# Patient Record
Sex: FEMALE | Race: WHITE | NOT HISPANIC OR LATINO | Employment: UNEMPLOYED | URBAN - NONMETROPOLITAN AREA
[De-identification: names, ages, dates, MRNs, and addresses within clinical notes are randomized per-mention and may not be internally consistent; named-entity substitution may affect disease eponyms.]

---

## 2017-01-03 ENCOUNTER — CONSULT (OUTPATIENT)
Dept: GASTROENTEROLOGY | Facility: CLINIC | Age: 52
End: 2017-01-03

## 2017-01-03 VITALS
BODY MASS INDEX: 26.52 KG/M2 | HEIGHT: 66 IN | OXYGEN SATURATION: 98 % | HEART RATE: 80 BPM | DIASTOLIC BLOOD PRESSURE: 88 MMHG | WEIGHT: 165 LBS | SYSTOLIC BLOOD PRESSURE: 122 MMHG

## 2017-01-03 DIAGNOSIS — Z80.0 FAMILY HISTORY OF COLON CANCER: ICD-10-CM

## 2017-01-03 DIAGNOSIS — R12 HEARTBURN: ICD-10-CM

## 2017-01-03 DIAGNOSIS — R19.7 DIARRHEA, UNSPECIFIED TYPE: ICD-10-CM

## 2017-01-03 DIAGNOSIS — R07.89 CHEST PAIN, ATYPICAL: ICD-10-CM

## 2017-01-03 DIAGNOSIS — R11.0 NAUSEA: ICD-10-CM

## 2017-01-03 DIAGNOSIS — K59.00 CONSTIPATION, UNSPECIFIED CONSTIPATION TYPE: ICD-10-CM

## 2017-01-03 DIAGNOSIS — K44.9 HIATAL HERNIA: ICD-10-CM

## 2017-01-03 DIAGNOSIS — K92.1 MELENA: Primary | ICD-10-CM

## 2017-01-03 DIAGNOSIS — R63.4 WEIGHT LOSS, ABNORMAL: ICD-10-CM

## 2017-01-03 PROCEDURE — 99214 OFFICE O/P EST MOD 30 MIN: CPT | Performed by: PHYSICIAN ASSISTANT

## 2017-01-03 NOTE — MR AVS SNAPSHOT
Yuliana Kennedy   1/3/2017 3:30 PM   Consult    Dept Phone:  [phone]   Encounter #:  [id number]    Provider:  RADHA Pierce   Department:  [facility] GASTROENTEROLOGY                Your Full Care Plan              Today's Medication Changes          These changes are accurate as of: 1/3/17  4:41 PM.  If you have any questions, ask your nurse or doctor.               New Medication(s)Ordered:     polyethylene glycol 236 G solution   Commonly known as:  GoLYTELY   Starting at 6 pm on day prior to procedure, drink 8 ounces every 15 minutes until all gone or stools are clear. May add flavor packet.   Started by:  RADHA Pierce         Stop taking medication(s)listed here:     pantoprazole 40 MG EC tablet   Commonly known as:  PROTONIX   Stopped by:  RADHA Pierce           ranitidine 300 MG capsule   Commonly known as:  ZANTAC   Stopped by:  RADHA Pierce           raNITIdine 300 MG tablet   Commonly known as:  ZANTAC   Stopped by:  RADHA Pierce                Where to Get Your Medications      These medications were sent to [address] [address] [facility] [phone] [place] [id number] FX  [id number] [place] [address]     Phone:  [phone]     polyethylene glycol 236 G solution                  Your Updated Medication List          This list is accurate as of: 1/3/17  4:41 PM.  Always use your most recent med list.                ALPRAZolam 1 MG tablet   Commonly known as:  XANAX   Take 1 tablet by mouth 3 (three) times a day.       Azelastine-Fluticasone 137-50 MCG/ACT suspension       cephalexin 500 MG capsule   Commonly known as:  KEFLEX   Take 1 capsule by mouth 3 (Three) Times a Day.       diclofenac 1 % gel gel   Commonly known as:  VOLTAREN   Apply 4 g topically 4 (four) times a day as needed (pain).       * Loratadine 10 MG capsule   Take 1 capsule by mouth daily.       * loratadine 10 MG tablet   Commonly known as:  CLARITIN       oxyCODONE-acetaminophen  MG per tablet   Commonly known as:  PERCOCET   Take 1 tablet by mouth Every 6 (Six) Hours As Needed for moderate pain (4-6).       polyethylene glycol 236 G solution   Commonly known as:  GoLYTELY   Starting at 6 pm on day prior to procedure, drink 8 ounces every 15 minutes until all gone or stools are clear. May add flavor packet.       sertraline 100 MG tablet   Commonly known as:  ZOLOFT   Take 1 tablet by mouth daily.       * Notice:  This list has 2 medication(s) that are the same as other medications prescribed for you. Read the directions carefully, and ask your doctor or other care provider to review them with you.            We Performed the Following     Case request       You Were Diagnosed With        Codes Comments    Melena    -  Primary ICD-10-CM: K92.1  ICD-9-CM: 578.1     Nausea     ICD-10-CM: R11.0  ICD-9-CM: 787.02     Heartburn     ICD-10-CM: R12  ICD-9-CM: 787.1     Chest pain, atypical     ICD-10-CM: R07.89  ICD-9-CM: 786.59     Constipation, unspecified constipation type     ICD-10-CM: K59.00  ICD-9-CM: 564.00     Diarrhea, unspecified type     ICD-10-CM: R19.7  ICD-9-CM: 787.91     Weight loss, abnormal     ICD-10-CM: R63.4  ICD-9-CM: 783.21     Hiatal hernia     ICD-10-CM: K44.9  ICD-9-CM: 553.3     Family history of colon cancer     ICD-10-CM: Z80.0  ICD-9-CM: V16.0       Instructions     None    Patient Instructions History      Upcoming Appointments     Visit Type Date Time Department    CONSULT 1/3/2017  3:30 PM [facility]    FOLLOW UP 1/11/2017  3:15 PM [facility]    OUTSIDE FACILITY 2/1/2017  9:30 AM [facility] [place]      MyChart Signup     Our records indicate that you have declined [facility] MyChart signup. If you would like to sign up for [employer], please email [email or web address] or call [phone] to obtain an activation code.             Other Info  "from Your Visit           Your Appointments     Jan 11, 2017  3:15 PM EST   Follow Up with Abdoul Crawford MD   [facility] FAMILY MEDICINE (--)    [address]  [facility] [address] [phone]           Arrive 15 minutes prior to appointment.            Feb 01, 2017  9:30 AM EST   Outside Facility with Rush Jordan MD   [facility] GASTROENTEROLOGY (--)    [address]  [facility] [address] [phone]              Allergies     No Known Allergies      Reason for Visit     Heartburn     Weight Loss     Chest Pain     Nausea           Vital Signs     Blood Pressure Pulse Height Weight Oxygen Saturation Body Mass Index    122/88 80 66\" (167.6 cm) 165 lb (74.8 kg) 98% 26.63 kg/m2    Smoking Status                   Current Every Day Smoker           Problems and Diagnoses Noted     Chest pain, atypical    Constipation    Diarrhea    Family history of colon cancer    Heartburn    Hiatal hernia    Black stool due to blood    Nauseous    Weight loss, abnormal        "

## 2017-01-03 NOTE — PROGRESS NOTES
Chief Complaint   Patient presents with   • Heartburn   • Weight Loss   • Chest Pain   • Nausea     Yuliana Kennedy is a 51 y.o. female who presents to the office today at the request of Dr. Abdoul Crawford for Heartburn; Weight Loss; Chest Pain; and Nausea.    HPI    The patient reports that for the last 2 months she has had a 30 pound weight loss without trying, heartburn, chest pain, and nausea.  Patient has also had a steady unintentional weight loss of 30 pounds over the past 4 months.  Her PCP had put her on zantac and Protonix but quit both because she feels like it makes her symptoms worse.  Patient stays nauseated most days.  She has a hiatal hernia.  Her chest pains last for 15-20 minutes.  She went to the ED last year and was told she had non-cardiac chest pain.  The patient explains that she has always struggled with constipation until 2 months ago when she began having diarrhea.  When her heartburn gets severe, she also has vomiting.  Patient denies hematemesis and hematochezia but reports melena less than monthly.  The patient has never had an EGD or colonoscopy.   Medical history includes acute maxillary sinusitis, allergic rhinitis, edema extremities, fracture of left tibia and fibula, generalized anxiety disorder, GERD, hormone replacement therapy, pain in shoulder, ankle and foot, symptomatic, menopausal or female climacteric states, tibial plateau fracture, and tobacco abuse.  Tibia/fibula surgery and cholecystectomy.  Patient admits to smoking 1.5 PPD x 35 years.  She also admits to drinking alcohol 5-6 times per year.  Patient denies use of illicit drugs.                                                       Review of Systems   Constitutional: Positive for fatigue and unexpected weight change.   HENT: Positive for congestion and sore throat.    Eyes: Negative.    Respiratory: Positive for cough.    Cardiovascular: Positive for chest pain, palpitations and leg swelling.   Gastrointestinal:  Positive for abdominal pain, constipation, diarrhea, nausea and vomiting. Negative for abdominal distention, anal bleeding, blood in stool and rectal pain.   Endocrine: Positive for heat intolerance.   Genitourinary: Negative.    Musculoskeletal: Positive for arthralgias, back pain and myalgias.   Skin: Negative.    Allergic/Immunologic: Positive for environmental allergies.   Neurological: Positive for dizziness, light-headedness and headaches.   Hematological: Bruises/bleeds easily.   Psychiatric/Behavioral: Positive for sleep disturbance. The patient is nervous/anxious.        ACTIVE PROBLEMS:   Specialty Problems        Gastroenterology Problems    GERD (gastroesophageal reflux disease)              PAST MEDICAL HISTORY:  Past Medical History   Diagnosis Date   • Acute maxillary sinusitis    • Allergic rhinitis    • Edema extremities    • Fracture of left tibia and fibula    • Generalized anxiety disorder    • GERD (gastroesophageal reflux disease)    • Hormone replacement therapy    • Pain in joint, shoulder region    • Pain, joint, ankle and foot    • Symptomatic menopausal or female climacteric states    • Tibial plateau fracture      Left   • Tobacco abuse        SURGICAL HISTORY:  Past Surgical History   Procedure Laterality Date   • Tibia / fibia lengthening     • Cholecystectomy         FAMILY HISTORY:  Family History   Problem Relation Age of Onset   • Cancer Mother    • Heart disease Mother    • Cancer Father    • Cancer Maternal Grandfather    • Cancer Paternal Grandfather        SOCIAL HISTORY:  Social History   Substance Use Topics   • Smoking status: Current Every Day Smoker     Packs/day: 1.50     Years: 35.00     Types: Cigarettes   • Smokeless tobacco: Never Used   • Alcohol use Yes      Comment: OCCASIONLLY       CURRENT MEDICATION:    Current Outpatient Prescriptions:   •  ALPRAZolam (XANAX) 1 MG tablet, Take 1 tablet by mouth 3 (three) times a day., Disp: 90 tablet, Rfl: 5  •   "Azelastine-Fluticasone 137-50 MCG/ACT suspension, 1-2 sprays into each nostril 2 (two) times a day., Disp: , Rfl:   •  cephalexin (KEFLEX) 500 MG capsule, Take 1 capsule by mouth 3 (Three) Times a Day., Disp: 30 capsule, Rfl: 0  •  diclofenac (VOLTAREN) 1 % gel gel, Apply 4 g topically 4 (four) times a day as needed (pain)., Disp: 3 tube, Rfl: 11  •  loratadine (CLARITIN) 10 MG tablet, , Disp: , Rfl: 11  •  Loratadine 10 MG capsule, Take 1 capsule by mouth daily., Disp: 30 each, Rfl: 11  •  oxyCODONE-acetaminophen (PERCOCET)  MG per tablet, Take 1 tablet by mouth Every 6 (Six) Hours As Needed for moderate pain (4-6)., Disp: 120 tablet, Rfl: 0  •  polyethylene glycol (GoLYTELY) 236 G solution, Starting at 6 pm on day prior to procedure, drink 8 ounces every 15 minutes until all gone or stools are clear. May add flavor packet., Disp: 4000 mL, Rfl: 0  •  sertraline (ZOLOFT) 100 MG tablet, Take 1 tablet by mouth daily., Disp: 30 tablet, Rfl: 11    ALLERGIES:  Review of patient's allergies indicates no known allergies.    VISIT VITALS:  Visit Vitals   • /88   • Pulse 80   • Ht 66\" (167.6 cm)   • Wt 165 lb (74.8 kg)   • SpO2 98%   • BMI 26.63 kg/m2       PHYSICAL EXAMINATION:  Physical Exam   Constitutional: She is oriented to person, place, and time. She appears well-developed and well-nourished. No distress.   HENT:   Head: Normocephalic and atraumatic.   Right Ear: External ear normal.   Left Ear: External ear normal.   Nose: Nose normal.   Mouth/Throat: Oropharynx is clear and moist. No oropharyngeal exudate.   Eyes: Conjunctivae and EOM are normal. Pupils are equal, round, and reactive to light. Right eye exhibits no discharge. Left eye exhibits no discharge. No scleral icterus.   Neck: Normal range of motion. Neck supple. No JVD present. No tracheal deviation present. No thyromegaly present.   Cardiovascular: Normal rate, regular rhythm, normal heart sounds and intact distal pulses.  Exam reveals no " gallop and no friction rub.    No murmur heard.  Pulmonary/Chest: Effort normal and breath sounds normal. No stridor. No respiratory distress. She has no wheezes. She has no rales. She exhibits no tenderness.   Abdominal: Soft. Bowel sounds are normal. She exhibits no distension and no mass. There is no tenderness. There is no rebound and no guarding. No hernia.   Musculoskeletal: She exhibits no edema, tenderness or deformity.   Lymphadenopathy:     She has no cervical adenopathy.   Neurological: She is alert and oriented to person, place, and time. She has normal reflexes. She displays normal reflexes. No cranial nerve deficit. She exhibits normal muscle tone. Coordination normal.   Skin: Skin is warm and dry. No rash noted. She is not diaphoretic. No erythema. No pallor.   Psychiatric: She has a normal mood and affect. Her behavior is normal. Judgment and thought content normal.       Assessment/Plan      Diagnosis Plan   1. Melena  Case request   2. Nausea  Case request   3. Heartburn  Case request   4. Chest pain, atypical  Case request   5. Constipation, unspecified constipation type  Case request   6. Diarrhea, unspecified type  Case request   7. Weight loss, abnormal  Case request   8. Hiatal hernia  Case request   9. Family history of colon cancer  Case request     The patient is having multiple GI symptoms.  She also has a strong family history of colon cancer.  It is recommended that the patient have an EGD/colonoscopy.  Procedures were explained to the patient who voiced understanding and agreement of recommendations.  Return in about 4 weeks (around 1/31/2017) for Recheck.           RADHA Pierce

## 2017-01-11 ENCOUNTER — OFFICE VISIT (OUTPATIENT)
Dept: FAMILY MEDICINE CLINIC | Facility: CLINIC | Age: 52
End: 2017-01-11

## 2017-01-11 VITALS
SYSTOLIC BLOOD PRESSURE: 122 MMHG | DIASTOLIC BLOOD PRESSURE: 80 MMHG | WEIGHT: 163 LBS | OXYGEN SATURATION: 96 % | TEMPERATURE: 97.6 F | BODY MASS INDEX: 26.2 KG/M2 | HEIGHT: 66 IN | HEART RATE: 78 BPM

## 2017-01-11 DIAGNOSIS — J40 BRONCHITIS: ICD-10-CM

## 2017-01-11 DIAGNOSIS — S82.142D TIBIAL PLATEAU FRACTURE, LEFT, CLOSED, WITH ROUTINE HEALING, SUBSEQUENT ENCOUNTER: ICD-10-CM

## 2017-01-11 DIAGNOSIS — G89.29 CHRONIC PAIN OF LEFT KNEE: Primary | ICD-10-CM

## 2017-01-11 DIAGNOSIS — M25.562 CHRONIC PAIN OF LEFT KNEE: Primary | ICD-10-CM

## 2017-01-11 PROCEDURE — 99213 OFFICE O/P EST LOW 20 MIN: CPT | Performed by: FAMILY MEDICINE

## 2017-01-11 RX ORDER — OXYCODONE AND ACETAMINOPHEN 10; 325 MG/1; MG/1
1 TABLET ORAL EVERY 6 HOURS PRN
Qty: 105 TABLET | Refills: 0 | Status: SHIPPED | OUTPATIENT
Start: 2017-01-11 | End: 2017-02-08 | Stop reason: SDUPTHER

## 2017-01-11 NOTE — MR AVS SNAPSHOT
Yuliana Kennedy   1/11/2017 3:15 PM   Office Visit    Dept Phone:  [phone]   Encounter #:  [id number]    Provider:  Abdoul Crawford MD   Department:  [facility] FAMILY MEDICINE                Your Full Care Plan              Today's Medication Changes          These changes are accurate as of: 1/11/17  3:16 PM.  If you have any questions, ask your nurse or doctor.               Medication(s)that have changed:     Loratadine 10 MG capsule   Take 1 capsule by mouth daily.   What changed:  Another medication with the same name was removed. Continue taking this medication, and follow the directions you see here.   Changed by:  Abdoul Crawford MD         Stop taking medication(s)listed here:     cephalexin 500 MG capsule   Commonly known as:  KEFLEX   Stopped by:  Abdoul Crawford MD                Where to Get Your Medications      You can get these medications from any pharmacy     Bring a paper prescription for each of these medications     oxyCODONE-acetaminophen  MG per tablet                  Your Updated Medication List          This list is accurate as of: 1/11/17  3:16 PM.  Always use your most recent med list.                ALPRAZolam 1 MG tablet   Commonly known as:  XANAX   Take 1 tablet by mouth 3 (three) times a day.       Azelastine-Fluticasone 137-50 MCG/ACT suspension       diclofenac 1 % gel gel   Commonly known as:  VOLTAREN   Apply 4 g topically 4 (four) times a day as needed (pain).       Loratadine 10 MG capsule   Take 1 capsule by mouth daily.       oxyCODONE-acetaminophen  MG per tablet   Commonly known as:  PERCOCET   Take 1 tablet by mouth Every 6 (Six) Hours As Needed for moderate pain (4-6).       polyethylene glycol 236 G solution   Commonly known as:  GoLYTELY   Starting at 6 pm on day prior to procedure, drink 8 ounces every 15 minutes until all gone or stools are clear. May add flavor packet.       sertraline 100 MG tablet   Commonly known as:  ZOLOFT   Take 1 tablet by mouth daily.               Instructions     None    Patient Instructions History      Upcoming Appointments     Visit Type Date Time Department    FOLLOW UP 1/11/2017  3:15 PM [facility]    OUTSIDE FACILITY 1/18/2017 11:00 AM [facility]    OUTSIDE FACILITY 2/1/2017  9:30 AM [facility] [place]    OFFICE VISIT 2/9/2017  3:15 PM [facility]    PHYSICAL 3/6/2017  1:30 PM [facility]      MyChart Signup     Our records indicate that you have declined [facility] MyChart signup. If you would like to sign up for MyChart, please email [email or web address] or call [phone] to obtain an activation code.             Other Info from Your Visit           Your Appointments     Jan 18, 2017 11:00 AM EST   Outside Facility with Pb Pritchard III, MD   [facility] GASTROENTEROLOGY (--)    [address]  [place] [address]   [phone]            Feb 01, 2017  9:30 AM EST   Outside Facility with Rush Jordan MD   [facility] GASTROENTEROLOGY (--)    [address]  [facility] [address]   [phone]            Feb 09, 2017  3:15 PM EST   Office Visit with Abdoul Crawford MD   [facility] FAMILY MEDICINE (--)    [address]  [facility] [address] [phone]           Arrive 15 minutes prior to appointment.            Mar 06, 2017  1:30 PM EST   Physical with Abdoul Crawford MD   [facility] FAMILY MEDICINE (--)    [address]  [facility] [address]   [phone]           Arrive 15 minutes prior to appointment.              Allergies     No Known Allergies      Reason for Visit     Knee Pain Pt needs refills on percocet    Earache Dizziness, lightheaded    Wheezing coughing, productive, SOA      Vital Signs     Blood Pressure Pulse  "Temperature Height Weight Oxygen Saturation    122/80 78 97.6 °F (36.4 °C) 66\" (167.6 cm) 163 lb (73.9 kg) 96%    Body Mass Index Smoking Status                26.31 kg/m2 Current Every Day Smoker            "

## 2017-01-15 NOTE — PROGRESS NOTES
Subjective   Yuliana Kennedy is a 51 y.o. female    HPI Comments: Here to follow up persisting left knee pain that started as a traumatic fracture. She now sees Dr Mackey in Orthopedics. She has become dependent on Percocet due to her pain. Her Claudio is reviewed and is appropriate.    Also complains of ear pain and pressure, productive cough, dizziness and SOA. She is a chronic smoker.    Knee Pain      Earache    Associated symptoms include coughing. Pertinent negatives include no sore throat.   Wheezing    Associated symptoms include coughing and ear pain. Pertinent negatives include no chest pain, chills, fever, shortness of breath or sore throat.       The following portions of the patient's history were reviewed and updated as appropriate: allergies, current medications, past social history and problem list    Review of Systems   Constitutional: Negative for chills, fatigue and fever.   HENT: Positive for ear pain. Negative for sinus pressure and sore throat.    Eyes: Negative.    Respiratory: Positive for cough and wheezing. Negative for chest tightness and shortness of breath.    Cardiovascular: Negative for chest pain.   Gastrointestinal: Negative for nausea.   Musculoskeletal: Positive for arthralgias, back pain and gait problem.   Hematological: Negative.    Psychiatric/Behavioral: Negative.        Objective     Vitals:    01/11/17 1454   BP: 122/80   Pulse: 78   Temp: 97.6 °F (36.4 °C)   SpO2: 96%       Physical Exam   Constitutional: She is oriented to person, place, and time. She appears well-developed and well-nourished.   HENT:   Head: Normocephalic and atraumatic.   Right Ear: Tympanic membrane and ear canal normal. Tympanic membrane is not injected.   Left Ear: Tympanic membrane and ear canal normal. Tympanic membrane is not injected.   Nose: Nose normal.   Mouth/Throat: Oropharynx is clear and moist.   Neck: Neck supple. No JVD present.   Cardiovascular: Normal rate, regular rhythm and normal  heart sounds.  Exam reveals no gallop and no friction rub.    No murmur heard.  Pulmonary/Chest: Effort normal. No stridor. No respiratory distress. She has decreased breath sounds. She has wheezes.   Musculoskeletal: She exhibits tenderness and deformity. She exhibits no edema.        Left knee: She exhibits decreased range of motion and deformity. Tenderness found.   Lymphadenopathy:     She has no cervical adenopathy.   Neurological: She is alert and oriented to person, place, and time.   Nursing note and vitals reviewed.      Assessment/Plan   Problem List Items Addressed This Visit        Musculoskeletal and Integument    Tibial plateau fracture      Other Visit Diagnoses     Chronic pain of left knee    -  Primary    Relevant Medications    oxyCODONE-acetaminophen (PERCOCET)  MG per tablet    Bronchitis            Have decreased her oxycodone by 1/2 tablet per day.

## 2017-01-18 ENCOUNTER — ANESTHESIA EVENT (OUTPATIENT)
Dept: PERIOP | Facility: HOSPITAL | Age: 52
End: 2017-01-18

## 2017-01-18 ENCOUNTER — ANESTHESIA (OUTPATIENT)
Dept: PERIOP | Facility: HOSPITAL | Age: 52
End: 2017-01-18

## 2017-01-18 ENCOUNTER — HOSPITAL ENCOUNTER (OUTPATIENT)
Facility: HOSPITAL | Age: 52
Setting detail: HOSPITAL OUTPATIENT SURGERY
Discharge: HOME | End: 2017-01-18
Attending: INTERNAL MEDICINE | Admitting: INTERNAL MEDICINE

## 2017-01-18 VITALS
SYSTOLIC BLOOD PRESSURE: 123 MMHG | WEIGHT: 163 LBS | RESPIRATION RATE: 20 BRPM | HEART RATE: 61 BPM | TEMPERATURE: 97.3 F | DIASTOLIC BLOOD PRESSURE: 79 MMHG | OXYGEN SATURATION: 97 % | BODY MASS INDEX: 26.2 KG/M2 | HEIGHT: 66 IN

## 2017-01-18 DIAGNOSIS — R07.89 CHEST PAIN, ATYPICAL: ICD-10-CM

## 2017-01-18 DIAGNOSIS — R63.4 WEIGHT LOSS, ABNORMAL: ICD-10-CM

## 2017-01-18 DIAGNOSIS — K44.9 HIATAL HERNIA: ICD-10-CM

## 2017-01-18 DIAGNOSIS — Z80.0 FAMILY HISTORY OF COLON CANCER: ICD-10-CM

## 2017-01-18 DIAGNOSIS — K59.00 CONSTIPATION, UNSPECIFIED CONSTIPATION TYPE: ICD-10-CM

## 2017-01-18 DIAGNOSIS — R12 HEARTBURN: ICD-10-CM

## 2017-01-18 DIAGNOSIS — R11.0 NAUSEA: ICD-10-CM

## 2017-01-18 DIAGNOSIS — K92.1 MELENA: ICD-10-CM

## 2017-01-18 DIAGNOSIS — R19.7 DIARRHEA, UNSPECIFIED TYPE: ICD-10-CM

## 2017-01-18 PROCEDURE — 25010000002 PROPOFOL 1000 MG/ML EMULSION: Performed by: NURSE ANESTHETIST, CERTIFIED REGISTERED

## 2017-01-18 PROCEDURE — 45378 DIAGNOSTIC COLONOSCOPY: CPT | Performed by: INTERNAL MEDICINE

## 2017-01-18 PROCEDURE — 43239 EGD BIOPSY SINGLE/MULTIPLE: CPT | Performed by: INTERNAL MEDICINE

## 2017-01-18 PROCEDURE — 25010000002 PROPOFOL 10 MG/ML EMULSION: Performed by: NURSE ANESTHETIST, CERTIFIED REGISTERED

## 2017-01-18 RX ORDER — RANITIDINE 300 MG/1
TABLET ORAL
Refills: 11 | COMMUNITY
Start: 2017-01-11 | End: 2017-05-01

## 2017-01-18 RX ORDER — FENTANYL CITRATE 50 UG/ML
50 INJECTION, SOLUTION INTRAMUSCULAR; INTRAVENOUS
Status: DISCONTINUED | OUTPATIENT
Start: 2017-01-18 | End: 2017-04-05

## 2017-01-18 RX ORDER — OXYCODONE HYDROCHLORIDE AND ACETAMINOPHEN 5; 325 MG/1; MG/1
1 TABLET ORAL ONCE AS NEEDED
Status: ACTIVE | OUTPATIENT
Start: 2017-01-18 | End: 2017-01-28

## 2017-01-18 RX ORDER — SODIUM CHLORIDE, SODIUM LACTATE, POTASSIUM CHLORIDE, CALCIUM CHLORIDE 600; 310; 30; 20 MG/100ML; MG/100ML; MG/100ML; MG/100ML
125 INJECTION, SOLUTION INTRAVENOUS CONTINUOUS
Status: DISCONTINUED | OUTPATIENT
Start: 2017-01-18 | End: 2017-01-18 | Stop reason: HOSPADM

## 2017-01-18 RX ORDER — IPRATROPIUM BROMIDE AND ALBUTEROL SULFATE 2.5; .5 MG/3ML; MG/3ML
3 SOLUTION RESPIRATORY (INHALATION) ONCE AS NEEDED
Status: DISCONTINUED | OUTPATIENT
Start: 2017-01-18 | End: 2017-04-05

## 2017-01-18 RX ORDER — PANTOPRAZOLE SODIUM 40 MG/1
TABLET, DELAYED RELEASE ORAL
Refills: 11 | COMMUNITY
Start: 2017-01-11 | End: 2018-03-29

## 2017-01-18 RX ORDER — PROPOFOL 10 MG/ML
VIAL (ML) INTRAVENOUS AS NEEDED
Status: DISCONTINUED | OUTPATIENT
Start: 2017-01-18 | End: 2017-01-18 | Stop reason: SURG

## 2017-01-18 RX ORDER — MEPERIDINE HYDROCHLORIDE 25 MG/ML
12.5 INJECTION INTRAMUSCULAR; INTRAVENOUS; SUBCUTANEOUS
Status: ACTIVE | OUTPATIENT
Start: 2017-01-18 | End: 2017-01-19

## 2017-01-18 RX ORDER — ONDANSETRON 2 MG/ML
4 INJECTION INTRAMUSCULAR; INTRAVENOUS ONCE AS NEEDED
Status: DISCONTINUED | OUTPATIENT
Start: 2017-01-18 | End: 2017-04-05

## 2017-01-18 RX ORDER — SODIUM CHLORIDE 0.9 % (FLUSH) 0.9 %
1-10 SYRINGE (ML) INJECTION AS NEEDED
Status: DISCONTINUED | OUTPATIENT
Start: 2017-01-18 | End: 2017-01-18 | Stop reason: HOSPADM

## 2017-01-18 RX ADMIN — PROPOFOL 50 MG: 10 INJECTION, EMULSION INTRAVENOUS at 09:09

## 2017-01-18 RX ADMIN — SODIUM CHLORIDE, POTASSIUM CHLORIDE, SODIUM LACTATE AND CALCIUM CHLORIDE: 600; 310; 30; 20 INJECTION, SOLUTION INTRAVENOUS at 09:06

## 2017-01-18 RX ADMIN — PROPOFOL 180 MCG/KG/MIN: 10 INJECTION, EMULSION INTRAVENOUS at 09:09

## 2017-01-18 NOTE — ANESTHESIA PREPROCEDURE EVALUATION
Anesthesia Evaluation     Patient summary reviewed and Nursing notes reviewed    Airway   Mallampati: I  TM distance: <3 FB  Neck ROM: full  no difficulty expected  Dental - normal exam     Pulmonary - negative pulmonary ROS and normal exam   Cardiovascular - negative cardio ROS and normal exam    Neuro/Psych  (+) psychiatric history,    GI/Hepatic/Renal/Endo    (+)  hiatal hernia, GERD,     Musculoskeletal (-) negative ROS    Abdominal  - normal exam    Bowel sounds: normal.   Substance History - negative use     OB/GYN negative ob/gyn ROS         Other                             Anesthesia Plan    ASA 2     general     intravenous induction   Anesthetic plan and risks discussed with patient.  Use of blood products discussed with patient .   Plan discussed with CRNA.

## 2017-01-18 NOTE — OP NOTE
01/18/17    ESOPHAGOGASTRODUODENOSCOPY WITH BIOPSY, COLONOSCOPY  Procedure Note    Yuliana Kennedy  1/18/2017    Pre-op Diagnosis: Recurrent nausea, GERD symptoms, chest pain, weight loss, family history of colon cancer, colon cancer screening, no prior EGD or colonoscopy      Post-op Diagnosis:   -Small hiatal hernia  -Normal colonoscopy        Anesthesia: Per Anesthesia service      Estimated Blood Loss: Negligible      Findings: EGD was performed with careful inspection of the esophagus, stomach and duodenum to the fourth portion.  A very small hiatal hernia was present without obvious active esophagitis.  The examination was otherwise normal.  Biopsies were taken randomly from the gastric antrum and from the duodenum.    Normal rectal examination.  Colonoscopy was performed to the cecum, confirmed by identification of typical cecal anatomy.  Bowel preparation was satisfactory.  The scope was retroflexed within the rectum.  All areas were examined carefully and no abnormality was seen.      Complications: None      Recommendations:   Findings discussed with the patient  Await biopsy findings  Repeat screening/surveillance colonoscopy in about 5 years      Pb Pritchard III, MD     Date: 1/18/2017  Time: 9:31 AM     CC:  Dr. Abdoul Crawford

## 2017-01-18 NOTE — ANESTHESIA POSTPROCEDURE EVALUATION
Patient: Yuliana Kennedy    Procedure Summary     Date Anesthesia Start Anesthesia Stop Room / Location    01/18/17 0906 0931  [facility] OR 10 / [facility] OR       Procedure Diagnosis Surgeon Provider    ESOPHAGOGASTRODUODENOSCOPY WITH BIOPSY  CPTCODE:83495 (N/A Esophagus); COLONOSCOPY  CPTCODE:07766 (N/A ) Heartburn; Melena; Hiatal hernia; Nausea; Family history of colon cancer; Weight loss, abnormal; Chest pain, atypical; Constipation, unspecified constipation type; Diarrhea, unspecified type  (Heartburn [R12]; Melena [K92.1]; Hiatal hernia [K44.9]; Nausea [R11.0]; Family history of colon cancer [Z80.0]; Weight loss, abnormal [R63.4]; Chest pain, atypical [R07.89]; Constipation, unspecified constipation type [K59.00]; Diarrhea, unspecified type [R19.7]) MD Raheel Basilio III, MD          Anesthesia Type: general  Last vitals  /79 (01/18/17 1003)    Temp 97.3 °F (36.3 °C) (01/18/17 0933)    Pulse 61 (01/18/17 1003)   Resp 20 (01/18/17 1003)    SpO2 97 % (01/18/17 1003)      Post Anesthesia Care and Evaluation    Patient location during evaluation: PHASE II  Patient participation: complete - patient participated  Level of consciousness: awake and alert  Pain score: 1  Pain management: adequate  Airway patency: patent  Anesthetic complications: No anesthetic complications  PONV Status: controlled  Cardiovascular status: acceptable  Respiratory status: acceptable  Hydration status: acceptable

## 2017-01-18 NOTE — IP AVS SNAPSHOT
AFTER VISIT SUMMARY             Yuliana Kennedy           About your hospitalization     You were admitted on:  January 18, 2017 You last received care in the:  [facility] OPERATING ROOM DEPARTMENT       Procedures & Surgeries      Procedure(s) (LRB):  ESOPHAGOGASTRODUODENOSCOPY WITH BIOPSY  CPTCODE:21594 (N/A)  COLONOSCOPY  CPTCODE:11667 (N/A)     1/18/2017     Surgeon(s):  Pb Pritchard III, MD  -------------------      Medications    If you or your caregiver advised us that you are currently taking a medication and that medication is marked below as “Resume”, this simply indicates that we have reviewed those medications to make sure our new therapy recommendations do not interfere.  If you have concerns about medications other than those new ones which we are prescribing today, please consult the physician who prescribed them (or your primary physician).  Our review of your home medications is not meant to indicate that we are directing their use.             Your Medications      CONTINUE taking these medications     ALPRAZolam 1 MG tablet   Take 1 tablet by mouth 3 (three) times a day.   Commonly known as:  XANAX           Azelastine-Fluticasone 137-50 MCG/ACT suspension   1-2 sprays into each nostril 2 (two) times a day.           diclofenac 1 % gel gel   Apply 4 g topically 4 (four) times a day as needed (pain).   Commonly known as:  VOLTAREN           Loratadine 10 MG capsule   Take 1 capsule by mouth daily.           oxyCODONE-acetaminophen  MG per tablet   Take 1 tablet by mouth Every 6 (Six) Hours As Needed for moderate pain (4-6).   Commonly known as:  PERCOCET           pantoprazole 40 MG EC tablet   Commonly known as:  PROTONIX           polyethylene glycol 236 G solution   Starting at 6 pm on day prior to procedure, drink 8 ounces every 15 minutes until all gone or stools are clear. May add flavor packet.   Commonly known as:  GoLYTELY           raNITIdine 300 MG tablet      Commonly known as:  ZANTAC           sertraline 100 MG tablet   Take 1 tablet by mouth daily.   Commonly known as:  ZOLOFT                      Your Medications      Your Medication List           Morning Noon Evening Bedtime As Needed    ALPRAZolam 1 MG tablet   Take 1 tablet by mouth 3 (three) times a day.   Commonly known as:  XANAX                                Azelastine-Fluticasone 137-50 MCG/ACT suspension   1-2 sprays into each nostril 2 (two) times a day.                                diclofenac 1 % gel gel   Apply 4 g topically 4 (four) times a day as needed (pain).   Commonly known as:  VOLTAREN                                Loratadine 10 MG capsule   Take 1 capsule by mouth daily.                                oxyCODONE-acetaminophen  MG per tablet   Take 1 tablet by mouth Every 6 (Six) Hours As Needed for moderate pain (4-6).   Commonly known as:  PERCOCET                                pantoprazole 40 MG EC tablet   Commonly known as:  PROTONIX                                polyethylene glycol 236 G solution   Starting at 6 pm on day prior to procedure, drink 8 ounces every 15 minutes until all gone or stools are clear. May add flavor packet.   Commonly known as:  GoLYTELY                                raNITIdine 300 MG tablet   Commonly known as:  ZANTAC                                sertraline 100 MG tablet   Take 1 tablet by mouth daily.   Commonly known as:  ZOLOFT                                         Instructions for After Discharge        Discharge References/Attachments     GENERAL ANESTHESIA, ADULT, CARE AFTER (ENGLISH)    ESOPHAGOGASTRODUODENOSCOPY, CARE AFTER (ENGLISH)    COLONOSCOPY, CARE AFTER (ENGLISH)    HIATAL HERNIA (ENGLISH)       Follow-ups for After Discharge        Follow-up Information     Follow up with Abdoul Crawford MD .    Specialty:  Family Medicine    Contact information:    [facility] [id number] [phone]         Referrals and Follow-ups to Schedule     Discharge Follow-up    As directed    Follow Up Details:  As Needed             Scheduled Appointments     Jan 18, 2017 11:00 AM EST   Outside Facility with Pb Pritchard III, MD   [facility] GASTROENTEROLOGY (--)    [address]  [facility] [address]   [phone]            Feb 01, 2017  9:30 AM EST   Outside Facility with Rush Jordan MD   [facility] GASTROENTEROLOGY (--)    [address]  [facility] [address]   [phone]            Feb 09, 2017  3:15 PM EST   Office Visit with Abdoul Crawford MD   [facility] FAMILY MEDICINE (--)    [address]  [facility] [address]   [phone]           Arrive 15 minutes prior to appointment.            Mar 06, 2017  1:30 PM EST   Physical with Abdoul Crawford MD   [facility] FAMILY [facility]INE (--)    [address]  [facility] [address]   [phone]           Arrive 15 minutes prior to appointment.              MyChart Signup     Our records indicate that you have declined [facility] signup. If you would like to sign up for Kai[employer], please email [email or web address] or call [phone] to obtain an activation code.         Summary of Your Hospitalization        Reason for Hospitalization     Your primary diagnosis was:  Not on File      Care Providers     Provider Service Role Specialty    Pb Pritchard III, MD -- Attending Provider Gastroenterology    Pb Pritchard III, MD -- Surgeon  Gastroenterology      Your Allergies  Date Reviewed: 1/18/2017    No active allergies      Pending Labs     Order Current Status    Tissue Exam Collected (01/18/17 0913)      Patient Belongings Returned     Document Return of Belongings Flowsheet     Were the patient bedside belongings sent home?   --   Belongings Retrieved from Security &  Sent Home   --    Belongings Sent to Safe   --   Medications Retrieved from Pharmacy & Sent Home   --              More Information      General Anesthesia, Adult, Care After  Refer to this sheet in the next few weeks. These instructions provide you with information on caring for yourself after your procedure. Your health care provider may also give you more specific instructions. Your treatment has been planned according to current medical practices, but problems sometimes occur. Call your health care provider if you have any problems or questions after your procedure.  WHAT TO EXPECT AFTER THE PROCEDURE  After the procedure, it is typical to experience:  · Sleepiness.  · Nausea and vomiting.  HOME CARE INSTRUCTIONS  · For the first 24 hours after general anesthesia:  ¨ Have a responsible person with you.  ¨ Do not drive a car. If you are alone, do not take public transportation.  ¨ Do not drink alcohol.  ¨ Do not take medicine that has not been prescribed by your health care provider.  ¨ Do not sign important papers or make important decisions.  ¨ You may resume a normal diet and activities as directed by your health care provider.  · Change bandages (dressings) as directed.  · If you have questions or problems that seem related to general anesthesia, call the hospital and ask for the anesthetist or anesthesiologist on call.  SEEK MEDICAL CARE IF:  · You have nausea and vomiting that continue the day after anesthesia.  · You develop a rash.  SEEK IMMEDIATE MEDICAL CARE IF:   · You have difficulty breathing.  · You have chest pain.  · You have any allergic problems.     This information is not intended to replace advice given to you by your health care provider. Make sure you discuss any questions you have with your health care provider.     Document Released: 03/26/2002 Document Revised: 01/08/2016 Document Reviewed: 04/17/2013  Elsevier Interactive Patient Education ©2016 Elsevier  Inc.          Esophagogastroduodenoscopy, Care After  Refer to this sheet in the next few weeks. These instructions provide you with information about caring for yourself after your procedure. Your health care provider may also give you more specific instructions. Your treatment has been planned according to current medical practices, but problems sometimes occur. Call your health care provider if you have any problems or questions after your procedure.  WHAT TO EXPECT AFTER THE PROCEDURE  After your procedure, it is typical to feel:  · Soreness in your throat.  · Pain with swallowing.  · Sick to your stomach (nauseous).  · Bloated.  · Dizzy.  · Fatigued.  HOME CARE INSTRUCTIONS  · Do not eat or drink anything until the numbing medicine (local anesthetic) has worn off and your gag reflex has returned. You will know that the local anesthetic has worn off when you can swallow comfortably.  · Do not drive or operate machinery until directed by your health care provider.  · Take medicines only as directed by your health care provider.  SEEK MEDICAL CARE IF:   · You cannot stop coughing.  · You are not urinating at all or less than usual.  SEEK IMMEDIATE MEDICAL CARE IF:  · You have difficulty swallowing.  · You cannot eat or drink.  · You have worsening throat or chest pain.  · You have dizziness or lightheadedness or you faint.  · You have nausea or vomiting.  · You have chills.  · You have a fever.  · You have severe abdominal pain.  · You have black, tarry, or bloody stools.     This information is not intended to replace advice given to you by your health care provider. Make sure you discuss any questions you have with your health care provider.     Document Released: 12/04/2013 Document Revised: 01/08/2016 Document Reviewed: 12/04/2013  [employer] Interactive Patient Education ©2016 Elsevier Inc.          Colonoscopy, Care After  Refer to this sheet in the next few weeks. These instructions provide you with  information on caring for yourself after your procedure. Your health care provider may also give you more specific instructions. Your treatment has been planned according to current medical practices, but problems sometimes occur. Call your health care provider if you have any problems or questions after your procedure.  WHAT TO EXPECT AFTER THE PROCEDURE   After your procedure, it is typical to have the following:  · A small amount of blood in your stool.  · Moderate amounts of gas and mild abdominal cramping or bloating.  HOME CARE INSTRUCTIONS  · Do not drive, operate machinery, or sign important documents for 24 hours.  · You may shower and resume your regular physical activities, but move at a slower pace for the first 24 hours.  · Take frequent rest periods for the first 24 hours.  · Walk around or put a warm pack on your abdomen to help reduce abdominal cramping and bloating.  · Drink enough fluids to keep your urine clear or pale yellow.  · You may resume your normal diet as instructed by your health care provider. Avoid heavy or fried foods that are hard to digest.  · Avoid drinking alcohol for 24 hours or as instructed by your health care provider.  · Only take over-the-counter or prescription medicines as directed by your health care provider.  · If a tissue sample (biopsy) was taken during your procedure:    Do not take aspirin or blood thinners for 7 days, or as instructed by your health care provider.    Do not drink alcohol for 7 days, or as instructed by your health care provider.    Eat soft foods for the first 24 hours.  SEEK MEDICAL CARE IF:  You have persistent spotting of blood in your stool 2-3 days after the procedure.  SEEK IMMEDIATE MEDICAL CARE IF:  · You have more than a small spotting of blood in your stool.  · You pass large blood clots in your stool.  · Your abdomen is swollen (distended).  · You have nausea or vomiting.  · You have a fever.  · You have increasing abdominal pain that is  not relieved with medicine.     This information is not intended to replace advice given to you by your health care provider. Make sure you discuss any questions you have with your health care provider.     Document Released: 08/01/2005 Document Revised: 10/08/2014 Document Reviewed: 08/25/2014  [employer] Interactive Patient Education ©2016 [employer]          Hiatal Hernia  A hiatal hernia occurs when part of your stomach slides above the muscle that separates your abdomen from your chest (diaphragm). You can be born with a hiatal hernia (congenital), or it may develop over time. In almost all cases of hiatal hernia, only the top part of the stomach pushes through.   Many people have a hiatal hernia with no symptoms. The larger the hernia, the more likely that you will have symptoms. In some cases, a hiatal hernia allows stomach acid to flow back into the tube that carries food from your mouth to your stomach (esophagus). This may cause heartburn symptoms. Severe heartburn symptoms may mean you have developed a condition called gastroesophageal reflux disease (GERD).   CAUSES   Hiatal hernias are caused by a weakness in the opening (hiatus) where your esophagus passes through your diaphragm to attach to the upper part of your stomach. You may be born with a weakness in your hiatus, or a weakness can develop.  RISK FACTORS  Older age is a major risk factor for a hiatal hernia. Anything that increases pressure on your diaphragm can also increase your risk of a hiatal hernia. This includes:  · Pregnancy.  · Excess weight.  · Frequent constipation.  SIGNS AND SYMPTOMS   People with a hiatal hernia often have no symptoms. If symptoms develop, they are almost always caused by GERD. They may include:  · Heartburn.  · Belching.  · Indigestion.  · Trouble swallowing.  · Coughing or wheezing.   · Sore throat.  · Hoarseness.  · Chest pain.  DIAGNOSIS   A hiatal hernia is sometimes found during an exam for another problem.  Your health care provider may suspect a hiatal hernia if you have symptoms of GERD. Tests may be done to diagnose GERD. These may include:  · X-rays of your stomach or chest.  · An upper gastrointestinal (GI) series. This is an X-ray exam of your GI tract involving the use of a chalky liquid that you swallow. The liquid shows up clearly on the X-ray.  · Endoscopy. This is a procedure to look into your stomach using a thin, flexible tube that has a tiny camera and light on the end of it.  TREATMENT   If you have no symptoms, you may not need treatment. If you have symptoms, treatment may include:  · Dietary and lifestyle changes to help reduce GERD symptoms.  · Medicines. These may include:    Over-the-counter antacids.    Medicines that make your stomach empty more quickly.    Medicines that block the production of stomach acid (H2 blockers).    Stronger medicines to reduce stomach acid (proton pump inhibitors).  · You may need surgery to repair the hernia if other treatments are not helping.  HOME CARE INSTRUCTIONS   · Take all medicines as directed by your health care provider.  · Quit smoking, if you smoke.  · Try to achieve and maintain a healthy body weight.  · Eat frequent small meals instead of three large meals a day. This keeps your stomach from getting too full.    Eat slowly.    Do not lie down right after eating.     Do not eat 1-2 hours before bed.    · Do not drink beverages with caffeine. These include cola, coffee, cocoa, and tea.  · Do not drink alcohol.  · Avoid foods that can make symptoms of GERD worse. These may include:    Fatty foods.    Citrus fruits.    Other foods and drinks that contain acid.  · Avoid putting pressure on your belly. Anything that puts pressure on your belly increases the amount of acid that may be pushed up into your esophagus.      Avoid bending over, especially after eating.    Raise the head of your bed by putting blocks under the legs. This keeps your head and  esophagus higher than your stomach.    Do not wear tight clothing around your chest or stomach.    Try not to strain when having a bowel movement, when urinating, or when lifting heavy objects.  SEEK MEDICAL CARE IF:  · Your symptoms are not controlled with medicines or lifestyle changes.  · You are having trouble swallowing.  · You have coughing or wheezing that will not go away.  SEEK IMMEDIATE MEDICAL CARE IF:  · Your pain is getting worse.  · Your pain spreads to your arms, neck, jaw, teeth, or back.  · You have shortness of breath.  · You sweat for no reason.  · You feel sick to your stomach (nauseous) or vomit.  · You vomit blood.  · You have bright red blood in your stools.  · You have black, tarry stools.       This information is not intended to replace advice given to you by your health care provider. Make sure you discuss any questions you have with your health care provider.     Document Released: 03/09/2005 Document Revised: 01/08/2016 Document Reviewed: 12/05/2014  [employer] Interactive Patient Education ©2016 Elsevier Inc.         PREVENTING SURGICAL SITE INFECTIONS     Surgical Site Infections FAQs  What is a Surgical Site Infection (SSI)?  A surgical site infection is an infection that occurs after surgery in the part of the body where the surgery took place. Most patients who have surgery do not develop an infection. However, infections develop in about 1 to 3 out of every 100 patients who have surgery.  Some of the common symptoms of a surgical site infection are:  · Redness and pain around the area where you had surgery  · Drainage of cloudy fluid from your surgical wound  · Fever  Can SSIs be treated?  Yes. Most surgical site infections can be treated with antibiotics. The antibiotic given to you depends on the bacteria (germs) causing the infection. Sometimes patients with SSIs also need another surgery to treat the infection.  What are some of the things that hospitals are doing to prevent  SSIs?  To prevent SSIs, doctors, nurses, and other healthcare providers:  · Clean their hands and arms up to their elbows with an antiseptic agent just before the surgery.  · Clean their hands with soap and water or an alcohol-based hand rub before and after caring for each patient.  · May remove some of your hair immediately before your surgery using electric clippers if the hair is in the same area where the procedure will occur. They should not shave you with a razor.  · Wear special hair covers, masks, gowns, and gloves during surgery to keep the surgery area clean.  · Give you antibiotics before your surgery starts. In most cases, you should get antibiotics within 60 minutes before the surgery starts and the antibiotics should be stopped within 24 hours after surgery.  · Clean the skin at the site of your surgery with a special soap that kills germs.  What can I do to help prevent SSIs?  Before your surgery:  · Tell your doctor about other medical problems you may have. Health problems such as allergies, diabetes, and obesity could affect your surgery and your treatment.  · Quit smoking. Patients who smoke get more infections. Talk to your doctor about how you can quit before your surgery.  · Do not shave near where you will have surgery. Shaving with a razor can irritate your skin and make it easier to develop an infection.  At the time of your surgery:  · Speak up if someone tries to shave you with a razor before surgery. Ask why you need to be shaved and talk with your surgeon if you have any concerns.  · Ask if you will get antibiotics before surgery.  After your surgery:  · Make sure that your healthcare providers clean their hands before examining you, either with soap and water or an alcohol-based hand rub.    If you do not see your providers clean their hands, please ask them to do so.  · Family and friends who visit you should not touch the surgical wound or dressings.  · Family and friends should  clean their hands with soap and water or an alcohol-based hand rub before and after visiting you. If you do not see them clean their hands, ask them to clean their hands.  What do I need to do when I go home from the hospital?  · Before you go home, your doctor or nurse should explain everything you need to know about taking care of your wound. Make sure you understand how to care for your wound before you leave the hospital.  · Always clean your hands before and after caring for your wound.  · Before you go home, make sure you know who to contact if you have questions or problems after you get home.  · If you have any symptoms of an infection, such as redness and pain at the surgery site, drainage, or fever, call your doctor immediately.  If you have additional questions, please ask your doctor or nurse.  Developed and co-sponsored by The Society for Healthcare Epidemiology of [place] (SHEA); Infectious Diseases Society of [place] (IDSA); American Hospital Association; Association for Professionals in Infection Control and Epidemiology (APIC); Centers for Disease Control and Prevention (CDC); and The Joint Commission.     This information is not intended to replace advice given to you by your health care provider. Make sure you discuss any questions you have with your health care provider.     Document Released: 12/23/2014 Document Revised: 01/08/2016 Document Reviewed: 03/02/2016  [employer] Interactive Patient Education ©2016 [employer] Inc[employer]             SYMPTOMS OF A STROKE    Call 911 or have someone take you to the Emergency Department if you have any of the following:    · Sudden numbness or weakness of your face, arm or leg especially on one side of the body  · Sudden confusion, diffiiculty speaking or trouble understanding   · Changes in your vision or loss of sight in one eye  · Sudden severe headache with no known cause  · sudden dizziness, trouble walking, loss of balance or coordination    It is important to  seek emergency care right away if you have further stroke symptoms. If you get emergency help quickly, the powerful clot-dissolving medicines can reduce the disabilities caused by a stroke.     For more information:    American Stroke Association  9-000-2-STROKE  www.strokeassociation.org           IF YOU SMOKE OR USE TOBACCO PLEASE READ THE FOLLOWING:    Why is smoking bad for me?  Smoking increases the risk of heart disease, lung disease, vascular disease, stroke, and cancer.     If you smoke, STOP!    If you would like more information on quitting smoking, please visit the [employer]â€™s website: www."[employer]/NEURONIX/healthier-together/smoke   This link will provide additional resources including the QUIT line and the Beat the Pack support groups.     For more information:    American Cancer Society  (806) 101-1160    American Heart Association  1-777.387.6105               YOU ARE THE MOST IMPORTANT FACTOR IN YOUR RECOVERY.     Follow all instructions carefully.     I have reviewed my discharge instructions with my nurse, including the following information, if applicable:     Information about my illness and diagnosis   Follow up appointments (including lab draws)   Wound Care   Equipment Needs   Medications (new and continuing) along with side effects   Preventative information such as vaccines and smoking cessations   Diet   Pain   I know when to contact my Doctor's office or seek emergency care      I want my nurse to describe the side effects of my medications: YES NO   If the answer is no, I understand the side effects of my medications: YES NO   My nurse described the side effects of my medications in a way that I could understand: YES NO   I have taken my personal belongings and my own medications with me at discharge: YES NO            I have received this information and my questions have been answered. I have discussed any concerns I see with this plan with the nurse or physician. I  understand these instructions.    Signature of Patient or Responsible Person: _____________________________________    Date: _________________  Time: __________________    Signature of Healthcare Provider: _______________________________________  Date: _________________  Time: __________________

## 2017-01-19 LAB
LAB AP CASE REPORT: NORMAL
Lab: NORMAL
PATH REPORT.FINAL DX SPEC: NORMAL

## 2017-02-01 ENCOUNTER — TELEPHONE (OUTPATIENT)
Dept: FAMILY MEDICINE CLINIC | Facility: CLINIC | Age: 52
End: 2017-02-01

## 2017-02-01 NOTE — TELEPHONE ENCOUNTER
----- Message from Sil Celis sent at 2/1/2017 12:49 PM EST -----  Patient wants to know if Dr.Van Burns can work her in on the Feb 6th or 8th. She has an appt that day with  and drives from [facility]..  Thanks,  Sil..

## 2017-02-08 ENCOUNTER — OFFICE VISIT (OUTPATIENT)
Dept: FAMILY MEDICINE CLINIC | Facility: CLINIC | Age: 52
End: 2017-02-08

## 2017-02-08 VITALS
SYSTOLIC BLOOD PRESSURE: 120 MMHG | OXYGEN SATURATION: 96 % | HEIGHT: 66 IN | WEIGHT: 164 LBS | BODY MASS INDEX: 26.36 KG/M2 | DIASTOLIC BLOOD PRESSURE: 78 MMHG | HEART RATE: 99 BPM | TEMPERATURE: 98.4 F

## 2017-02-08 DIAGNOSIS — F41.9 ANXIETY: ICD-10-CM

## 2017-02-08 DIAGNOSIS — M25.562 CHRONIC PAIN OF LEFT KNEE: Primary | ICD-10-CM

## 2017-02-08 DIAGNOSIS — G89.29 CHRONIC PAIN OF LEFT KNEE: Primary | ICD-10-CM

## 2017-02-08 DIAGNOSIS — K21.00 GASTROESOPHAGEAL REFLUX DISEASE WITH ESOPHAGITIS: ICD-10-CM

## 2017-02-08 DIAGNOSIS — K44.9 HIATAL HERNIA: ICD-10-CM

## 2017-02-08 DIAGNOSIS — S82.142D TIBIAL PLATEAU FRACTURE, LEFT, CLOSED, WITH ROUTINE HEALING, SUBSEQUENT ENCOUNTER: ICD-10-CM

## 2017-02-08 PROCEDURE — 99213 OFFICE O/P EST LOW 20 MIN: CPT | Performed by: FAMILY MEDICINE

## 2017-02-08 RX ORDER — OXYCODONE AND ACETAMINOPHEN 10; 325 MG/1; MG/1
1 TABLET ORAL EVERY 6 HOURS PRN
Qty: 90 TABLET | Refills: 0 | Status: SHIPPED | OUTPATIENT
Start: 2017-02-08 | End: 2017-03-06 | Stop reason: SDUPTHER

## 2017-02-08 RX ORDER — ALPRAZOLAM 1 MG/1
1 TABLET ORAL 3 TIMES DAILY
Qty: 90 TABLET | Refills: 5 | Status: SHIPPED | OUTPATIENT
Start: 2017-02-08 | End: 2017-05-01 | Stop reason: SDUPTHER

## 2017-02-09 NOTE — PROGRESS NOTES
Subjective   Yuliana Kennedy is a 51 y.o. female    HPI Comments: Chronic pain of right knee related to previous severe fracture with opoid dependence. Chronic anxiety stable. Due for medication refills. Claudio is appropriate. Has appointment with Ortho today. No acute complaints.    Knee Pain      Anxiety   Patient reports no confusion, decreased concentration, dizziness, nausea, nervous/anxious behavior, shortness of breath or suicidal ideas.           The following portions of the patient's history were reviewed and updated as appropriate: allergies, current medications, past social history and problem list    Review of Systems   Constitutional: Negative for appetite change and fatigue.   Respiratory: Negative for chest tightness and shortness of breath.    Gastrointestinal: Negative for abdominal pain, diarrhea and nausea.   Musculoskeletal: Positive for arthralgias and gait problem.   Neurological: Negative for dizziness, tremors, weakness, light-headedness and headaches.   Psychiatric/Behavioral: Negative for agitation, behavioral problems, confusion, decreased concentration, dysphoric mood, sleep disturbance and suicidal ideas. The patient is not nervous/anxious.        Objective     Vitals:    02/08/17 1358   BP: 120/78   Pulse: 99   Temp: 98.4 °F (36.9 °C)   SpO2: 96%       Physical Exam   Constitutional: She is oriented to person, place, and time. She appears well-developed and well-nourished.   Cardiovascular: Normal rate and regular rhythm.    Pulmonary/Chest: Effort normal and breath sounds normal.   Musculoskeletal:        Right knee: She exhibits decreased range of motion and deformity. She exhibits no swelling, no effusion and no erythema.   Neurological: She is alert and oriented to person, place, and time.   Psychiatric: Her speech is normal and behavior is normal. Judgment normal. Her mood appears anxious. Cognition and memory are normal.   Nursing note and vitals reviewed.      Assessment/Plan    Problem List Items Addressed This Visit        Respiratory    Hiatal hernia       Digestive    GERD (gastroesophageal reflux disease)       Musculoskeletal and Integument    Tibial plateau fracture    Relevant Medications    oxyCODONE-acetaminophen (PERCOCET)  MG per tablet      Other Visit Diagnoses     Chronic pain of left knee    -  Primary    Relevant Medications    oxyCODONE-acetaminophen (PERCOCET)  MG per tablet    Anxiety        Relevant Medications    ALPRAZolam (XANAX) 1 MG tablet

## 2017-03-06 ENCOUNTER — OFFICE VISIT (OUTPATIENT)
Dept: FAMILY MEDICINE CLINIC | Facility: CLINIC | Age: 52
End: 2017-03-06

## 2017-03-06 VITALS
TEMPERATURE: 98.3 F | OXYGEN SATURATION: 98 % | SYSTOLIC BLOOD PRESSURE: 100 MMHG | DIASTOLIC BLOOD PRESSURE: 68 MMHG | HEIGHT: 66 IN | HEART RATE: 76 BPM | WEIGHT: 168 LBS | BODY MASS INDEX: 27 KG/M2

## 2017-03-06 DIAGNOSIS — Z72.0 TOBACCO USE: ICD-10-CM

## 2017-03-06 DIAGNOSIS — S82.142D TIBIAL PLATEAU FRACTURE, LEFT, CLOSED, WITH ROUTINE HEALING, SUBSEQUENT ENCOUNTER: ICD-10-CM

## 2017-03-06 DIAGNOSIS — Z00.00 ROUTINE GENERAL MEDICAL EXAMINATION AT A HEALTH CARE FACILITY: Primary | ICD-10-CM

## 2017-03-06 DIAGNOSIS — G89.29 CHRONIC PAIN OF LEFT KNEE: ICD-10-CM

## 2017-03-06 DIAGNOSIS — M25.562 CHRONIC PAIN OF LEFT KNEE: ICD-10-CM

## 2017-03-06 PROCEDURE — 99406 BEHAV CHNG SMOKING 3-10 MIN: CPT | Performed by: FAMILY MEDICINE

## 2017-03-06 PROCEDURE — 99396 PREV VISIT EST AGE 40-64: CPT | Performed by: FAMILY MEDICINE

## 2017-03-06 RX ORDER — OXYCODONE AND ACETAMINOPHEN 10; 325 MG/1; MG/1
1 TABLET ORAL EVERY 6 HOURS PRN
Qty: 90 TABLET | Refills: 0 | Status: SHIPPED | OUTPATIENT
Start: 2017-03-06 | End: 2017-04-05 | Stop reason: SDUPTHER

## 2017-03-06 RX ORDER — LORATADINE 10 MG/1
TABLET ORAL
Refills: 11 | COMMUNITY
Start: 2017-02-08 | End: 2017-05-01

## 2017-03-06 NOTE — PROGRESS NOTES
Subjective   Yuliana Kennedy is a 51 y.o. female    HPI Comments: Patient presents for annual health examination as well as a follow-up of her chronic left knee pain.  Her local orthopedist is moving and he has referred her to Dr. Watson at the [facility] for evaluation of her left knee.  Patient has multiple questions about this referral.  I recommended that she meet with him and see if he can answer her questions.  Health maintenance standpoint the patient had a colonoscopy 2 months ago.  She refuses vaccines including flu and pneumonia or shingles because she does not like shots.  She had a tetanus 2 years ago when she fractured her left knee.  She is agreeable to get lab work when she is fasting and we'll do this at [facility] in [place].  She is also due for mammogram but refuses to do this at this time.    Patient is counseled for 3 minutes minimum regarding adverse effects of smoking including lung disease such as COPD and emphysema as well as increased risk for lung cancer. Also stressed was increased risk for heart attack and stroke. Smoking cessation adjuncts including nicotine replacement with patches, gum or lozenges as well as prescription bupropion and Chantix were offered.    Leg Pain          The following portions of the patient's history were reviewed and updated as appropriate: allergies, current medications, past social history and problem list    Review of Systems   Constitutional: Negative.    HENT: Negative.    Eyes: Negative.    Respiratory: Positive for cough and wheezing.    Cardiovascular: Negative.    Gastrointestinal: Negative.    Endocrine: Negative.    Genitourinary: Negative.    Musculoskeletal: Positive for arthralgias and gait problem.   Skin: Negative.    Allergic/Immunologic: Negative.    Hematological: Negative.    Psychiatric/Behavioral: The patient is nervous/anxious.    All other systems reviewed and are negative.      Objective     Vitals:    03/06/17 1334    BP: 100/68   Pulse: 76   Temp: 98.3 °F (36.8 °C)   SpO2: 98%       Physical Exam   Constitutional: She is oriented to person, place, and time. She appears well-developed and well-nourished.   HENT:   Head: Normocephalic and atraumatic.   Right Ear: External ear normal.   Left Ear: External ear normal.   Nose: Nose normal.   Mouth/Throat: Oropharynx is clear and moist.   Eyes: Conjunctivae and EOM are normal. Pupils are equal, round, and reactive to light.   Neck: Normal range of motion. Neck supple. No thyromegaly present.   Cardiovascular: Normal rate, regular rhythm, normal heart sounds and intact distal pulses.    No murmur heard.  Pulmonary/Chest: Effort normal and breath sounds normal.   Abdominal: Soft. Bowel sounds are normal. She exhibits no mass. There is no tenderness.   Musculoskeletal: She exhibits no edema.        Left knee: She exhibits decreased range of motion, deformity and bony tenderness. She exhibits no swelling, no effusion and no ecchymosis. Tenderness found.   Lymphadenopathy:     She has no cervical adenopathy.   Neurological: She is alert and oriented to person, place, and time. She has normal reflexes. No cranial nerve deficit.   Skin: Skin is warm and dry.   Psychiatric: She has a normal mood and affect. Her behavior is normal.   Nursing note and vitals reviewed.      Assessment/Plan   Problem List Items Addressed This Visit        Musculoskeletal and Integument    Tibial plateau fracture    Relevant Medications    oxyCODONE-acetaminophen (PERCOCET)  MG per tablet       Other    Tobacco use      Other Visit Diagnoses     Routine general medical examination at a health care facility    -  Primary    Relevant Orders    CBC (No Diff)    Comprehensive Metabolic Panel    Lipid Panel    TSH    Hepatitis C Antibody    Chronic pain of left knee        Relevant Medications    oxyCODONE-acetaminophen (PERCOCET)  MG per tablet       Patient is counseled today regarding smoking  cessation.  Please see the subjective section of the note for details.    Patient is counseled during today's visit regarding preventative health measures to include use of seatbelts, medication safety, healthy diet and regular exercise.

## 2017-04-05 ENCOUNTER — OFFICE VISIT (OUTPATIENT)
Dept: FAMILY MEDICINE CLINIC | Facility: CLINIC | Age: 52
End: 2017-04-05

## 2017-04-05 VITALS
SYSTOLIC BLOOD PRESSURE: 128 MMHG | BODY MASS INDEX: 27 KG/M2 | HEIGHT: 66 IN | TEMPERATURE: 98.2 F | WEIGHT: 168 LBS | OXYGEN SATURATION: 96 % | DIASTOLIC BLOOD PRESSURE: 78 MMHG | HEART RATE: 71 BPM

## 2017-04-05 DIAGNOSIS — Z72.0 TOBACCO USE: ICD-10-CM

## 2017-04-05 DIAGNOSIS — J01.01 ACUTE RECURRENT MAXILLARY SINUSITIS: ICD-10-CM

## 2017-04-05 DIAGNOSIS — G89.29 CHRONIC PAIN OF LEFT KNEE: Primary | ICD-10-CM

## 2017-04-05 DIAGNOSIS — S82.142D TIBIAL PLATEAU FRACTURE, LEFT, CLOSED, WITH ROUTINE HEALING, SUBSEQUENT ENCOUNTER: ICD-10-CM

## 2017-04-05 DIAGNOSIS — M25.562 CHRONIC PAIN OF LEFT KNEE: Primary | ICD-10-CM

## 2017-04-05 PROCEDURE — 99214 OFFICE O/P EST MOD 30 MIN: CPT | Performed by: FAMILY MEDICINE

## 2017-04-05 RX ORDER — OXYCODONE AND ACETAMINOPHEN 10; 325 MG/1; MG/1
1 TABLET ORAL EVERY 6 HOURS PRN
Qty: 90 TABLET | Refills: 0 | Status: SHIPPED | OUTPATIENT
Start: 2017-04-05 | End: 2017-05-01 | Stop reason: SDUPTHER

## 2017-04-05 RX ORDER — CEPHALEXIN 500 MG/1
500 CAPSULE ORAL 2 TIMES DAILY
Qty: 20 CAPSULE | Refills: 2 | Status: SHIPPED | OUTPATIENT
Start: 2017-04-05 | End: 2017-05-01

## 2017-04-05 NOTE — PROGRESS NOTES
Subjective   Yuliana Kennedy is a 51 y.o. female    HPI Comments: Patient presents for recheck regarding her chronic knee pain due to previous severe fracture.  She has an appointment May 3 at the [facility] with Dr. Watson.  She is very anxious about this.  She is due for refill on her analgesics.  Her Claudio is reviewed and is appropriate.  She is complaining of recurring sinus symptoms with sinus pain and pressure, postnasal sinus drainage, sore throat and ear pressure.  She has not quit smoking.    Knee Pain      Sinusitis   Associated symptoms include congestion, ear pain, headaches and sinus pressure. Pertinent negatives include no chills, coughing, shortness of breath or sore throat.   Headache    Associated symptoms include ear pain, rhinorrhea and sinus pressure. Pertinent negatives include no coughing, dizziness, fever or sore throat.       The following portions of the patient's history were reviewed and updated as appropriate: allergies, current medications, past social history and problem list    Review of Systems   Constitutional: Negative.  Negative for chills, fatigue and fever.   HENT: Positive for congestion, ear pain, postnasal drip, rhinorrhea and sinus pressure. Negative for sore throat.    Eyes: Negative.    Respiratory: Negative for cough, shortness of breath and wheezing.    Cardiovascular: Negative.    Gastrointestinal: Negative.    Endocrine: Negative.    Genitourinary: Negative.    Musculoskeletal: Positive for arthralgias and gait problem.   Skin: Negative.    Allergic/Immunologic: Negative.    Neurological: Positive for headaches. Negative for dizziness.   Hematological: Negative.  Negative for adenopathy.   Psychiatric/Behavioral: The patient is nervous/anxious.    All other systems reviewed and are negative.      Objective     Vitals:    04/05/17 1249   BP: 128/78   Pulse: 71   Temp: 98.2 °F (36.8 °C)   SpO2: 96%       Physical Exam   Constitutional: She is oriented to person,  place, and time. She appears well-developed and well-nourished.   HENT:   Head: Normocephalic and atraumatic.   Right Ear: Tympanic membrane, external ear and ear canal normal.   Left Ear: Tympanic membrane, external ear and ear canal normal.   Nose: Nose normal.   Mouth/Throat: Oropharynx is clear and moist. No oropharyngeal exudate.   Eyes: Conjunctivae and EOM are normal. Pupils are equal, round, and reactive to light.   Neck: Normal range of motion. Neck supple. No thyromegaly present.   Cardiovascular: Normal rate, regular rhythm, normal heart sounds and intact distal pulses.    No murmur heard.  Pulmonary/Chest: Effort normal and breath sounds normal.   Abdominal: Soft. Bowel sounds are normal. She exhibits no mass. There is no tenderness.   Musculoskeletal: She exhibits no edema.   Lymphadenopathy:     She has no cervical adenopathy.   Neurological: She is alert and oriented to person, place, and time. She has normal reflexes. No cranial nerve deficit.   Skin: Skin is warm and dry.   Psychiatric: She has a normal mood and affect. Her behavior is normal.   Nursing note and vitals reviewed.      Assessment/Plan   Problem List Items Addressed This Visit        Musculoskeletal and Integument    Tibial plateau fracture    Relevant Medications    oxyCODONE-acetaminophen (PERCOCET)  MG per tablet       Other    Tobacco use      Other Visit Diagnoses     Chronic pain of left knee    -  Primary    Relevant Medications    oxyCODONE-acetaminophen (PERCOCET)  MG per tablet    Acute recurrent maxillary sinusitis        Relevant Medications    cephalexin (KEFLEX) 500 MG capsule

## 2017-05-01 ENCOUNTER — OFFICE VISIT (OUTPATIENT)
Dept: FAMILY MEDICINE CLINIC | Facility: CLINIC | Age: 52
End: 2017-05-01

## 2017-05-01 VITALS
HEIGHT: 66 IN | HEART RATE: 77 BPM | TEMPERATURE: 98 F | SYSTOLIC BLOOD PRESSURE: 124 MMHG | BODY MASS INDEX: 27 KG/M2 | DIASTOLIC BLOOD PRESSURE: 74 MMHG | OXYGEN SATURATION: 98 % | WEIGHT: 168 LBS

## 2017-05-01 DIAGNOSIS — G89.29 CHRONIC PAIN OF LEFT KNEE: ICD-10-CM

## 2017-05-01 DIAGNOSIS — K21.00 GASTROESOPHAGEAL REFLUX DISEASE WITH ESOPHAGITIS: ICD-10-CM

## 2017-05-01 DIAGNOSIS — M25.562 CHRONIC PAIN OF LEFT KNEE: ICD-10-CM

## 2017-05-01 DIAGNOSIS — F41.9 ANXIETY: ICD-10-CM

## 2017-05-01 DIAGNOSIS — S82.142D TIBIAL PLATEAU FRACTURE, LEFT, CLOSED, WITH ROUTINE HEALING, SUBSEQUENT ENCOUNTER: ICD-10-CM

## 2017-05-01 PROCEDURE — 99214 OFFICE O/P EST MOD 30 MIN: CPT | Performed by: FAMILY MEDICINE

## 2017-05-01 RX ORDER — RANITIDINE 300 MG/1
TABLET ORAL
Qty: 30 TABLET | Refills: 11 | Status: SHIPPED | OUTPATIENT
Start: 2017-05-01 | End: 2018-03-30 | Stop reason: SDUPTHER

## 2017-05-01 RX ORDER — ALPRAZOLAM 1 MG/1
1 TABLET ORAL 3 TIMES DAILY
Qty: 90 TABLET | Refills: 5 | Status: SHIPPED | OUTPATIENT
Start: 2017-05-01 | End: 2017-08-21 | Stop reason: SDUPTHER

## 2017-05-01 RX ORDER — OXYCODONE AND ACETAMINOPHEN 10; 325 MG/1; MG/1
1 TABLET ORAL EVERY 6 HOURS PRN
Qty: 90 TABLET | Refills: 0 | Status: SHIPPED | OUTPATIENT
Start: 2017-05-01 | End: 2017-05-30 | Stop reason: SDUPTHER

## 2017-05-26 ENCOUNTER — TELEPHONE (OUTPATIENT)
Dept: FAMILY MEDICINE CLINIC | Facility: CLINIC | Age: 52
End: 2017-05-26

## 2017-05-26 RX ORDER — LEVOFLOXACIN 500 MG/1
500 TABLET, FILM COATED ORAL DAILY
Qty: 10 TABLET | Refills: 0 | Status: SHIPPED | OUTPATIENT
Start: 2017-05-26 | End: 2017-06-27

## 2017-05-30 ENCOUNTER — OFFICE VISIT (OUTPATIENT)
Dept: FAMILY MEDICINE CLINIC | Facility: CLINIC | Age: 52
End: 2017-05-30

## 2017-05-30 VITALS
WEIGHT: 168 LBS | OXYGEN SATURATION: 99 % | TEMPERATURE: 98 F | DIASTOLIC BLOOD PRESSURE: 68 MMHG | HEART RATE: 72 BPM | HEIGHT: 66 IN | SYSTOLIC BLOOD PRESSURE: 100 MMHG | BODY MASS INDEX: 27 KG/M2

## 2017-05-30 DIAGNOSIS — J40 BRONCHITIS: ICD-10-CM

## 2017-05-30 DIAGNOSIS — G89.29 CHRONIC PAIN OF LEFT KNEE: Primary | ICD-10-CM

## 2017-05-30 DIAGNOSIS — M25.562 CHRONIC PAIN OF LEFT KNEE: Primary | ICD-10-CM

## 2017-05-30 DIAGNOSIS — S82.142D TIBIAL PLATEAU FRACTURE, LEFT, CLOSED, WITH ROUTINE HEALING, SUBSEQUENT ENCOUNTER: ICD-10-CM

## 2017-05-30 PROCEDURE — 99213 OFFICE O/P EST LOW 20 MIN: CPT | Performed by: FAMILY MEDICINE

## 2017-05-30 RX ORDER — OXYCODONE AND ACETAMINOPHEN 10; 325 MG/1; MG/1
1 TABLET ORAL EVERY 6 HOURS PRN
Qty: 90 TABLET | Refills: 0 | Status: SHIPPED | OUTPATIENT
Start: 2017-05-30 | End: 2017-06-27 | Stop reason: SDUPTHER

## 2017-05-30 RX ORDER — PROMETHAZINE HYDROCHLORIDE AND CODEINE PHOSPHATE 6.25; 1 MG/5ML; MG/5ML
5 SYRUP ORAL EVERY 6 HOURS PRN
Qty: 240 ML | Refills: 0 | Status: SHIPPED | OUTPATIENT
Start: 2017-05-30 | End: 2018-02-05

## 2017-05-30 RX ORDER — FEXOFENADINE HCL 180 MG/1
TABLET ORAL
Refills: 5 | COMMUNITY
Start: 2017-05-02 | End: 2017-10-16 | Stop reason: SDUPTHER

## 2017-06-27 ENCOUNTER — OFFICE VISIT (OUTPATIENT)
Dept: FAMILY MEDICINE CLINIC | Facility: CLINIC | Age: 52
End: 2017-06-27

## 2017-06-27 VITALS
DIASTOLIC BLOOD PRESSURE: 84 MMHG | TEMPERATURE: 98.8 F | WEIGHT: 168 LBS | HEART RATE: 78 BPM | HEIGHT: 66 IN | OXYGEN SATURATION: 98 % | SYSTOLIC BLOOD PRESSURE: 102 MMHG | BODY MASS INDEX: 27 KG/M2

## 2017-06-27 DIAGNOSIS — G89.29 CHRONIC PAIN OF LEFT KNEE: Primary | ICD-10-CM

## 2017-06-27 DIAGNOSIS — Z72.0 TOBACCO USE: ICD-10-CM

## 2017-06-27 DIAGNOSIS — S82.142D TIBIAL PLATEAU FRACTURE, LEFT, CLOSED, WITH ROUTINE HEALING, SUBSEQUENT ENCOUNTER: ICD-10-CM

## 2017-06-27 DIAGNOSIS — M25.562 CHRONIC PAIN OF LEFT KNEE: Primary | ICD-10-CM

## 2017-06-27 PROCEDURE — 99213 OFFICE O/P EST LOW 20 MIN: CPT | Performed by: FAMILY MEDICINE

## 2017-06-27 RX ORDER — OXYCODONE AND ACETAMINOPHEN 10; 325 MG/1; MG/1
1 TABLET ORAL EVERY 6 HOURS PRN
Qty: 90 TABLET | Refills: 0 | Status: SHIPPED | OUTPATIENT
Start: 2017-06-27 | End: 2017-07-20 | Stop reason: SDUPTHER

## 2017-06-27 RX ORDER — NICOTINE 21 MG/24HR
1 PATCH, TRANSDERMAL 24 HOURS TRANSDERMAL EVERY 24 HOURS
Qty: 14 PATCH | Refills: 2
Start: 2017-06-27 | End: 2018-08-01

## 2017-06-27 NOTE — PROGRESS NOTES
Subjective   Yuliana Kennedy is a 51 y.o. female    HPI Comments: Patient presents for recheck regarding her persistent left knee pain secondary to previous fractures and surgery.  She is scheduled on July 11 to have her hardware removed with tentative schedule later this year to have a total knee replacement if needed.  She is quite anxious about her surgery.  She has cut way back on her smoking and needs a prescription for nicotine patch.  She had a tooth pulled that was apparently decayed.  She has no acute complaints today.  Her Claudio is reviewed and is appropriate.      The following portions of the patient's history were reviewed and updated as appropriate: allergies, current medications, past social history and problem list    Review of Systems   Constitutional: Negative for chills, fatigue and fever.   HENT: Negative for congestion, ear pain, postnasal drip, rhinorrhea, sinus pressure and sore throat.    Eyes: Negative.    Respiratory: Negative for cough, shortness of breath and wheezing.    Cardiovascular: Negative.    Gastrointestinal: Negative.    Endocrine: Negative.    Genitourinary: Negative.    Musculoskeletal: Positive for arthralgias and gait problem.   Skin: Negative for rash and wound.   Allergic/Immunologic: Negative.    Neurological: Positive for weakness. Negative for dizziness and headaches.   Hematological: Negative.  Negative for adenopathy.   Psychiatric/Behavioral: The patient is nervous/anxious.    All other systems reviewed and are negative.      Objective     Vitals:    06/27/17 1508   BP: 102/84   Pulse: 78   Temp: 98.8 °F (37.1 °C)   SpO2: 98%       Physical Exam   Constitutional: She is oriented to person, place, and time. She appears well-developed and well-nourished.   HENT:   Head: Normocephalic and atraumatic.   Right Ear: Tympanic membrane, external ear and ear canal normal.   Left Ear: Tympanic membrane, external ear and ear canal normal.   Nose: Nose normal.   Mouth/Throat:  Oropharynx is clear and moist. No oropharyngeal exudate.   Eyes: Conjunctivae and EOM are normal. Pupils are equal, round, and reactive to light.   Neck: Normal range of motion. Neck supple. No thyromegaly present.   Cardiovascular: Normal rate, regular rhythm, normal heart sounds and intact distal pulses.    No murmur heard.  Pulmonary/Chest: Effort normal and breath sounds normal.   Abdominal: Soft. Bowel sounds are normal. She exhibits no mass. There is no tenderness.   Musculoskeletal: She exhibits no edema.        Left knee: She exhibits deformity. She exhibits no swelling and no effusion. Tenderness found.   Lymphadenopathy:     She has no cervical adenopathy.   Neurological: She is alert and oriented to person, place, and time. She has normal reflexes. No cranial nerve deficit.   Skin: Skin is warm and dry.   Psychiatric: She has a normal mood and affect. Her behavior is normal.   Nursing note and vitals reviewed.      Assessment/Plan   Problem List Items Addressed This Visit        Musculoskeletal and Integument    Tibial plateau fracture    Relevant Medications    oxyCODONE-acetaminophen (PERCOCET)  MG per tablet       Other    Tobacco use    Relevant Medications    nicotine (NICODERM CQ) 21 MG/24HR patch      Other Visit Diagnoses     Chronic pain of left knee    -  Primary    Relevant Medications    oxyCODONE-acetaminophen (PERCOCET)  MG per tablet

## 2017-07-20 ENCOUNTER — OFFICE VISIT (OUTPATIENT)
Dept: FAMILY MEDICINE CLINIC | Facility: CLINIC | Age: 52
End: 2017-07-20

## 2017-07-20 VITALS
HEART RATE: 60 BPM | SYSTOLIC BLOOD PRESSURE: 110 MMHG | DIASTOLIC BLOOD PRESSURE: 70 MMHG | HEIGHT: 66 IN | OXYGEN SATURATION: 99 % | TEMPERATURE: 98.8 F

## 2017-07-20 DIAGNOSIS — Z98.890 S/P LEFT KNEE SURGERY: ICD-10-CM

## 2017-07-20 DIAGNOSIS — G89.29 CHRONIC PAIN OF LEFT KNEE: Primary | ICD-10-CM

## 2017-07-20 DIAGNOSIS — M25.562 CHRONIC PAIN OF LEFT KNEE: Primary | ICD-10-CM

## 2017-07-20 PROCEDURE — 99213 OFFICE O/P EST LOW 20 MIN: CPT | Performed by: FAMILY MEDICINE

## 2017-07-20 RX ORDER — OXYCODONE AND ACETAMINOPHEN 10; 325 MG/1; MG/1
1 TABLET ORAL EVERY 6 HOURS PRN
Qty: 120 TABLET | Refills: 0 | Status: SHIPPED | OUTPATIENT
Start: 2017-07-20 | End: 2017-08-21 | Stop reason: SDUPTHER

## 2017-07-20 NOTE — PROGRESS NOTES
Subjective   Yuliana Kennedy is a 51 y.o. female    HPI Comments: Patient presents for recheck regarding her left knee pain.  She is approximately one week status post hardware removal from her surgery 2 years ago.  Her sutures are still in place but she has no appointment next week with her surgeon to have those removed.  She is weightbearing and ambulating with a cane.  She is very positive and feels like she is in much less pain than she was after her surgery 2 years ago. apparently they had to take out a lot of scar tissue but were able to get good range of motion after doing so.  She has been compliant with her follow-up visits and postop instructions.  She is due for oxycodone prescription refill.  I'm going to increase her amount because of her postop pain even though they did give her some Percocet at .  This is reflected on her Claudio which is appropriate    Knee Pain          The following portions of the patient's history were reviewed and updated as appropriate: allergies, current medications, past social history and problem list    Review of Systems   Constitutional: Negative for chills, fatigue and fever.   HENT: Negative for congestion, ear pain, postnasal drip, rhinorrhea, sinus pressure and sore throat.    Eyes: Negative.    Respiratory: Negative for cough, shortness of breath and wheezing.    Cardiovascular: Negative.    Gastrointestinal: Negative.    Endocrine: Negative.    Genitourinary: Negative.    Musculoskeletal: Positive for arthralgias and gait problem.   Skin: Negative for rash and wound.   Allergic/Immunologic: Negative.    Neurological: Positive for weakness. Negative for dizziness and headaches.   Hematological: Negative.  Negative for adenopathy.   Psychiatric/Behavioral: The patient is nervous/anxious.    All other systems reviewed and are negative.      Objective     Vitals:    07/20/17 1509   BP: 110/70   Pulse: 60   Temp: 98.8 °F (37.1 °C)   SpO2: 99%       Physical Exam    Constitutional: She is oriented to person, place, and time. She appears well-developed and well-nourished.   HENT:   Head: Normocephalic and atraumatic.   Right Ear: Tympanic membrane, external ear and ear canal normal.   Left Ear: Tympanic membrane, external ear and ear canal normal.   Nose: Nose normal.   Mouth/Throat: Oropharynx is clear and moist. No oropharyngeal exudate.   Eyes: Conjunctivae and EOM are normal. Pupils are equal, round, and reactive to light.   Neck: Normal range of motion. Neck supple. No thyromegaly present.   Cardiovascular: Normal rate, regular rhythm, normal heart sounds and intact distal pulses.    No murmur heard.  Pulmonary/Chest: Effort normal and breath sounds normal.   Abdominal: Soft. Bowel sounds are normal. She exhibits no mass. There is no tenderness.   Musculoskeletal: She exhibits no edema.        Left knee: She exhibits deformity. She exhibits no swelling and no effusion. Tenderness found.   Post surgical knee with appropriate swelling. Sutures intact with minimal erythema, minimal tenderness and no drainage. ROM of knee appropriate without pain.   Lymphadenopathy:     She has no cervical adenopathy.   Neurological: She is alert and oriented to person, place, and time. She has normal reflexes. No cranial nerve deficit.   Skin: Skin is warm and dry.   Psychiatric: She has a normal mood and affect. Her behavior is normal.   Nursing note and vitals reviewed.      Assessment/Plan   Problem List Items Addressed This Visit     None      Visit Diagnoses     Chronic pain of left knee    -  Primary    Relevant Medications    oxyCODONE-acetaminophen (PERCOCET)  MG per tablet    S/P left knee surgery

## 2017-08-21 ENCOUNTER — OFFICE VISIT (OUTPATIENT)
Dept: FAMILY MEDICINE CLINIC | Facility: CLINIC | Age: 52
End: 2017-08-21

## 2017-08-21 VITALS
SYSTOLIC BLOOD PRESSURE: 142 MMHG | TEMPERATURE: 98.9 F | BODY MASS INDEX: 26.84 KG/M2 | DIASTOLIC BLOOD PRESSURE: 80 MMHG | OXYGEN SATURATION: 98 % | HEART RATE: 75 BPM | WEIGHT: 167 LBS | HEIGHT: 66 IN

## 2017-08-21 DIAGNOSIS — G89.29 CHRONIC PAIN OF LEFT KNEE: ICD-10-CM

## 2017-08-21 DIAGNOSIS — M25.562 CHRONIC PAIN OF LEFT KNEE: ICD-10-CM

## 2017-08-21 DIAGNOSIS — F41.9 ANXIETY: ICD-10-CM

## 2017-08-21 PROCEDURE — 99213 OFFICE O/P EST LOW 20 MIN: CPT | Performed by: FAMILY MEDICINE

## 2017-08-21 RX ORDER — OXYCODONE AND ACETAMINOPHEN 10; 325 MG/1; MG/1
1 TABLET ORAL EVERY 6 HOURS PRN
Qty: 120 TABLET | Refills: 0 | Status: SHIPPED | OUTPATIENT
Start: 2017-08-21 | End: 2017-09-19 | Stop reason: SDUPTHER

## 2017-08-21 RX ORDER — ALPRAZOLAM 1 MG/1
1 TABLET ORAL 3 TIMES DAILY
Qty: 90 TABLET | Refills: 5 | Status: SHIPPED | OUTPATIENT
Start: 2017-08-21 | End: 2018-02-09 | Stop reason: SDUPTHER

## 2017-08-21 NOTE — PROGRESS NOTES
Subjective   Yuliana Kennedy is a 51 y.o. female    HPI Comments: Patient is here for recheck regarding chronic pain of the left knee following surgical removal of previous hardware approximately 6 weeks ago.  She is continuing with physical therapy.  She is also to follow-up on chronic anxiety.  She is due for refills on her opioid analgesics as well as her alprazolam.  Her Claudio is reviewed and is appropriate.  She has follow-up with her orthopedic surgeon at  in October.  She is uncertain whether total knee replacement is in the future or not.    Knee Pain      Anxiety   Symptoms include nervous/anxious behavior. Patient reports no dizziness or shortness of breath.           The following portions of the patient's history were reviewed and updated as appropriate: allergies, current medications, past social history and problem list    Review of Systems   Constitutional: Negative for chills, fatigue and fever.   HENT: Negative for congestion, ear pain, postnasal drip, rhinorrhea, sinus pressure and sore throat.    Eyes: Negative.    Respiratory: Negative for cough, shortness of breath and wheezing.    Cardiovascular: Negative.    Gastrointestinal: Negative.    Endocrine: Negative.    Genitourinary: Negative.    Musculoskeletal: Positive for arthralgias and gait problem.   Skin: Negative for rash and wound.   Allergic/Immunologic: Negative.    Neurological: Positive for weakness. Negative for dizziness and headaches.   Hematological: Negative.  Negative for adenopathy.   Psychiatric/Behavioral: The patient is nervous/anxious.    All other systems reviewed and are negative.      Objective     Vitals:    08/21/17 1440   BP: 142/80   Pulse: 75   Temp: 98.9 °F (37.2 °C)   SpO2: 98%       Physical Exam   Constitutional: She is oriented to person, place, and time. She appears well-developed and well-nourished.   HENT:   Head: Normocephalic and atraumatic.   Right Ear: Tympanic membrane, external ear and ear canal  normal.   Left Ear: Tympanic membrane, external ear and ear canal normal.   Nose: Nose normal.   Mouth/Throat: Oropharynx is clear and moist. No oropharyngeal exudate.   Eyes: Conjunctivae and EOM are normal. Pupils are equal, round, and reactive to light.   Neck: Normal range of motion. Neck supple. No thyromegaly present.   Cardiovascular: Normal rate, regular rhythm, normal heart sounds and intact distal pulses.    No murmur heard.  Pulmonary/Chest: Effort normal and breath sounds normal.   Abdominal: Soft. Bowel sounds are normal. She exhibits no mass. There is no tenderness.   Musculoskeletal: She exhibits no edema.        Left knee: She exhibits deformity. She exhibits no swelling and no effusion. Tenderness found.   Lymphadenopathy:     She has no cervical adenopathy.   Neurological: She is alert and oriented to person, place, and time. She has normal reflexes. No cranial nerve deficit.   Skin: Skin is warm and dry.   Psychiatric: She has a normal mood and affect. Her behavior is normal.   Nursing note and vitals reviewed.      Assessment/Plan   Problem List Items Addressed This Visit     None      Visit Diagnoses     Chronic pain of left knee        Relevant Medications    oxyCODONE-acetaminophen (PERCOCET)  MG per tablet    Anxiety        Relevant Medications    ALPRAZolam (XANAX) 1 MG tablet

## 2017-08-22 RX ORDER — SERTRALINE HYDROCHLORIDE 100 MG/1
TABLET, FILM COATED ORAL
Qty: 30 TABLET | Refills: 11 | Status: SHIPPED | OUTPATIENT
Start: 2017-08-22 | End: 2017-11-13

## 2017-09-19 ENCOUNTER — OFFICE VISIT (OUTPATIENT)
Dept: FAMILY MEDICINE CLINIC | Facility: CLINIC | Age: 52
End: 2017-09-19

## 2017-09-19 VITALS
SYSTOLIC BLOOD PRESSURE: 128 MMHG | TEMPERATURE: 98 F | HEIGHT: 66 IN | DIASTOLIC BLOOD PRESSURE: 68 MMHG | OXYGEN SATURATION: 98 % | HEART RATE: 88 BPM

## 2017-09-19 DIAGNOSIS — M25.562 CHRONIC PAIN OF LEFT KNEE: Primary | ICD-10-CM

## 2017-09-19 DIAGNOSIS — R11.2 NON-INTRACTABLE VOMITING WITH NAUSEA, UNSPECIFIED VOMITING TYPE: ICD-10-CM

## 2017-09-19 DIAGNOSIS — H66.93 BILATERAL OTITIS MEDIA, UNSPECIFIED CHRONICITY, UNSPECIFIED OTITIS MEDIA TYPE: ICD-10-CM

## 2017-09-19 DIAGNOSIS — G89.29 CHRONIC PAIN OF LEFT KNEE: Primary | ICD-10-CM

## 2017-09-19 PROCEDURE — 99214 OFFICE O/P EST MOD 30 MIN: CPT | Performed by: FAMILY MEDICINE

## 2017-09-19 RX ORDER — OXYCODONE AND ACETAMINOPHEN 10; 325 MG/1; MG/1
1 TABLET ORAL EVERY 6 HOURS PRN
Qty: 120 TABLET | Refills: 0 | Status: SHIPPED | OUTPATIENT
Start: 2017-09-19 | End: 2017-10-16 | Stop reason: SDUPTHER

## 2017-09-19 RX ORDER — PANTOPRAZOLE SODIUM 40 MG/1
TABLET, DELAYED RELEASE ORAL
Qty: 30 TABLET | Refills: 11 | Status: SHIPPED | OUTPATIENT
Start: 2017-09-19 | End: 2018-09-12

## 2017-09-19 RX ORDER — CEPHALEXIN 500 MG/1
500 CAPSULE ORAL 3 TIMES DAILY
Qty: 30 CAPSULE | Refills: 0 | Status: SHIPPED | OUTPATIENT
Start: 2017-09-19 | End: 2017-11-13

## 2017-09-19 NOTE — PROGRESS NOTES
Subjective   Yuliana Kennedy is a 51 y.o. female    HPI Comments: Patient is here for follow-up regarding her chronic left knee pain and refill of her oxycodone prescription.  Her Claudio is reviewed and is appropriate.  She continues doing physical therapy for her left knee and has a follow-up with her orthopedic surgeon at the [facility] in October.  She also complains today of ear pain, dizziness, nausea and vomiting.  This started today.  She denies any fever or chills, sinus pain or pressure or sore throat.    Knee Pain      Vomiting    Associated symptoms include arthralgias, chills and dizziness. Pertinent negatives include no coughing, fever or headaches.   Chills   Associated symptoms include arthralgias, chills, congestion, nausea, vomiting and weakness. Pertinent negatives include no coughing, fatigue, fever, headaches, rash or sore throat.   Dizziness   Associated symptoms include arthralgias, chills, congestion, nausea, vomiting and weakness. Pertinent negatives include no coughing, fatigue, fever, headaches, rash or sore throat.       The following portions of the patient's history were reviewed and updated as appropriate: allergies, current medications, past social history and problem list    Review of Systems   Constitutional: Positive for chills. Negative for fatigue and fever.   HENT: Positive for congestion, ear pain and sinus pressure. Negative for postnasal drip, rhinorrhea and sore throat.    Eyes: Negative.    Respiratory: Negative for cough, shortness of breath and wheezing.    Cardiovascular: Negative.    Gastrointestinal: Positive for nausea and vomiting.   Endocrine: Negative.    Genitourinary: Negative.    Musculoskeletal: Positive for arthralgias and gait problem.   Skin: Negative for rash and wound.   Allergic/Immunologic: Negative.    Neurological: Positive for dizziness and weakness. Negative for headaches.   Hematological: Negative.  Negative for adenopathy.    Psychiatric/Behavioral: The patient is nervous/anxious.    All other systems reviewed and are negative.      Objective     Vitals:    09/19/17 1454   BP: 128/68   Pulse: 88   Temp: 98 °F (36.7 °C)   SpO2: 98%       Physical Exam   Constitutional: She is oriented to person, place, and time. She appears well-developed and well-nourished.   HENT:   Head: Normocephalic and atraumatic.   Right Ear: Ear canal normal. Tympanic membrane is injected.   Left Ear: Ear canal normal. Tympanic membrane is injected.   Nose: Nose normal.   Mouth/Throat: Posterior oropharyngeal erythema present. No oropharyngeal exudate or posterior oropharyngeal edema.   Eyes: Conjunctivae and EOM are normal. Pupils are equal, round, and reactive to light.   Neck: Normal range of motion. Neck supple. No thyromegaly present.   Cardiovascular: Normal rate, regular rhythm, normal heart sounds and intact distal pulses.    No murmur heard.  Pulmonary/Chest: Effort normal and breath sounds normal.   Abdominal: Soft. Bowel sounds are normal. She exhibits no mass. There is no tenderness.   Musculoskeletal: She exhibits no edema.        Left knee: She exhibits deformity. She exhibits no swelling and no effusion. Tenderness found.   Lymphadenopathy:     She has no cervical adenopathy.   Neurological: She is alert and oriented to person, place, and time. She has normal reflexes. No cranial nerve deficit.   Skin: Skin is warm and dry.   Psychiatric: She has a normal mood and affect. Her behavior is normal.   Nursing note and vitals reviewed.      Assessment/Plan   Problem List Items Addressed This Visit     None      Visit Diagnoses     Chronic pain of left knee    -  Primary    Relevant Medications    oxyCODONE-acetaminophen (PERCOCET)  MG per tablet    Bilateral otitis media, unspecified chronicity, unspecified otitis media type        Relevant Medications    cephalexin (KEFLEX) 500 MG capsule    Non-intractable vomiting with nausea, unspecified  vomiting type

## 2017-10-16 ENCOUNTER — OFFICE VISIT (OUTPATIENT)
Dept: FAMILY MEDICINE CLINIC | Facility: CLINIC | Age: 52
End: 2017-10-16

## 2017-10-16 VITALS
OXYGEN SATURATION: 98 % | WEIGHT: 166 LBS | DIASTOLIC BLOOD PRESSURE: 68 MMHG | RESPIRATION RATE: 16 BRPM | HEIGHT: 66 IN | SYSTOLIC BLOOD PRESSURE: 120 MMHG | HEART RATE: 89 BPM | BODY MASS INDEX: 26.68 KG/M2

## 2017-10-16 DIAGNOSIS — G89.29 CHRONIC PAIN OF LEFT KNEE: Primary | ICD-10-CM

## 2017-10-16 DIAGNOSIS — M25.562 CHRONIC PAIN OF LEFT KNEE: Primary | ICD-10-CM

## 2017-10-16 DIAGNOSIS — Z98.890 S/P LEFT KNEE SURGERY: ICD-10-CM

## 2017-10-16 PROCEDURE — 99213 OFFICE O/P EST LOW 20 MIN: CPT | Performed by: FAMILY MEDICINE

## 2017-10-16 RX ORDER — OXYCODONE AND ACETAMINOPHEN 10; 325 MG/1; MG/1
1 TABLET ORAL EVERY 6 HOURS PRN
Qty: 120 TABLET | Refills: 0 | Status: SHIPPED | OUTPATIENT
Start: 2017-10-16 | End: 2017-11-13 | Stop reason: SDUPTHER

## 2017-10-16 NOTE — PROGRESS NOTES
Subjective   Yuliana Kennedy is a 52 y.o. female    HPI Comments: Patient presents today for recheck regarding her chronic left knee pain associated with previous tibial plateau fracture as well as subsequent surgery for hardware removal.  She is now seeing Dr. Watson at the [facility].  At her most recent visit this month he did x-rays which revealed significant posttraumatic arthritis.  She has improved since the hardware was removed.  Her gait and range of motion are significantly improved.  He recommended that she give the knee at least one year before consideration for total knee replacement unless her pain becomes much more severe.      The following portions of the patient's history were reviewed and updated as appropriate: allergies, current medications, past social history and problem list    Review of Systems   Constitutional: Negative for chills, fatigue and fever.   HENT: Negative for congestion, ear pain, postnasal drip, rhinorrhea, sinus pressure and sore throat.    Eyes: Negative.    Respiratory: Negative for cough, shortness of breath and wheezing.    Cardiovascular: Negative.    Gastrointestinal: Negative.    Endocrine: Negative.    Genitourinary: Negative.    Musculoskeletal: Positive for arthralgias and gait problem.   Skin: Negative for rash and wound.   Allergic/Immunologic: Negative.    Neurological: Positive for weakness. Negative for dizziness and headaches.   Hematological: Negative.  Negative for adenopathy.   Psychiatric/Behavioral: The patient is nervous/anxious.    All other systems reviewed and are negative.      Objective     Vitals:    10/16/17 1434   BP: 120/68   Pulse: 89   Resp: 16   SpO2: 98%       Physical Exam   Constitutional: She is oriented to person, place, and time. She appears well-developed and well-nourished.   HENT:   Right Ear: Tympanic membrane and ear canal normal.   Left Ear: Tympanic membrane and ear canal normal.   Mouth/Throat: No oropharyngeal exudate.    Neck: Normal range of motion. Neck supple. No thyromegaly present.   Cardiovascular: Normal rate and regular rhythm.    No murmur heard.  Pulmonary/Chest: Effort normal. She has decreased breath sounds.   Abdominal: Soft. Bowel sounds are normal. She exhibits no mass. There is no tenderness.   Musculoskeletal: She exhibits no edema.        Left knee: She exhibits deformity. She exhibits no swelling and no effusion. Tenderness found.   Lymphadenopathy:     She has no cervical adenopathy.   Neurological: She is alert and oriented to person, place, and time. She has normal reflexes. No cranial nerve deficit.   Skin: Skin is warm and dry.   Psychiatric: She has a normal mood and affect. Her behavior is normal.   Nursing note and vitals reviewed.      Assessment/Plan   Problem List Items Addressed This Visit     None      Visit Diagnoses     Chronic pain of left knee    -  Primary    Relevant Medications    oxyCODONE-acetaminophen (PERCOCET)  MG per tablet    S/P left knee surgery

## 2017-10-17 RX ORDER — FEXOFENADINE HYDROCHLORIDE 180 MG/1
TABLET, FILM COATED ORAL
Qty: 30 TABLET | Refills: 5 | Status: SHIPPED | OUTPATIENT
Start: 2017-10-17 | End: 2018-03-30 | Stop reason: SDUPTHER

## 2017-11-13 ENCOUNTER — OFFICE VISIT (OUTPATIENT)
Dept: FAMILY MEDICINE CLINIC | Facility: CLINIC | Age: 52
End: 2017-11-13

## 2017-11-13 VITALS
BODY MASS INDEX: 27.03 KG/M2 | HEIGHT: 66 IN | SYSTOLIC BLOOD PRESSURE: 118 MMHG | HEART RATE: 84 BPM | DIASTOLIC BLOOD PRESSURE: 80 MMHG | OXYGEN SATURATION: 100 % | WEIGHT: 168.2 LBS

## 2017-11-13 DIAGNOSIS — J40 BRONCHITIS: ICD-10-CM

## 2017-11-13 DIAGNOSIS — Z98.890 S/P LEFT KNEE SURGERY: ICD-10-CM

## 2017-11-13 DIAGNOSIS — G89.29 CHRONIC PAIN OF LEFT KNEE: Primary | ICD-10-CM

## 2017-11-13 DIAGNOSIS — S82.142D TIBIAL PLATEAU FRACTURE, LEFT, CLOSED, WITH ROUTINE HEALING, SUBSEQUENT ENCOUNTER: ICD-10-CM

## 2017-11-13 DIAGNOSIS — J01.01 ACUTE RECURRENT MAXILLARY SINUSITIS: ICD-10-CM

## 2017-11-13 DIAGNOSIS — F32.A DEPRESSION, UNSPECIFIED DEPRESSION TYPE: ICD-10-CM

## 2017-11-13 DIAGNOSIS — Z72.0 TOBACCO USE: ICD-10-CM

## 2017-11-13 DIAGNOSIS — M25.562 CHRONIC PAIN OF LEFT KNEE: Primary | ICD-10-CM

## 2017-11-13 PROCEDURE — 99214 OFFICE O/P EST MOD 30 MIN: CPT | Performed by: FAMILY MEDICINE

## 2017-11-13 RX ORDER — AMOXICILLIN AND CLAVULANATE POTASSIUM 875; 125 MG/1; MG/1
1 TABLET, FILM COATED ORAL 2 TIMES DAILY
Qty: 20 TABLET | Refills: 0 | Status: SHIPPED | OUTPATIENT
Start: 2017-11-13 | End: 2017-11-23

## 2017-11-13 RX ORDER — DULOXETIN HYDROCHLORIDE 30 MG/1
30 CAPSULE, DELAYED RELEASE ORAL DAILY
Qty: 30 CAPSULE | Refills: 5 | Status: SHIPPED | OUTPATIENT
Start: 2017-11-13 | End: 2017-12-08

## 2017-11-13 RX ORDER — OXYCODONE AND ACETAMINOPHEN 10; 325 MG/1; MG/1
1 TABLET ORAL EVERY 6 HOURS PRN
Qty: 120 TABLET | Refills: 0 | Status: SHIPPED | OUTPATIENT
Start: 2017-11-13 | End: 2017-12-08 | Stop reason: SDUPTHER

## 2017-11-13 NOTE — PROGRESS NOTES
Subjective   Yuliana Kennedy is a 52 y.o. female    HPI Comments: Patient returns for recheck regarding chronic left knee pain.  Since last seen she injured her left ankle and had to stop doing physical therapy for her left knee.  X-rays of her ankle were negative and she was told that she had sprained her ankle.  She requests a new physical therapy referral order so she can continue therapy for her left knee pain.  Her ambulation is improved but she is unable to ambulate very far and cannot stand for very long period of time due to the discomfort in her knee.  Her initial injury was a tibial plateau fracture for which she had surgery. She never recovered very well after the initial injury.  Subsequent referral to the [facility] led to another surgery including removal of the hardware in her left knee.  She is told that she may come to total knee replacement by her [facility] orthopedic surgeon.    Also complains today of productive cough, sinus congestion, postnasal drainage, sore throat and, ear pain.  She continues to smoke cigarettes.      Chronic anxiety.  Stable on current medication.  Claudio is reviewed and is appropriate regarding her controlled substances.      Depression.  Patient doesn't feel that her current antidepressant, sertraline, is helping and would like to try something different.      Depression Patient presents with the following symptoms: nervousness/anxiety.  Patient is not experiencing: confusion, decreased concentration, shortness of breath and suicidal ideas.    Hypertension   Pertinent negatives include no chest pain, headaches or shortness of breath.       The following portions of the patient's history were reviewed and updated as appropriate: allergies, current medications, past social history and problem list    Review of Systems   Constitutional: Negative for appetite change, chills, fatigue and fever.   HENT: Negative.  Negative for congestion, ear pain, postnasal  drip, rhinorrhea, sinus pressure and sore throat.    Eyes: Negative.    Respiratory: Positive for chest tightness. Negative for cough, shortness of breath and wheezing.    Cardiovascular: Negative.  Negative for chest pain.   Gastrointestinal: Negative.  Negative for abdominal pain, diarrhea and nausea.   Endocrine: Negative.    Genitourinary: Negative.    Musculoskeletal: Positive for arthralgias and gait problem.   Skin: Negative for rash and wound.   Allergic/Immunologic: Negative.    Neurological: Positive for weakness. Negative for dizziness, tremors, light-headedness and headaches.   Hematological: Negative.  Negative for adenopathy.   Psychiatric/Behavioral: Positive for dysphoric mood and sleep disturbance. Negative for agitation, behavioral problems, confusion, decreased concentration and suicidal ideas. The patient is nervous/anxious.    All other systems reviewed and are negative.      Objective     Vitals:    11/13/17 1458   BP: 118/80   Pulse: 84   SpO2: 100%       Physical Exam   Constitutional: She is oriented to person, place, and time. She appears well-developed and well-nourished. No distress.   HENT:   Head: Normocephalic and atraumatic.   Right Ear: Tympanic membrane and ear canal normal.   Left Ear: Tympanic membrane and ear canal normal.   Nose: Mucosal edema, rhinorrhea and sinus tenderness present. Right sinus exhibits maxillary sinus tenderness and frontal sinus tenderness. Left sinus exhibits maxillary sinus tenderness and frontal sinus tenderness.   Mouth/Throat: Oropharynx is clear and moist. No oropharyngeal exudate.   Eyes: Pupils are equal, round, and reactive to light.   Neck: Normal range of motion. Neck supple. No JVD present. No thyromegaly present.   Cardiovascular: Normal rate, regular rhythm and normal heart sounds.    No murmur heard.  Pulmonary/Chest: Effort normal. No stridor. No respiratory distress. She has decreased breath sounds. She has wheezes.   Abdominal: Soft. Bowel  sounds are normal. She exhibits no mass. There is no tenderness.   Musculoskeletal: She exhibits no edema.        Left knee: She exhibits deformity. She exhibits no swelling and no effusion. Tenderness found.   Lymphadenopathy:     She has no cervical adenopathy.   Neurological: She is alert and oriented to person, place, and time. She has normal reflexes. No cranial nerve deficit.   Skin: Skin is warm and dry. She is not diaphoretic.   Psychiatric: She has a normal mood and affect. Her behavior is normal.   Nursing note and vitals reviewed.      Assessment/Plan   Problem List Items Addressed This Visit        Other    Tobacco use      Other Visit Diagnoses     Chronic pain of left knee    -  Primary    Relevant Medications    oxyCODONE-acetaminophen (PERCOCET)  MG per tablet    S/P left knee surgery        Tibial plateau fracture, left, closed, with routine healing, subsequent encounter        Acute recurrent maxillary sinusitis        Relevant Medications    amoxicillin-clavulanate (AUGMENTIN) 875-125 MG per tablet    Bronchitis        Relevant Medications    amoxicillin-clavulanate (AUGMENTIN) 875-125 MG per tablet    Depression, unspecified depression type        Relevant Medications    DULoxetine (CYMBALTA) 30 MG capsule

## 2017-12-08 ENCOUNTER — OFFICE VISIT (OUTPATIENT)
Dept: FAMILY MEDICINE CLINIC | Facility: CLINIC | Age: 52
End: 2017-12-08

## 2017-12-08 VITALS
BODY MASS INDEX: 27 KG/M2 | HEART RATE: 82 BPM | DIASTOLIC BLOOD PRESSURE: 70 MMHG | WEIGHT: 168 LBS | HEIGHT: 66 IN | OXYGEN SATURATION: 99 % | SYSTOLIC BLOOD PRESSURE: 122 MMHG

## 2017-12-08 DIAGNOSIS — F32.A DEPRESSION, UNSPECIFIED DEPRESSION TYPE: ICD-10-CM

## 2017-12-08 DIAGNOSIS — M25.562 CHRONIC PAIN OF LEFT KNEE: Primary | ICD-10-CM

## 2017-12-08 DIAGNOSIS — G89.29 CHRONIC PAIN OF LEFT KNEE: Primary | ICD-10-CM

## 2017-12-08 PROCEDURE — 99213 OFFICE O/P EST LOW 20 MIN: CPT | Performed by: FAMILY MEDICINE

## 2017-12-08 RX ORDER — OXYCODONE AND ACETAMINOPHEN 10; 325 MG/1; MG/1
1 TABLET ORAL EVERY 6 HOURS PRN
Qty: 120 TABLET | Refills: 0 | Status: SHIPPED | OUTPATIENT
Start: 2017-12-08 | End: 2018-01-05 | Stop reason: SDUPTHER

## 2017-12-08 RX ORDER — DULOXETIN HYDROCHLORIDE 60 MG/1
60 CAPSULE, DELAYED RELEASE ORAL DAILY
Qty: 30 CAPSULE | Refills: 5 | Status: SHIPPED | OUTPATIENT
Start: 2017-12-08 | End: 2018-05-25 | Stop reason: SDUPTHER

## 2017-12-08 NOTE — PROGRESS NOTES
Subjective   Yuliana Kennedy is a 52 y.o. female    HPI Comments: Patient presents for follow-up regarding chronic pain of her left knee following fracture and 2 surgeries.  Her current pain is controlled with her opioid therapy.  Claudio is reviewed and is appropriate.  We discussed starting to wean this medication after the first of the year.  Also following up on depression.  We changed her antidepressant from sertraline to duloxetine at her last visit.  She feels the medication has been quite beneficial and is pleased with the results.  We discussed increasing her dose to 60 mg to see if that will help further.    Knee Pain          The following portions of the patient's history were reviewed and updated as appropriate: allergies, current medications, past social history and problem list    Review of Systems   Constitutional: Negative for appetite change, chills, fatigue and fever.   HENT: Negative.  Negative for congestion, ear pain, postnasal drip, rhinorrhea, sinus pressure and sore throat.    Eyes: Negative.    Respiratory: Negative for cough, chest tightness, shortness of breath and wheezing.    Cardiovascular: Negative.  Negative for chest pain.   Gastrointestinal: Negative.  Negative for abdominal pain, diarrhea and nausea.   Endocrine: Negative.    Genitourinary: Negative.    Musculoskeletal: Positive for arthralgias and gait problem.   Skin: Negative for rash and wound.   Allergic/Immunologic: Negative.    Neurological: Negative for dizziness, tremors, weakness, light-headedness and headaches.   Hematological: Negative.  Negative for adenopathy.   Psychiatric/Behavioral: Positive for dysphoric mood. Negative for agitation, behavioral problems, confusion, decreased concentration, sleep disturbance and suicidal ideas. The patient is not nervous/anxious.    All other systems reviewed and are negative.      Objective     Vitals:    12/08/17 1525   BP: 122/70   Pulse: 82   SpO2: 99%       Physical Exam    Constitutional: She is oriented to person, place, and time. She appears well-developed and well-nourished. No distress.   HENT:   Head: Normocephalic and atraumatic.   Right Ear: Tympanic membrane and ear canal normal.   Left Ear: Tympanic membrane and ear canal normal.   Mouth/Throat: Oropharynx is clear and moist. No oropharyngeal exudate.   Eyes: Pupils are equal, round, and reactive to light.   Neck: Normal range of motion. Neck supple. No JVD present. No thyromegaly present.   Cardiovascular: Normal rate, regular rhythm and normal heart sounds.    No murmur heard.  Pulmonary/Chest: Effort normal. No stridor. No respiratory distress. She has decreased breath sounds. She has wheezes.   Abdominal: Soft. Bowel sounds are normal. She exhibits no mass. There is no tenderness.   Musculoskeletal: She exhibits no edema.        Left knee: She exhibits deformity. She exhibits no swelling and no effusion. Tenderness found.   Lymphadenopathy:     She has no cervical adenopathy.   Neurological: She is alert and oriented to person, place, and time. She has normal reflexes. No cranial nerve deficit.   Skin: Skin is warm and dry. She is not diaphoretic.   Psychiatric: She has a normal mood and affect. Her behavior is normal.   Nursing note and vitals reviewed.      Assessment/Plan   Problem List Items Addressed This Visit     None      Visit Diagnoses     Chronic pain of left knee    -  Primary    Relevant Medications    oxyCODONE-acetaminophen (PERCOCET)  MG per tablet    Depression, unspecified depression type        Relevant Medications    DULoxetine (CYMBALTA) 60 MG capsule        Recheck in one month.

## 2018-01-05 ENCOUNTER — OFFICE VISIT (OUTPATIENT)
Dept: FAMILY MEDICINE CLINIC | Facility: CLINIC | Age: 53
End: 2018-01-05

## 2018-01-05 VITALS
BODY MASS INDEX: 27 KG/M2 | SYSTOLIC BLOOD PRESSURE: 122 MMHG | TEMPERATURE: 97.4 F | WEIGHT: 168 LBS | HEART RATE: 79 BPM | HEIGHT: 66 IN | DIASTOLIC BLOOD PRESSURE: 78 MMHG | OXYGEN SATURATION: 99 %

## 2018-01-05 DIAGNOSIS — G89.29 CHRONIC PAIN OF LEFT KNEE: ICD-10-CM

## 2018-01-05 DIAGNOSIS — M25.562 CHRONIC PAIN OF LEFT KNEE: ICD-10-CM

## 2018-01-05 PROCEDURE — 99213 OFFICE O/P EST LOW 20 MIN: CPT | Performed by: FAMILY MEDICINE

## 2018-01-05 RX ORDER — SERTRALINE HYDROCHLORIDE 100 MG/1
TABLET, FILM COATED ORAL
Refills: 11 | COMMUNITY
Start: 2017-12-11 | End: 2018-08-01

## 2018-01-05 RX ORDER — OXYCODONE AND ACETAMINOPHEN 10; 325 MG/1; MG/1
1 TABLET ORAL EVERY 6 HOURS PRN
Qty: 105 TABLET | Refills: 0 | Status: SHIPPED | OUTPATIENT
Start: 2018-01-05 | End: 2018-02-05 | Stop reason: SDUPTHER

## 2018-01-05 NOTE — PROGRESS NOTES
Subjective   Yuliana Kennedy is a 52 y.o. female    HPI Comments: Patient presents for follow-up regarding her chronic knee pain secondary to previous fracture and subsequent surgeries.  She is dependent on oxycodone.  Her Claudio is reviewed and is appropriate.  She is agreeable to try to taper her dose somewhat.  I recommended we do this quite slowly to avoid withdrawal symptoms.  We will decrease her dose by 5 mg a month. She is having no acute new medical problems today.    Knee Pain          The following portions of the patient's history were reviewed and updated as appropriate: allergies, current medications, past social history and problem list    Review of Systems   Constitutional: Negative for appetite change, chills, fatigue and fever.   HENT: Negative.  Negative for congestion, ear pain, postnasal drip, rhinorrhea, sinus pressure and sore throat.    Eyes: Negative.    Respiratory: Negative for cough, chest tightness, shortness of breath and wheezing.    Cardiovascular: Negative.  Negative for chest pain.   Gastrointestinal: Negative.  Negative for abdominal pain, diarrhea and nausea.   Endocrine: Negative.    Genitourinary: Negative.    Musculoskeletal: Positive for arthralgias and gait problem.   Skin: Negative for rash and wound.   Allergic/Immunologic: Negative.    Neurological: Negative for dizziness, tremors, weakness, light-headedness and headaches.   Hematological: Negative.  Negative for adenopathy.   Psychiatric/Behavioral: Negative for agitation, behavioral problems, confusion, decreased concentration, dysphoric mood, sleep disturbance and suicidal ideas. The patient is not nervous/anxious.    All other systems reviewed and are negative.      Objective     Vitals:    01/05/18 1435   BP: 122/78   Pulse: 79   Temp: 97.4 °F (36.3 °C)   SpO2: 99%       Physical Exam   Constitutional: She is oriented to person, place, and time. She appears well-developed and well-nourished. No distress.   HENT:    Head: Normocephalic and atraumatic.   Right Ear: Tympanic membrane and ear canal normal.   Left Ear: Tympanic membrane and ear canal normal.   Mouth/Throat: Oropharynx is clear and moist. No oropharyngeal exudate.   Cardiovascular: Normal rate, regular rhythm and normal heart sounds.    No murmur heard.  Pulmonary/Chest: Effort normal. No respiratory distress. She has decreased breath sounds. She has no wheezes.   Abdominal: Soft. Bowel sounds are normal. She exhibits no mass. There is no tenderness.   Musculoskeletal: She exhibits no edema.        Left knee: She exhibits deformity. She exhibits no swelling and no effusion. Tenderness found.   Neurological: She is alert and oriented to person, place, and time. She has normal reflexes. No cranial nerve deficit.   Skin: Skin is warm and dry. She is not diaphoretic.   Psychiatric: She has a normal mood and affect. Her behavior is normal.   Nursing note and vitals reviewed.      Assessment/Plan   Problem List Items Addressed This Visit     None      Visit Diagnoses     Chronic pain of left knee        Relevant Medications    oxyCODONE-acetaminophen (PERCOCET)  MG per tablet

## 2018-02-05 ENCOUNTER — OFFICE VISIT (OUTPATIENT)
Dept: FAMILY MEDICINE CLINIC | Facility: CLINIC | Age: 53
End: 2018-02-05

## 2018-02-05 VITALS
SYSTOLIC BLOOD PRESSURE: 126 MMHG | DIASTOLIC BLOOD PRESSURE: 82 MMHG | TEMPERATURE: 98.5 F | HEART RATE: 67 BPM | WEIGHT: 167.4 LBS | HEIGHT: 66 IN | OXYGEN SATURATION: 98 % | BODY MASS INDEX: 26.9 KG/M2

## 2018-02-05 DIAGNOSIS — M25.562 CHRONIC PAIN OF LEFT KNEE: Primary | ICD-10-CM

## 2018-02-05 DIAGNOSIS — H66.90 ACUTE OTITIS MEDIA, UNSPECIFIED OTITIS MEDIA TYPE: ICD-10-CM

## 2018-02-05 DIAGNOSIS — F41.9 ANXIETY: ICD-10-CM

## 2018-02-05 DIAGNOSIS — R11.2 NAUSEA AND VOMITING, INTRACTABILITY OF VOMITING NOT SPECIFIED, UNSPECIFIED VOMITING TYPE: ICD-10-CM

## 2018-02-05 DIAGNOSIS — G89.29 CHRONIC PAIN OF LEFT KNEE: Primary | ICD-10-CM

## 2018-02-05 LAB
EXPIRATION DATE: NORMAL
FLUAV AG NPH QL: NEGATIVE
FLUBV AG NPH QL: NEGATIVE
INTERNAL CONTROL: NORMAL
Lab: NORMAL

## 2018-02-05 PROCEDURE — 99214 OFFICE O/P EST MOD 30 MIN: CPT | Performed by: FAMILY MEDICINE

## 2018-02-05 PROCEDURE — 87804 INFLUENZA ASSAY W/OPTIC: CPT | Performed by: FAMILY MEDICINE

## 2018-02-05 PROCEDURE — 96372 THER/PROPH/DIAG INJ SC/IM: CPT | Performed by: FAMILY MEDICINE

## 2018-02-05 RX ORDER — OXYCODONE AND ACETAMINOPHEN 10; 325 MG/1; MG/1
1 TABLET ORAL EVERY 6 HOURS PRN
Qty: 90 TABLET | Refills: 0 | Status: SHIPPED | OUTPATIENT
Start: 2018-02-05 | End: 2018-03-02 | Stop reason: SDUPTHER

## 2018-02-05 RX ORDER — CEPHALEXIN 500 MG/1
500 CAPSULE ORAL 3 TIMES DAILY
Qty: 30 CAPSULE | Refills: 0 | Status: SHIPPED | OUTPATIENT
Start: 2018-02-05 | End: 2018-03-29

## 2018-02-05 RX ORDER — PROMETHAZINE HYDROCHLORIDE 25 MG/1
25 TABLET ORAL ONCE
Status: DISCONTINUED | OUTPATIENT
Start: 2018-02-05 | End: 2018-02-05

## 2018-02-05 RX ORDER — PROMETHAZINE HYDROCHLORIDE 25 MG/ML
25 INJECTION, SOLUTION INTRAMUSCULAR; INTRAVENOUS ONCE
Status: COMPLETED | OUTPATIENT
Start: 2018-02-05 | End: 2018-02-05

## 2018-02-05 RX ADMIN — PROMETHAZINE HYDROCHLORIDE 25 MG: 25 INJECTION, SOLUTION INTRAMUSCULAR; INTRAVENOUS at 15:24

## 2018-02-05 NOTE — PROGRESS NOTES
Subjective   Yuliana Kennedy is a 52 y.o. female    HPI Comments: Patient is here today for follow-up regarding chronic left knee pain.  She tolerated the decrease in her oxycodone without any adverse effects.  She is agreeable to decrease by another 5 mg per day for the next 30 days so we will go down from 105 tablets to 90 tablets for the next month.  She is also complaining today of bilateral earache, chills, arthralgias, nausea with vomiting and is worried that she has the flu.  She has not had any fever and denies sore throat or cough.  Her flu swab was negative    Earache    Associated symptoms include vomiting. Pertinent negatives include no abdominal pain, coughing, diarrhea, headaches, rash, rhinorrhea or sore throat.       The following portions of the patient's history were reviewed and updated as appropriate: allergies, current medications, past social history and problem list    Review of Systems   Constitutional: Negative for appetite change, chills, fatigue and fever.   HENT: Positive for ear pain. Negative for congestion, postnasal drip, rhinorrhea, sinus pressure and sore throat.    Eyes: Negative.    Respiratory: Negative for cough, chest tightness, shortness of breath and wheezing.    Cardiovascular: Negative.  Negative for chest pain.   Gastrointestinal: Positive for nausea and vomiting. Negative for abdominal pain and diarrhea.   Endocrine: Negative.    Genitourinary: Negative.    Musculoskeletal: Positive for arthralgias and gait problem.   Skin: Negative for rash and wound.   Allergic/Immunologic: Negative.    Neurological: Negative for dizziness, tremors, weakness, light-headedness and headaches.   Hematological: Negative.  Negative for adenopathy.   Psychiatric/Behavioral: Negative for agitation, behavioral problems, confusion, decreased concentration, dysphoric mood, sleep disturbance and suicidal ideas. The patient is not nervous/anxious.    All other systems reviewed and are  negative.      Objective     Vitals:    02/05/18 1453   BP: 126/82   Pulse: 67   Temp: 98.5 °F (36.9 °C)   SpO2: 98%       Physical Exam   Constitutional: She is oriented to person, place, and time. She appears well-developed and well-nourished. No distress.   HENT:   Head: Normocephalic and atraumatic.   Right Ear: Ear canal normal. Tympanic membrane is injected.   Left Ear: Ear canal normal. Tympanic membrane is injected.   Mouth/Throat: Oropharynx is clear and moist. No oropharyngeal exudate.   Cardiovascular: Normal rate, regular rhythm and normal heart sounds.    No murmur heard.  Pulmonary/Chest: Effort normal. No respiratory distress. She has decreased breath sounds. She has no wheezes.   Abdominal: Soft. Bowel sounds are normal. She exhibits no mass. There is no tenderness.   Musculoskeletal: She exhibits no edema.        Left knee: She exhibits deformity. She exhibits no swelling and no effusion. Tenderness found.   Neurological: She is alert and oriented to person, place, and time. She has normal reflexes. No cranial nerve deficit.   Skin: Skin is warm and dry. She is not diaphoretic.   Psychiatric: She has a normal mood and affect. Her behavior is normal.   Nursing note and vitals reviewed.      Assessment/Plan   Problem List Items Addressed This Visit     None      Visit Diagnoses     Chronic pain of left knee    -  Primary    Relevant Medications    oxyCODONE-acetaminophen (PERCOCET)  MG per tablet    Nausea and vomiting, intractability of vomiting not specified, unspecified vomiting type        Relevant Medications    promethazine (PHENERGAN) injection 25 mg (Completed)    Other Relevant Orders    POC Influenza A / B (Completed)    Acute otitis media, unspecified otitis media type        Anxiety

## 2018-02-09 ENCOUNTER — TELEPHONE (OUTPATIENT)
Dept: FAMILY MEDICINE CLINIC | Facility: CLINIC | Age: 53
End: 2018-02-09

## 2018-02-09 DIAGNOSIS — F41.9 ANXIETY: ICD-10-CM

## 2018-02-09 RX ORDER — ALPRAZOLAM 1 MG/1
1 TABLET ORAL 3 TIMES DAILY
Qty: 90 TABLET | Refills: 2 | OUTPATIENT
Start: 2018-02-09 | End: 2018-03-29 | Stop reason: SDUPTHER

## 2018-02-09 NOTE — TELEPHONE ENCOUNTER
----- Message from Zahraa King sent at 2/9/2018 10:34 AM EST -----  Contact: PATIENT  PT CALLED TO SEE IF REFILL FOR XANAX CAN BE CALLED IN, SHE WAS INFORMED BY PHARMACY THAT HER REFILLS ARE OUT. USES [facility] IN [facility]. PT STATES SHE IS OUT OF THE MEDS AND NEEDS YOU TO CALL HER WHEN YOU CALL IT .[phone]; WOULD GREATLY APPRECIATE IT

## 2018-02-09 NOTE — TELEPHONE ENCOUNTER
----- Message from Natalie Bridges sent at 2/9/2018  1:19 PM EST -----  Contact: PT.  PT. SEE'S DR. MORALES.  PT. IS NEEDING REFILLS ON:    Disp Refills Start End        ALPRAZolam (XANAX) 1 MG tablet 90 tablet 5 8/21/2017      Sig - Route: Take 1 tablet by mouth 3 (Three) Times a Day. - Oral     PT. IS CURRENTLY OUT OF [facility].  RX=[address] - [address] [facility] - [phone]  - [phone]  [phone] (Phone)  [phone] (Fax)    PT. CAN BE REACHED @ [facility].

## 2018-03-02 ENCOUNTER — OFFICE VISIT (OUTPATIENT)
Dept: FAMILY MEDICINE CLINIC | Facility: CLINIC | Age: 53
End: 2018-03-02

## 2018-03-02 VITALS
TEMPERATURE: 99.4 F | WEIGHT: 169 LBS | SYSTOLIC BLOOD PRESSURE: 118 MMHG | BODY MASS INDEX: 27.16 KG/M2 | OXYGEN SATURATION: 99 % | HEART RATE: 82 BPM | HEIGHT: 66 IN | DIASTOLIC BLOOD PRESSURE: 60 MMHG

## 2018-03-02 DIAGNOSIS — F41.9 ANXIETY: ICD-10-CM

## 2018-03-02 DIAGNOSIS — M25.562 CHRONIC PAIN OF LEFT KNEE: Primary | ICD-10-CM

## 2018-03-02 DIAGNOSIS — G89.29 CHRONIC PAIN OF LEFT KNEE: Primary | ICD-10-CM

## 2018-03-02 PROCEDURE — 99213 OFFICE O/P EST LOW 20 MIN: CPT | Performed by: FAMILY MEDICINE

## 2018-03-02 RX ORDER — OXYCODONE AND ACETAMINOPHEN 10; 325 MG/1; MG/1
1 TABLET ORAL EVERY 6 HOURS PRN
Qty: 75 TABLET | Refills: 0 | Status: SHIPPED | OUTPATIENT
Start: 2018-03-02 | End: 2018-03-29 | Stop reason: SDUPTHER

## 2018-03-02 NOTE — PROGRESS NOTES
Subjective   Yuliana Kennedy is a 52 y.o. female    HPI Comments: Patient presents for recheck regarding chronic left knee pain after previous fracture and subsequent surgeries.  She continues to wean off her oxycodone.  She is down to 3 tablets per day.  We will go down today to 2-1/2 tablets a day.  She reports little if any withdrawal symptoms with this tapering.  Her Claudio is reviewed and is appropriate.  Overall she appears to be doing quite well.    Knee Pain          The following portions of the patient's history were reviewed and updated as appropriate: allergies, current medications, past social history and problem list    Review of Systems   Constitutional: Negative for appetite change, chills, fatigue and fever.   HENT: Negative.  Negative for congestion, ear pain, postnasal drip, rhinorrhea, sinus pressure and sore throat.    Eyes: Negative.    Respiratory: Negative for cough, chest tightness, shortness of breath and wheezing.    Cardiovascular: Negative.  Negative for chest pain.   Gastrointestinal: Negative.  Negative for abdominal pain, diarrhea and nausea.   Endocrine: Negative.    Genitourinary: Negative.    Musculoskeletal: Positive for arthralgias and gait problem.   Skin: Negative for rash and wound.   Allergic/Immunologic: Negative.    Neurological: Negative for dizziness, tremors, weakness, light-headedness and headaches.   Hematological: Negative.  Negative for adenopathy.   Psychiatric/Behavioral: Positive for dysphoric mood. Negative for agitation, behavioral problems, confusion, decreased concentration, sleep disturbance and suicidal ideas. The patient is not nervous/anxious.    All other systems reviewed and are negative.      Objective     Vitals:    03/02/18 1455   BP: 118/60   Pulse: 82   Temp: 99.4 °F (37.4 °C)   SpO2: 99%       Physical Exam   Constitutional: She is oriented to person, place, and time. She appears well-developed and well-nourished. No distress.   HENT:   Head:  Normocephalic and atraumatic.   Right Ear: Tympanic membrane and ear canal normal.   Left Ear: Tympanic membrane and ear canal normal.   Mouth/Throat: Oropharynx is clear and moist. No oropharyngeal exudate.   Eyes: Pupils are equal, round, and reactive to light.   Neck: Normal range of motion. Neck supple. No JVD present. No thyromegaly present.   Cardiovascular: Normal rate, regular rhythm and normal heart sounds.    No murmur heard.  Pulmonary/Chest: Effort normal. No stridor. No respiratory distress. She has decreased breath sounds. She has no wheezes.   Abdominal: Soft. Bowel sounds are normal. She exhibits no mass. There is no tenderness.   Musculoskeletal: She exhibits no edema.        Left knee: She exhibits deformity. She exhibits no swelling and no effusion. Tenderness found.   Lymphadenopathy:     She has no cervical adenopathy.   Neurological: She is alert and oriented to person, place, and time. She has normal reflexes. No cranial nerve deficit.   Skin: Skin is warm and dry. She is not diaphoretic.   Psychiatric: She has a normal mood and affect. Her behavior is normal.   Nursing note and vitals reviewed.      Assessment/Plan   Problem List Items Addressed This Visit     None      Visit Diagnoses     Chronic pain of left knee    -  Primary    Relevant Medications    oxyCODONE-acetaminophen (PERCOCET)  MG per tablet    Anxiety

## 2018-03-29 ENCOUNTER — OFFICE VISIT (OUTPATIENT)
Dept: FAMILY MEDICINE CLINIC | Facility: CLINIC | Age: 53
End: 2018-03-29

## 2018-03-29 VITALS
HEART RATE: 88 BPM | WEIGHT: 164 LBS | HEIGHT: 66 IN | BODY MASS INDEX: 26.36 KG/M2 | DIASTOLIC BLOOD PRESSURE: 64 MMHG | SYSTOLIC BLOOD PRESSURE: 120 MMHG

## 2018-03-29 DIAGNOSIS — F41.9 ANXIETY: ICD-10-CM

## 2018-03-29 DIAGNOSIS — M25.562 CHRONIC PAIN OF LEFT KNEE: Primary | ICD-10-CM

## 2018-03-29 DIAGNOSIS — J01.01 ACUTE RECURRENT MAXILLARY SINUSITIS: ICD-10-CM

## 2018-03-29 DIAGNOSIS — G89.29 CHRONIC PAIN OF LEFT KNEE: Primary | ICD-10-CM

## 2018-03-29 PROCEDURE — 99213 OFFICE O/P EST LOW 20 MIN: CPT | Performed by: FAMILY MEDICINE

## 2018-03-29 RX ORDER — OXYCODONE AND ACETAMINOPHEN 10; 325 MG/1; MG/1
1 TABLET ORAL EVERY 6 HOURS PRN
Qty: 60 TABLET | Refills: 0 | Status: SHIPPED | OUTPATIENT
Start: 2018-03-29 | End: 2018-03-29 | Stop reason: SDUPTHER

## 2018-03-29 RX ORDER — OXYCODONE AND ACETAMINOPHEN 10; 325 MG/1; MG/1
1 TABLET ORAL EVERY 6 HOURS PRN
Qty: 120 TABLET | Refills: 0 | Status: SHIPPED | OUTPATIENT
Start: 2018-03-29 | End: 2018-05-08 | Stop reason: SDUPTHER

## 2018-03-29 RX ORDER — ALPRAZOLAM 1 MG/1
1 TABLET ORAL 3 TIMES DAILY
Qty: 90 TABLET | Refills: 2 | Status: SHIPPED | OUTPATIENT
Start: 2018-03-29 | End: 2018-06-19 | Stop reason: SDUPTHER

## 2018-03-29 RX ORDER — AMOXICILLIN AND CLAVULANATE POTASSIUM 875; 125 MG/1; MG/1
1 TABLET, FILM COATED ORAL 2 TIMES DAILY
Qty: 20 TABLET | Refills: 1 | Status: SHIPPED | OUTPATIENT
Start: 2018-03-29 | End: 2018-04-08

## 2018-03-29 NOTE — PROGRESS NOTES
Subjective   Yuliana Kennedy is a 52 y.o. female    Here for recheck regarding chronic pain of the left knee with previous fracture.  Has been weaning down on oxycodone.  Is ready to drop down to 2 tablets a day.  Needs refill on her alprazolam for chronic anxiety.  Would like a prescription to have on hand for recurring sinusitis/otitis.      Knee Pain      Otitis Media   Associated symptoms include arthralgias, nausea and vomiting. Pertinent negatives include no abdominal pain, chest pain, chills, congestion, coughing, fatigue, fever, headaches, rash, sore throat or weakness.       The following portions of the patient's history were reviewed and updated as appropriate: allergies, current medications, past social history and problem list    Review of Systems   Constitutional: Negative for appetite change, chills, fatigue and fever.   HENT: Positive for ear pain. Negative for congestion, postnasal drip, rhinorrhea, sinus pressure and sore throat.    Eyes: Negative.    Respiratory: Negative for cough, chest tightness, shortness of breath and wheezing.    Cardiovascular: Negative.  Negative for chest pain.   Gastrointestinal: Positive for nausea and vomiting. Negative for abdominal pain and diarrhea.   Endocrine: Negative.    Genitourinary: Negative.    Musculoskeletal: Positive for arthralgias and gait problem.   Skin: Negative for rash and wound.   Allergic/Immunologic: Negative.    Neurological: Negative for dizziness, tremors, weakness, light-headedness and headaches.   Hematological: Negative.  Negative for adenopathy.   Psychiatric/Behavioral: Negative for agitation, behavioral problems, confusion, decreased concentration, dysphoric mood, sleep disturbance and suicidal ideas. The patient is not nervous/anxious.    All other systems reviewed and are negative.      Objective     Vitals:    03/29/18 1519   BP: 120/64   Pulse: 88       Physical Exam   Constitutional: She is oriented to person, place, and time. She  appears well-developed and well-nourished. No distress.   HENT:   Head: Normocephalic and atraumatic.   Right Ear: Ear canal normal. Tympanic membrane is injected.   Left Ear: Ear canal normal. Tympanic membrane is injected.   Mouth/Throat: Oropharynx is clear and moist. No oropharyngeal exudate.   Cardiovascular: Normal rate, regular rhythm and normal heart sounds.    No murmur heard.  Pulmonary/Chest: Effort normal. No respiratory distress. She has decreased breath sounds. She has no wheezes.   Abdominal: Soft. Bowel sounds are normal. She exhibits no mass. There is no tenderness.   Musculoskeletal: She exhibits no edema.        Left knee: She exhibits deformity. She exhibits no swelling and no effusion. Tenderness found.   Neurological: She is alert and oriented to person, place, and time. She has normal reflexes. No cranial nerve deficit.   Skin: Skin is warm and dry. She is not diaphoretic.   Psychiatric: She has a normal mood and affect. Her behavior is normal.   Nursing note and vitals reviewed.      Assessment/Plan   Problem List Items Addressed This Visit     None      Visit Diagnoses     Chronic pain of left knee    -  Primary    Relevant Medications    oxyCODONE-acetaminophen (PERCOCET)  MG per tablet    Anxiety        Relevant Medications    ALPRAZolam (XANAX) 1 MG tablet    Acute recurrent maxillary sinusitis        Relevant Medications    amoxicillin-clavulanate (AUGMENTIN) 875-125 MG per tablet

## 2018-03-30 DIAGNOSIS — K21.00 GASTROESOPHAGEAL REFLUX DISEASE WITH ESOPHAGITIS: ICD-10-CM

## 2018-04-04 RX ORDER — FEXOFENADINE HCL 180 MG/1
TABLET ORAL
Qty: 30 TABLET | Refills: 5 | Status: SHIPPED | OUTPATIENT
Start: 2018-04-04 | End: 2018-10-23 | Stop reason: SDUPTHER

## 2018-04-04 RX ORDER — RANITIDINE 300 MG/1
TABLET ORAL
Qty: 30 TABLET | Refills: 11 | Status: SHIPPED | OUTPATIENT
Start: 2018-04-04 | End: 2018-09-12

## 2018-05-08 ENCOUNTER — OFFICE VISIT (OUTPATIENT)
Dept: FAMILY MEDICINE CLINIC | Facility: CLINIC | Age: 53
End: 2018-05-08

## 2018-05-08 VITALS
BODY MASS INDEX: 26.36 KG/M2 | TEMPERATURE: 98 F | HEIGHT: 66 IN | SYSTOLIC BLOOD PRESSURE: 110 MMHG | OXYGEN SATURATION: 99 % | DIASTOLIC BLOOD PRESSURE: 68 MMHG | HEART RATE: 68 BPM | WEIGHT: 164 LBS

## 2018-05-08 DIAGNOSIS — M25.562 CHRONIC PAIN OF LEFT KNEE: Primary | ICD-10-CM

## 2018-05-08 DIAGNOSIS — H66.90 ACUTE OTITIS MEDIA, UNSPECIFIED OTITIS MEDIA TYPE: ICD-10-CM

## 2018-05-08 DIAGNOSIS — G89.29 CHRONIC PAIN OF LEFT KNEE: Primary | ICD-10-CM

## 2018-05-08 PROCEDURE — 99213 OFFICE O/P EST LOW 20 MIN: CPT | Performed by: FAMILY MEDICINE

## 2018-05-08 RX ORDER — DOXYCYCLINE HYCLATE 100 MG/1
100 CAPSULE ORAL 2 TIMES DAILY
Qty: 20 CAPSULE | Refills: 0 | Status: SHIPPED | OUTPATIENT
Start: 2018-05-08 | End: 2018-08-01

## 2018-05-08 RX ORDER — DOXYCYCLINE HYCLATE 100 MG/1
100 CAPSULE ORAL 2 TIMES DAILY
Qty: 20 CAPSULE | Refills: 0 | Status: SHIPPED | OUTPATIENT
Start: 2018-05-08 | End: 2018-05-08 | Stop reason: SDUPTHER

## 2018-05-08 RX ORDER — METHYLPREDNISOLONE 4 MG/1
TABLET ORAL
Qty: 1 EACH | Refills: 1 | Status: SHIPPED | OUTPATIENT
Start: 2018-05-08 | End: 2018-08-01

## 2018-05-08 RX ORDER — OXYCODONE AND ACETAMINOPHEN 10; 325 MG/1; MG/1
1 TABLET ORAL EVERY 6 HOURS PRN
Qty: 120 TABLET | Refills: 0 | Status: SHIPPED | OUTPATIENT
Start: 2018-05-08 | End: 2018-06-19 | Stop reason: SDUPTHER

## 2018-05-08 NOTE — PROGRESS NOTES
Subjective   Yuliana Kennedy is a 52 y.o. female    Here for follow-up regarding chronic pain of left knee.  She had hoped to be able to stretch her 120 tablets of oxycodone over 60 days but was unable to do so.  She has been doing more work out of doors and thinks this may have caused her to have more pain.  She has still improved significantly in her usage of opioids.  Hopefully in the future she will get a total knee replacement and be able to get off of opioids altogether.  Complaining of ear pain and drainage which is a chronic/recurrent problem for the patient.      Otitis Media   Associated symptoms include arthralgias, nausea and vomiting. Pertinent negatives include no abdominal pain, chest pain, chills, congestion, coughing, fatigue, fever, headaches, rash, sore throat or weakness.       The following portions of the patient's history were reviewed and updated as appropriate: allergies, current medications, past social history and problem list    Review of Systems   Constitutional: Negative for appetite change, chills, fatigue and fever.   HENT: Positive for ear pain. Negative for congestion, postnasal drip, rhinorrhea, sinus pressure and sore throat.    Eyes: Negative.    Respiratory: Negative for cough, chest tightness, shortness of breath and wheezing.    Cardiovascular: Negative.  Negative for chest pain.   Gastrointestinal: Positive for nausea and vomiting. Negative for abdominal pain and diarrhea.   Endocrine: Negative.    Genitourinary: Negative.    Musculoskeletal: Positive for arthralgias and gait problem.   Skin: Negative for rash and wound.   Allergic/Immunologic: Negative.    Neurological: Negative for dizziness, tremors, weakness, light-headedness and headaches.   Hematological: Negative.  Negative for adenopathy.   Psychiatric/Behavioral: Negative for agitation, behavioral problems, confusion, decreased concentration, dysphoric mood, sleep disturbance and suicidal ideas. The patient is not  nervous/anxious.    All other systems reviewed and are negative.      Objective     Vitals:    05/08/18 1515   BP: 110/68   Pulse: 68   Temp: 98 °F (36.7 °C)   SpO2: 99%       Physical Exam   Constitutional: She is oriented to person, place, and time. She appears well-developed and well-nourished. No distress.   HENT:   Head: Normocephalic and atraumatic.   Right Ear: Ear canal normal. Tympanic membrane is injected.   Left Ear: Ear canal normal. Tympanic membrane is injected.   Mouth/Throat: Oropharynx is clear and moist. No oropharyngeal exudate.   Cardiovascular: Normal rate, regular rhythm and normal heart sounds.    No murmur heard.  Pulmonary/Chest: Effort normal. No respiratory distress. She has decreased breath sounds. She has no wheezes.   Abdominal: Soft. Bowel sounds are normal. She exhibits no mass. There is no tenderness.   Musculoskeletal: She exhibits no edema.        Left knee: She exhibits deformity. She exhibits no swelling and no effusion. Tenderness found.   Neurological: She is alert and oriented to person, place, and time. She has normal reflexes. No cranial nerve deficit.   Skin: Skin is warm and dry. She is not diaphoretic.   Psychiatric: She has a normal mood and affect. Her behavior is normal.   Nursing note and vitals reviewed.      Assessment/Plan   Problem List Items Addressed This Visit     None      Visit Diagnoses     Chronic pain of left knee    -  Primary    Relevant Medications    oxyCODONE-acetaminophen (PERCOCET)  MG per tablet    Acute otitis media, unspecified otitis media type        Relevant Medications    MethylPREDNISolone (MEDROL, NICOLE,) 4 MG tablet    doxycycline (VIBRAMYCIN) 100 MG capsule

## 2018-05-25 DIAGNOSIS — F32.A DEPRESSION, UNSPECIFIED DEPRESSION TYPE: ICD-10-CM

## 2018-05-30 RX ORDER — DULOXETIN HYDROCHLORIDE 60 MG/1
60 CAPSULE, DELAYED RELEASE ORAL DAILY
Qty: 30 CAPSULE | Refills: 2 | Status: SHIPPED | OUTPATIENT
Start: 2018-05-30 | End: 2018-08-24 | Stop reason: SDUPTHER

## 2018-06-19 ENCOUNTER — OFFICE VISIT (OUTPATIENT)
Dept: FAMILY MEDICINE CLINIC | Facility: CLINIC | Age: 53
End: 2018-06-19

## 2018-06-19 VITALS
TEMPERATURE: 98 F | OXYGEN SATURATION: 99 % | BODY MASS INDEX: 26.03 KG/M2 | DIASTOLIC BLOOD PRESSURE: 70 MMHG | HEIGHT: 66 IN | SYSTOLIC BLOOD PRESSURE: 122 MMHG | WEIGHT: 162 LBS | HEART RATE: 64 BPM

## 2018-06-19 DIAGNOSIS — G89.29 CHRONIC PAIN OF LEFT KNEE: Primary | ICD-10-CM

## 2018-06-19 DIAGNOSIS — M25.562 CHRONIC PAIN OF LEFT KNEE: Primary | ICD-10-CM

## 2018-06-19 DIAGNOSIS — F41.9 ANXIETY: ICD-10-CM

## 2018-06-19 DIAGNOSIS — K58.9 IRRITABLE BOWEL SYNDROME, UNSPECIFIED TYPE: ICD-10-CM

## 2018-06-19 DIAGNOSIS — H66.90 ACUTE OTITIS MEDIA, UNSPECIFIED OTITIS MEDIA TYPE: ICD-10-CM

## 2018-06-19 PROCEDURE — 99214 OFFICE O/P EST MOD 30 MIN: CPT | Performed by: FAMILY MEDICINE

## 2018-06-19 RX ORDER — ALPRAZOLAM 1 MG/1
1 TABLET ORAL 3 TIMES DAILY
Qty: 90 TABLET | Refills: 2 | Status: SHIPPED | OUTPATIENT
Start: 2018-06-19 | End: 2018-09-12 | Stop reason: SDUPTHER

## 2018-06-19 RX ORDER — OXYCODONE AND ACETAMINOPHEN 10; 325 MG/1; MG/1
1 TABLET ORAL EVERY 6 HOURS PRN
Qty: 120 TABLET | Refills: 0 | Status: SHIPPED | OUTPATIENT
Start: 2018-06-19 | End: 2018-09-12 | Stop reason: SDUPTHER

## 2018-06-19 RX ORDER — CEPHALEXIN 500 MG/1
500 CAPSULE ORAL 3 TIMES DAILY
Qty: 30 CAPSULE | Refills: 0 | Status: SHIPPED | OUTPATIENT
Start: 2018-06-19 | End: 2018-08-01

## 2018-06-19 NOTE — PROGRESS NOTES
Subjective   Yuliana Kennedy is a 52 y.o. female    Patient presents for follow-up regarding chronic knee pain with opioid dependency and chronic anxiety, due for refill of alprazolam.  Claudio is reviewed regarding her controlled substances and is appropriate.  She has cut back on her use of oxycodone and is encouraged to continue to work on cutting back on this medication.  She is asking for a prophylactic prescription for ear infections as she is going on vacation and has a history of frequent, recurrent otitis media.  She also reports she has had intermittent green stools associated with abdominal cramping.  Patient also reports that she has taken up kayaking.      GI Problem   The primary symptoms include nausea, vomiting and arthralgias. Primary symptoms do not include fever, fatigue, abdominal pain, diarrhea or rash.   The illness does not include chills.   Anxiety   Symptoms include nausea. Patient reports no chest pain, confusion, decreased concentration, dizziness, nervous/anxious behavior, shortness of breath or suicidal ideas.       Knee Pain          The following portions of the patient's history were reviewed and updated as appropriate: allergies, current medications, past social history and problem list    Review of Systems   Constitutional: Negative for appetite change, chills, fatigue and fever.   HENT: Positive for ear pain. Negative for congestion, postnasal drip, rhinorrhea, sinus pressure and sore throat.    Eyes: Negative.    Respiratory: Negative for cough, chest tightness, shortness of breath and wheezing.    Cardiovascular: Negative.  Negative for chest pain.   Gastrointestinal: Positive for nausea and vomiting. Negative for abdominal pain and diarrhea.   Endocrine: Negative.    Genitourinary: Negative.    Musculoskeletal: Positive for arthralgias and gait problem.   Skin: Negative for rash and wound.   Allergic/Immunologic: Negative.    Neurological: Negative for dizziness, tremors,  weakness, light-headedness and headaches.   Hematological: Negative.  Negative for adenopathy.   Psychiatric/Behavioral: Negative for agitation, behavioral problems, confusion, decreased concentration, dysphoric mood, sleep disturbance and suicidal ideas. The patient is not nervous/anxious.    All other systems reviewed and are negative.      Objective     Vitals:    06/19/18 1448   BP: 122/70   Pulse: 64   Temp: 98 °F (36.7 °C)   SpO2: 99%       Physical Exam   Constitutional: She is oriented to person, place, and time. She appears well-developed and well-nourished. No distress.   HENT:   Head: Normocephalic and atraumatic.   Right Ear: Ear canal normal.   Left Ear: Ear canal normal.   Mouth/Throat: Oropharynx is clear and moist. No oropharyngeal exudate.   Cardiovascular: Normal rate, regular rhythm and normal heart sounds.    No murmur heard.  Pulmonary/Chest: Effort normal. No respiratory distress. She has decreased breath sounds. She has no wheezes.   Abdominal: Soft. Bowel sounds are normal. She exhibits no mass. There is no tenderness.   Musculoskeletal: She exhibits no edema.        Left knee: She exhibits deformity. She exhibits no swelling and no effusion. Tenderness found.   Neurological: She is alert and oriented to person, place, and time. She has normal reflexes. No cranial nerve deficit.   Skin: Skin is warm and dry. She is not diaphoretic.   Psychiatric: She has a normal mood and affect. Her behavior is normal.   Nursing note and vitals reviewed.      Assessment/Plan   Problem List Items Addressed This Visit     None      Visit Diagnoses     Chronic pain of left knee    -  Primary    Relevant Medications    oxyCODONE-acetaminophen (PERCOCET)  MG per tablet    Anxiety        Relevant Medications    ALPRAZolam (XANAX) 1 MG tablet    Irritable bowel syndrome, unspecified type        Acute otitis media, unspecified otitis media type        Relevant Medications    cephalexin (KEFLEX) 500 MG capsule

## 2018-07-23 ENCOUNTER — TELEPHONE (OUTPATIENT)
Dept: FAMILY MEDICINE CLINIC | Facility: CLINIC | Age: 53
End: 2018-07-23

## 2018-07-23 NOTE — TELEPHONE ENCOUNTER
Pt is having some problems with hemorrhoids.  She is having irritation, itching, burning, and discomfort.  Wanting to know if she can get something called in to help.

## 2018-08-01 ENCOUNTER — OFFICE VISIT (OUTPATIENT)
Dept: FAMILY MEDICINE CLINIC | Facility: CLINIC | Age: 53
End: 2018-08-01

## 2018-08-01 VITALS
SYSTOLIC BLOOD PRESSURE: 132 MMHG | OXYGEN SATURATION: 99 % | HEART RATE: 76 BPM | DIASTOLIC BLOOD PRESSURE: 80 MMHG | TEMPERATURE: 97.8 F | WEIGHT: 164.6 LBS | HEIGHT: 66 IN | BODY MASS INDEX: 26.45 KG/M2

## 2018-08-01 DIAGNOSIS — G89.29 CHRONIC PAIN OF LEFT KNEE: Primary | ICD-10-CM

## 2018-08-01 DIAGNOSIS — K59.03 DRUG-INDUCED CONSTIPATION: ICD-10-CM

## 2018-08-01 DIAGNOSIS — M25.562 CHRONIC PAIN OF LEFT KNEE: Primary | ICD-10-CM

## 2018-08-01 PROCEDURE — 99213 OFFICE O/P EST LOW 20 MIN: CPT | Performed by: PHYSICIAN ASSISTANT

## 2018-08-01 NOTE — PROGRESS NOTES
Subjective   Yuliana Kennedy is a 52 y.o. female  Knee Pain (Follow up on bilateral knee pain, req refill on oxycodone )      History of Present Illness  Patient comes in today for one-month follow-up on chronic knee pain associated with degenerative joint disease.  She is due for refills on her Percocet that she takes 4 times daily for chronic knee pain.  She states her knee pain is stable on this medication regimen.  She does occasionally have constipation, states this is been chronically stable.  She is not taking anything currently for constipation.  The following portions of the patient's history were reviewed and updated as appropriate: allergies, current medications, past social history and problem list    Review of Systems   Constitutional: Positive for activity change.   Musculoskeletal: Positive for arthralgias, gait problem and myalgias. Negative for joint swelling.   Skin: Negative.    Neurological: Negative for weakness and numbness.       Objective     Vitals:    08/01/18 1335   BP: 132/80   Pulse: 76   Temp: 97.8 °F (36.6 °C)   SpO2: 99%       Physical Exam   Constitutional: She appears well-developed and well-nourished. No distress.   Musculoskeletal: She exhibits tenderness ( Joint tenderness in knees with flexion and extension). She exhibits no edema or deformity.   Neurological: She exhibits normal muscle tone. Coordination normal.   Skin: Skin is warm and dry. No rash noted. She is not diaphoretic. No erythema. No pallor.   Psychiatric: She has a normal mood and affect. Her behavior is normal. Judgment and thought content normal.   Nursing note and vitals reviewed.      Assessment/Plan     Diagnoses and all orders for this visit:    Chronic pain of left knee    Drug-induced constipation    Prescription given for Percocet 10/325 one 4 times a day for chronic knee pain #120 no refillsClaudio reviewed, appropriate, discussed abuse potential this medication, follow-up in office in one months for  recheck.

## 2018-08-24 DIAGNOSIS — F32.A DEPRESSION, UNSPECIFIED DEPRESSION TYPE: ICD-10-CM

## 2018-08-29 RX ORDER — DULOXETIN HYDROCHLORIDE 60 MG/1
60 CAPSULE, DELAYED RELEASE ORAL DAILY
Qty: 30 CAPSULE | Refills: 2 | Status: SHIPPED | OUTPATIENT
Start: 2018-08-29 | End: 2018-10-23 | Stop reason: SDUPTHER

## 2018-09-12 ENCOUNTER — OFFICE VISIT (OUTPATIENT)
Dept: FAMILY MEDICINE CLINIC | Facility: CLINIC | Age: 53
End: 2018-09-12

## 2018-09-12 VITALS
SYSTOLIC BLOOD PRESSURE: 130 MMHG | TEMPERATURE: 98.4 F | HEIGHT: 66 IN | WEIGHT: 162 LBS | OXYGEN SATURATION: 98 % | DIASTOLIC BLOOD PRESSURE: 80 MMHG | HEART RATE: 76 BPM | BODY MASS INDEX: 26.03 KG/M2

## 2018-09-12 DIAGNOSIS — M25.562 CHRONIC PAIN OF LEFT KNEE: ICD-10-CM

## 2018-09-12 DIAGNOSIS — F32.A DEPRESSION, UNSPECIFIED DEPRESSION TYPE: ICD-10-CM

## 2018-09-12 DIAGNOSIS — G89.29 CHRONIC PAIN OF LEFT KNEE: ICD-10-CM

## 2018-09-12 DIAGNOSIS — K21.00 GASTROESOPHAGEAL REFLUX DISEASE WITH ESOPHAGITIS: Primary | ICD-10-CM

## 2018-09-12 DIAGNOSIS — F41.9 ANXIETY: ICD-10-CM

## 2018-09-12 PROCEDURE — 99213 OFFICE O/P EST LOW 20 MIN: CPT | Performed by: FAMILY MEDICINE

## 2018-09-12 RX ORDER — ALPRAZOLAM 1 MG/1
1 TABLET ORAL 3 TIMES DAILY
Qty: 90 TABLET | Refills: 5 | Status: SHIPPED | OUTPATIENT
Start: 2018-09-12 | End: 2019-07-08 | Stop reason: SDUPTHER

## 2018-09-12 RX ORDER — OXYCODONE AND ACETAMINOPHEN 10; 325 MG/1; MG/1
1 TABLET ORAL EVERY 6 HOURS PRN
Qty: 120 TABLET | Refills: 0 | Status: SHIPPED | OUTPATIENT
Start: 2018-09-12 | End: 2018-11-30 | Stop reason: SDUPTHER

## 2018-09-12 RX ORDER — OMEPRAZOLE 40 MG/1
40 CAPSULE, DELAYED RELEASE ORAL
Qty: 30 CAPSULE | Refills: 11 | Status: SHIPPED | OUTPATIENT
Start: 2018-09-12 | End: 2019-08-23 | Stop reason: SDUPTHER

## 2018-09-12 NOTE — PROGRESS NOTES
Subjective   Yuliana Kennedy is a 52 y.o. female    Chief Complaint  Chronic knee pain  Anxiety  GERD    History of Present Illness  Patient presents for follow-up regarding chronic knee pain with opioid dependency.  She is on oxycodone 10 mg 2-3 times every day.  She has cut back some from 4 times a day.  She is eventually going to require a total knee arthroplasty.  She has been having a lot of problems with heartburn and indigestion since she stopped her PPI.  She has been using large amounts of antacids which is causing excessive flatulence.  I recommended some medication changes for her today.    The following portions of the patient's history were reviewed and updated as appropriate: allergies, current medications, past social history and problem list    Review of Systems   Constitutional: Negative for appetite change, chills, fatigue and fever.   HENT: Positive for ear pain. Negative for congestion, postnasal drip, rhinorrhea, sinus pressure and sore throat.    Eyes: Negative.    Respiratory: Negative for cough, chest tightness, shortness of breath and wheezing.    Cardiovascular: Negative.  Negative for chest pain.   Gastrointestinal: Positive for vomiting. Negative for abdominal pain, diarrhea and nausea.   Endocrine: Negative.    Genitourinary: Negative.    Musculoskeletal: Positive for arthralgias and gait problem.   Skin: Negative for rash and wound.   Allergic/Immunologic: Negative.    Neurological: Negative for dizziness, tremors, weakness, light-headedness and headaches.   Hematological: Negative.  Negative for adenopathy.   Psychiatric/Behavioral: Positive for dysphoric mood and sleep disturbance. Negative for agitation, behavioral problems, confusion, decreased concentration and suicidal ideas. The patient is nervous/anxious.    All other systems reviewed and are negative.      Objective     Vitals:    09/12/18 1420   BP: 130/80   Pulse: 76   Temp: 98.4 °F (36.9 °C)   SpO2: 98%       Physical Exam    Constitutional: She is oriented to person, place, and time. She appears well-developed and well-nourished. No distress.   HENT:   Head: Normocephalic and atraumatic.   Right Ear: Ear canal normal.   Left Ear: Ear canal normal.   Mouth/Throat: Oropharynx is clear and moist. No oropharyngeal exudate.   Eyes: Pupils are equal, round, and reactive to light. Conjunctivae are normal.   Neck: Normal range of motion. Neck supple. No thyromegaly present.   Cardiovascular: Normal rate, regular rhythm and normal heart sounds.    No murmur heard.  Pulmonary/Chest: Effort normal. No respiratory distress. She has decreased breath sounds. She has no wheezes.   Abdominal: Soft. Bowel sounds are normal. She exhibits no mass. There is no tenderness.   Musculoskeletal: She exhibits tenderness. She exhibits no edema.        Left knee: She exhibits deformity. She exhibits no swelling and no effusion. Tenderness found.   Neurological: She is alert and oriented to person, place, and time. She has normal reflexes. No cranial nerve deficit.   Skin: Skin is warm and dry. She is not diaphoretic.   Psychiatric: She has a normal mood and affect. Her behavior is normal.   Nursing note and vitals reviewed.      Assessment/Plan   Problem List Items Addressed This Visit     None      Visit Diagnoses     Gastroesophageal reflux disease with esophagitis    -  Primary    Relevant Medications    omeprazole (priLOSEC) 40 MG capsule    Anxiety        Relevant Medications    ALPRAZolam (XANAX) 1 MG tablet    Chronic pain of left knee        Relevant Medications    oxyCODONE-acetaminophen (PERCOCET)  MG per tablet    Depression, unspecified depression type        Relevant Medications    ALPRAZolam (XANAX) 1 MG tablet

## 2018-10-23 ENCOUNTER — OFFICE VISIT (OUTPATIENT)
Dept: FAMILY MEDICINE CLINIC | Facility: CLINIC | Age: 53
End: 2018-10-23

## 2018-10-23 VITALS
OXYGEN SATURATION: 98 % | TEMPERATURE: 97.7 F | HEART RATE: 71 BPM | DIASTOLIC BLOOD PRESSURE: 70 MMHG | BODY MASS INDEX: 26.52 KG/M2 | WEIGHT: 165 LBS | HEIGHT: 66 IN | SYSTOLIC BLOOD PRESSURE: 118 MMHG

## 2018-10-23 DIAGNOSIS — M25.562 CHRONIC PAIN OF LEFT KNEE: Primary | ICD-10-CM

## 2018-10-23 DIAGNOSIS — F32.A DEPRESSION, UNSPECIFIED DEPRESSION TYPE: ICD-10-CM

## 2018-10-23 DIAGNOSIS — J30.9 ALLERGIC RHINITIS, UNSPECIFIED SEASONALITY, UNSPECIFIED TRIGGER: ICD-10-CM

## 2018-10-23 DIAGNOSIS — G89.29 CHRONIC PAIN OF LEFT KNEE: Primary | ICD-10-CM

## 2018-10-23 DIAGNOSIS — M17.12 ARTHRITIS OF LEFT KNEE: ICD-10-CM

## 2018-10-23 PROCEDURE — 99213 OFFICE O/P EST LOW 20 MIN: CPT | Performed by: PHYSICIAN ASSISTANT

## 2018-10-23 RX ORDER — FEXOFENADINE HCL 180 MG/1
180 TABLET ORAL DAILY
Qty: 30 TABLET | Refills: 11 | Status: SHIPPED | OUTPATIENT
Start: 2018-10-23 | End: 2019-01-21

## 2018-10-23 RX ORDER — DULOXETIN HYDROCHLORIDE 60 MG/1
60 CAPSULE, DELAYED RELEASE ORAL DAILY
Qty: 30 CAPSULE | Refills: 11 | Status: SHIPPED | OUTPATIENT
Start: 2018-10-23 | End: 2019-01-21

## 2018-10-23 RX ORDER — RANITIDINE 300 MG/1
300 TABLET ORAL EVERY EVENING
Refills: 11 | COMMUNITY
Start: 2018-10-19 | End: 2018-11-30

## 2018-10-23 NOTE — PROGRESS NOTES
Subjective   Yuliana Kennedy is a 53 y.o. female  Knee Pain (follow up on knee pain, req refills on oxycodone )      History of Present Illness  Patient comes in today for follow-up on chronic knee pain associated to degenerative joint disease.  She is doing well with the assistance of Percocet 4 times daily.  She states that her pain is significant without this and limits her from daily activities.  By taking Percocet 4 times as she is able to carry out ADLs and denies any adverse effects from medication.  Patient is also due for refills on her Cymbalta and her Allegra.  The following portions of the patient's history were reviewed and updated as appropriate: allergies, current medications, past social history and problem list    Review of Systems   Constitutional: Positive for activity change.   Musculoskeletal: Positive for arthralgias, gait problem and myalgias. Negative for joint swelling.   Skin: Negative.    Neurological: Negative for weakness and numbness.       Objective     Vitals:    10/23/18 1437   BP: 118/70   Pulse: 71   Temp: 97.7 °F (36.5 °C)   SpO2: 98%       Physical Exam   Constitutional: She appears well-developed and well-nourished. No distress.   Cardiovascular: Normal rate, regular rhythm and normal heart sounds.    Musculoskeletal: She exhibits tenderness ( Tenderness left knee with decreased range of motion and slight limp to walk.). She exhibits no edema or deformity.   Neurological: She exhibits normal muscle tone. Coordination normal.   Skin: Skin is warm and dry. No rash noted. She is not diaphoretic. No erythema. No pallor.   Psychiatric: She has a normal mood and affect. Her behavior is normal. Judgment and thought content normal.   Nursing note and vitals reviewed.      Assessment/Plan     Diagnoses and all orders for this visit:    Chronic pain of left knee    Arthritis of left knee    Depression, unspecified depression type  -     DULoxetine (CYMBALTA) 60 MG capsule; Take 1 capsule  by mouth Daily.    Allergic rhinitis, unspecified seasonality, unspecified trigger    Other orders  -     raNITIdine (ZANTAC) 300 MG tablet; Take 300 mg by mouth Every Evening. Take 300 mg by mouth Every Evening.  -     fexofenadine (ALLEGRA) 180 MG tablet; Take 1 tablet by mouth Daily.    Refilled Percocet 10/325 one 4 times a day for chronic pain in left knee #120 with no refills discussed abuse potential is medication, Claudio reviewed, appropriate, follow-up in one month for recheck.

## 2018-11-30 ENCOUNTER — OFFICE VISIT (OUTPATIENT)
Dept: FAMILY MEDICINE CLINIC | Facility: CLINIC | Age: 53
End: 2018-11-30

## 2018-11-30 VITALS
BODY MASS INDEX: 26.84 KG/M2 | SYSTOLIC BLOOD PRESSURE: 118 MMHG | HEART RATE: 105 BPM | TEMPERATURE: 98.6 F | HEIGHT: 66 IN | OXYGEN SATURATION: 98 % | DIASTOLIC BLOOD PRESSURE: 84 MMHG | WEIGHT: 167 LBS

## 2018-11-30 DIAGNOSIS — M25.562 CHRONIC PAIN OF LEFT KNEE: Primary | ICD-10-CM

## 2018-11-30 DIAGNOSIS — H60.90 RECURRENT OTITIS EXTERNA, UNSPECIFIED LATERALITY: ICD-10-CM

## 2018-11-30 DIAGNOSIS — G89.29 CHRONIC PAIN OF LEFT KNEE: Primary | ICD-10-CM

## 2018-11-30 PROCEDURE — 99213 OFFICE O/P EST LOW 20 MIN: CPT | Performed by: FAMILY MEDICINE

## 2018-11-30 RX ORDER — OXYCODONE AND ACETAMINOPHEN 10; 325 MG/1; MG/1
1 TABLET ORAL EVERY 6 HOURS PRN
Qty: 120 TABLET | Refills: 0 | Status: SHIPPED | OUTPATIENT
Start: 2018-11-30 | End: 2019-01-21 | Stop reason: SDUPTHER

## 2018-12-01 NOTE — PROGRESS NOTES
Subjective   Yuliana Kennedy is a 53 y.o. female    Chief Complaint    Left knee pain and recurrent ear infection        History of Present Illness   Patient presents today for follow-up regarding chronic left knee pain after serious knee injury.  She has been on chronic opioid therapy since her injury.  She had weaned down to using 2-3 tablets per day of oxycodone but due to increased pain she is back up to 4 tablets per day.  Her Claudio is reviewed and confirms this.  According to her specialists she is a candidate for a total knee replacement.  Chronic recurrent ear infections.  Requests prescription for antibiotic to avoid delay in treatment.      The following portions of the patient's history were reviewed and updated as appropriate: allergies, current medications, past social history and problem list    Review of Systems   Constitutional: Negative for appetite change, chills, fatigue and fever.   HENT: Positive for ear pain. Negative for congestion, postnasal drip, rhinorrhea, sinus pressure and sore throat.    Eyes: Negative.    Respiratory: Negative for cough, chest tightness, shortness of breath and wheezing.    Cardiovascular: Negative.  Negative for chest pain.   Gastrointestinal: Positive for vomiting. Negative for abdominal pain, diarrhea and nausea.   Endocrine: Negative.    Genitourinary: Negative.    Musculoskeletal: Positive for arthralgias and gait problem.   Skin: Negative for rash and wound.   Allergic/Immunologic: Negative.    Neurological: Negative for dizziness, tremors, weakness, light-headedness and headaches.   Hematological: Negative.  Negative for adenopathy.   Psychiatric/Behavioral: Positive for dysphoric mood and sleep disturbance. Negative for agitation, behavioral problems, confusion, decreased concentration and suicidal ideas. The patient is nervous/anxious.    All other systems reviewed and are negative.      Objective     Vitals:    11/30/18 1317   BP: 118/84   Pulse: 105   Temp:  98.6 °F (37 °C)   SpO2: 98%       Physical Exam   Constitutional: She is oriented to person, place, and time. She appears well-developed and well-nourished. No distress.   HENT:   Head: Normocephalic and atraumatic.   Right Ear: Ear canal normal.   Left Ear: Ear canal normal.   Mouth/Throat: Oropharynx is clear and moist. No oropharyngeal exudate.   Eyes: Conjunctivae are normal. Pupils are equal, round, and reactive to light.   Neck: Normal range of motion. Neck supple. No thyromegaly present.   Cardiovascular: Normal rate, regular rhythm and normal heart sounds.   No murmur heard.  Pulmonary/Chest: Effort normal. No respiratory distress. She has decreased breath sounds. She has no wheezes.   Abdominal: Soft. Bowel sounds are normal. She exhibits no mass. There is no tenderness.   Musculoskeletal: She exhibits tenderness. She exhibits no edema.        Left knee: She exhibits deformity. She exhibits no swelling and no effusion. Tenderness found.   Neurological: She is alert and oriented to person, place, and time. She has normal reflexes. No cranial nerve deficit.   Skin: Skin is warm and dry. She is not diaphoretic.   Psychiatric: She has a normal mood and affect. Her behavior is normal.   Nursing note and vitals reviewed.      Assessment/Plan   Problem List Items Addressed This Visit     None      Visit Diagnoses     Chronic pain of left knee        Relevant Medications    oxyCODONE-acetaminophen (PERCOCET)  MG per tablet

## 2019-01-21 ENCOUNTER — OFFICE VISIT (OUTPATIENT)
Dept: FAMILY MEDICINE CLINIC | Facility: CLINIC | Age: 54
End: 2019-01-21

## 2019-01-21 VITALS
TEMPERATURE: 98.7 F | HEART RATE: 67 BPM | WEIGHT: 165 LBS | DIASTOLIC BLOOD PRESSURE: 82 MMHG | SYSTOLIC BLOOD PRESSURE: 120 MMHG | OXYGEN SATURATION: 98 % | HEIGHT: 66 IN | BODY MASS INDEX: 26.52 KG/M2

## 2019-01-21 DIAGNOSIS — J30.9 ALLERGIC RHINITIS, UNSPECIFIED SEASONALITY, UNSPECIFIED TRIGGER: ICD-10-CM

## 2019-01-21 DIAGNOSIS — G89.29 CHRONIC PAIN OF LEFT KNEE: Primary | ICD-10-CM

## 2019-01-21 DIAGNOSIS — K21.00 GASTROESOPHAGEAL REFLUX DISEASE WITH ESOPHAGITIS: ICD-10-CM

## 2019-01-21 DIAGNOSIS — M25.562 CHRONIC PAIN OF LEFT KNEE: Primary | ICD-10-CM

## 2019-01-21 DIAGNOSIS — F41.9 ANXIETY: ICD-10-CM

## 2019-01-21 DIAGNOSIS — F32.A DEPRESSION, UNSPECIFIED DEPRESSION TYPE: ICD-10-CM

## 2019-01-21 PROCEDURE — 99214 OFFICE O/P EST MOD 30 MIN: CPT | Performed by: FAMILY MEDICINE

## 2019-01-21 RX ORDER — OXYCODONE AND ACETAMINOPHEN 10; 325 MG/1; MG/1
1 TABLET ORAL EVERY 6 HOURS PRN
Qty: 120 TABLET | Refills: 0 | Status: SHIPPED | OUTPATIENT
Start: 2019-01-21 | End: 2019-07-08 | Stop reason: SDUPTHER

## 2019-01-21 RX ORDER — SERTRALINE HYDROCHLORIDE 100 MG/1
100 TABLET, FILM COATED ORAL DAILY
Qty: 30 TABLET | Refills: 11 | Status: SHIPPED | OUTPATIENT
Start: 2019-01-21 | End: 2020-01-10

## 2019-01-21 RX ORDER — CETIRIZINE HYDROCHLORIDE, PSEUDOEPHEDRINE HYDROCHLORIDE 5; 120 MG/1; MG/1
1 TABLET, FILM COATED, EXTENDED RELEASE ORAL 2 TIMES DAILY
Qty: 30 TABLET | Refills: 5 | Status: SHIPPED | OUTPATIENT
Start: 2019-01-21 | End: 2020-05-26

## 2019-01-21 NOTE — PROGRESS NOTES
Subjective   Yuliana Kennedy is a 53 y.o. female    Chief Complaint    Chronic pain left knee  Allergic rhinitis  Anxiety  Depression      History of Present Illness   Patient presents for follow-up visit related to chronic pain of the left knee related to previous trauma, allergic rhinitis, anxiety, depression and GERD.  She is due for several refills.  Claudio is reviewed and is appropriate regarding her oxycodone use.  She has been trying to cut back on the number of oxycodone tablets she takes on a daily basis.  I've asked her to continue to try to wean herself slowly off of the Percocet.  She is also encouraged to quit smoking as this would help her sinus and allergy symptoms.  She is asking to be switched back to Zoloft as it seemed to help her anxiety and depression better than the duloxetine.  She felt the Zoloft was causing her GERD symptoms to be worse.  She reports she has not had any reflux symptoms since starting the omeprazole.  We will continue with that and switch her back to the Zoloft.      The following portions of the patient's history were reviewed and updated as appropriate: allergies, current medications, past social history and problem list    Review of Systems   Constitutional: Negative for appetite change, chills, fatigue and fever.   HENT: Positive for ear pain and sinus pressure. Negative for congestion, postnasal drip, rhinorrhea, sore throat and trouble swallowing.    Eyes: Negative.    Respiratory: Negative for cough, chest tightness, shortness of breath and wheezing.    Cardiovascular: Negative.  Negative for chest pain and palpitations.   Gastrointestinal: Negative for abdominal pain, diarrhea, nausea and vomiting.   Endocrine: Negative.    Genitourinary: Negative.    Musculoskeletal: Positive for arthralgias and gait problem.   Skin: Negative for rash and wound.   Allergic/Immunologic: Negative.    Neurological: Negative for dizziness, tremors, weakness, light-headedness and  headaches.   Hematological: Negative.  Negative for adenopathy.   Psychiatric/Behavioral: Positive for dysphoric mood and sleep disturbance. Negative for agitation, behavioral problems, confusion, decreased concentration and suicidal ideas. The patient is nervous/anxious.    All other systems reviewed and are negative.      Objective     Vitals:    01/21/19 1317   BP: 120/82   Pulse: 67   Temp: 98.7 °F (37.1 °C)   SpO2: 98%       Physical Exam   Constitutional: She is oriented to person, place, and time. She appears well-developed and well-nourished. No distress.   HENT:   Head: Normocephalic and atraumatic.   Right Ear: Ear canal normal.   Left Ear: Ear canal normal.   Mouth/Throat: Oropharynx is clear and moist. No oropharyngeal exudate.   Eyes: Conjunctivae are normal. Pupils are equal, round, and reactive to light.   Neck: Normal range of motion. Neck supple. No thyromegaly present.   Cardiovascular: Normal rate, regular rhythm and normal heart sounds.   No murmur heard.  Pulmonary/Chest: Effort normal. No respiratory distress. She has decreased breath sounds. She has no wheezes.   Abdominal: Soft. Bowel sounds are normal. She exhibits no mass. There is no tenderness.   Musculoskeletal: She exhibits tenderness. She exhibits no edema.        Left knee: She exhibits deformity. She exhibits no swelling and no effusion. Tenderness found.   Neurological: She is alert and oriented to person, place, and time. She has normal reflexes. No cranial nerve deficit.   Skin: Skin is warm and dry. She is not diaphoretic.   Psychiatric: She has a normal mood and affect. Her behavior is normal.   Nursing note and vitals reviewed.      Assessment/Plan   Problem List Items Addressed This Visit        Respiratory    Allergic rhinitis    Relevant Medications    cetirizine-pseudoephedrine (ZyrTEC-D) 5-120 MG per 12 hr tablet       Digestive    GERD (gastroesophageal reflux disease)      Other Visit Diagnoses     Chronic pain of left  knee    -  Primary    Relevant Medications    oxyCODONE-acetaminophen (PERCOCET)  MG per tablet    Depression, unspecified depression type        Relevant Medications    sertraline (ZOLOFT) 100 MG tablet    Anxiety        Relevant Medications    sertraline (ZOLOFT) 100 MG tablet

## 2019-03-12 ENCOUNTER — OFFICE VISIT (OUTPATIENT)
Dept: FAMILY MEDICINE CLINIC | Facility: CLINIC | Age: 54
End: 2019-03-12

## 2019-03-12 VITALS
TEMPERATURE: 98.2 F | OXYGEN SATURATION: 99 % | HEIGHT: 66 IN | WEIGHT: 165 LBS | BODY MASS INDEX: 26.52 KG/M2 | SYSTOLIC BLOOD PRESSURE: 120 MMHG | DIASTOLIC BLOOD PRESSURE: 84 MMHG | HEART RATE: 65 BPM

## 2019-03-12 DIAGNOSIS — J32.9 CHRONIC SINUSITIS, UNSPECIFIED LOCATION: ICD-10-CM

## 2019-03-12 DIAGNOSIS — G89.29 CHRONIC PAIN OF LEFT KNEE: ICD-10-CM

## 2019-03-12 DIAGNOSIS — M17.12 ARTHRITIS OF LEFT KNEE: ICD-10-CM

## 2019-03-12 DIAGNOSIS — F41.9 ANXIETY: Primary | ICD-10-CM

## 2019-03-12 DIAGNOSIS — M25.562 CHRONIC PAIN OF LEFT KNEE: ICD-10-CM

## 2019-03-12 PROCEDURE — 99214 OFFICE O/P EST MOD 30 MIN: CPT | Performed by: PHYSICIAN ASSISTANT

## 2019-03-12 RX ORDER — CEFDINIR 300 MG/1
300 CAPSULE ORAL 2 TIMES DAILY
Qty: 20 CAPSULE | Refills: 0 | Status: SHIPPED | OUTPATIENT
Start: 2019-03-12 | End: 2019-05-06

## 2019-03-12 RX ORDER — FLUTICASONE PROPIONATE 50 MCG
2 SPRAY, SUSPENSION (ML) NASAL DAILY
Qty: 1 BOTTLE | Refills: 11 | Status: SHIPPED | OUTPATIENT
Start: 2019-03-12 | End: 2020-05-26

## 2019-03-12 NOTE — PROGRESS NOTES
Subjective   Yuliana Kennedy is a 53 y.o. female  Anxiety (follow up on anxiety, req refiolls on alprazolam ); Knee Pain (follow up left knee pain, req refill on Oxycodone ); and Sinus Problem (sinus pressure, req refill on Flonase )      History of Present Illness  Patient comes in today for 3 separate issues for evaluation and treatment.  She is here for follow-up on chronic knee pain associated with arthritis which is currently stable on Percocet 4 times daily.  She states that she has ongoing daily knee pain which is severe enough to prevent her from being able to take care of her daily needs without Percocet.  With Percocet her pain is at a 5 out of 10.  She denies any adverse effects from this medication.  She states that she takes this 4 times daily as prescribed.  Second issue is for follow-up on generalized anxiety disorder.  This is currently stable on Zoloft 100 mg daily along with Xanax 3 times daily.  She is due for refills of Xanax.  Denies any adverse effects from medication.  Third issue is uncontrolled recurrent sinus problems.  She states that she is currently on amoxicillin and continues to have a lot of sinus pressure.  She has a long history of recurrent sinusitis.  She states she has had a lot of difficulty with pressure over her sinuses, stuffy nose, postnasal drainage and scratchy throat with occasional cough.  The following portions of the patient's history were reviewed and updated as appropriate: allergies, current medications, past social history and problem list    Review of Systems   Constitutional: Negative for activity change, appetite change, chills, fatigue and fever.   HENT: Positive for congestion, postnasal drip, rhinorrhea and sinus pressure. Negative for ear pain and sore throat.    Eyes: Negative for pain.   Respiratory: Positive for cough. Negative for chest tightness and shortness of breath.    Gastrointestinal: Negative for abdominal pain, diarrhea and nausea.    Musculoskeletal: Positive for arthralgias and myalgias. Negative for gait problem and joint swelling.   Skin: Negative.    Neurological: Positive for headaches. Negative for dizziness, tremors, weakness, light-headedness and numbness.   Hematological: Negative for adenopathy.   Psychiatric/Behavioral: Negative for agitation, behavioral problems, confusion, decreased concentration, dysphoric mood, sleep disturbance and suicidal ideas. The patient is nervous/anxious.         Stable on medication       Objective     Vitals:    03/12/19 1521   BP: 120/84   Pulse: 65   Temp: 98.2 °F (36.8 °C)   SpO2: 99%       Physical Exam   Constitutional: She is oriented to person, place, and time. She appears well-developed and well-nourished. No distress.   HENT:   Head: Normocephalic and atraumatic.   Right Ear: Tympanic membrane and ear canal normal.   Left Ear: Tympanic membrane and ear canal normal.   Nose: Mucosal edema, rhinorrhea and sinus tenderness present. Right sinus exhibits maxillary sinus tenderness and frontal sinus tenderness. Left sinus exhibits maxillary sinus tenderness and frontal sinus tenderness.   Mouth/Throat: Oropharynx is clear and moist. No oropharyngeal exudate.   Eyes: Pupils are equal, round, and reactive to light.   Neck: No thyroid mass and no thyromegaly present.   Cardiovascular: Normal rate, regular rhythm and normal heart sounds.   Pulmonary/Chest: Effort normal and breath sounds normal. No respiratory distress.   Musculoskeletal: She exhibits tenderness. She exhibits no edema or deformity.   Neurological: She is alert and oriented to person, place, and time. No cranial nerve deficit. She exhibits normal muscle tone. Coordination normal.   Skin: Skin is warm and dry. No rash noted. She is not diaphoretic. No erythema. No pallor.   Psychiatric: Her speech is normal and behavior is normal. Judgment and thought content normal. Her mood appears anxious. Her affect is not angry and not inappropriate.  Cognition and memory are normal. She does not exhibit a depressed mood. She is attentive.   Nursing note and vitals reviewed.      Assessment/Plan     Diagnoses and all orders for this visit:    Anxiety    Chronic pain of left knee    Arthritis of left knee    Chronic sinusitis, unspecified location    Other orders  -     cefdinir (OMNICEF) 300 MG capsule; Take 1 capsule by mouth 2 (Two) Times a Day.  -     fluticasone (FLONASE) 50 MCG/ACT nasal spray; 2 sprays into the nostril(s) as directed by provider Daily.    Refilled Xanax 1 mg 3 times daily #90 with 5 refills discussed abuse potential of this medication.  Refilled Percocet 10/320 514 times daily for chronic knee pain associated with arthritis #120 with no refills.  Discussed abuse potential this medication, Claudio reviewed, appropriate.  Follow-up in office in 1 month for recheck.  Prescription given for Flonase and Omnicef, if symptoms persist would recommend referral to ENT due to recurrent nature of sinusitis.

## 2019-03-13 NOTE — PROGRESS NOTES
I have reviewed the notes, assessments, and/or procedures performed by Francheska Nickerson PA-C, I concur with her documentation of Yuliana Kennedy.

## 2019-05-06 ENCOUNTER — OFFICE VISIT (OUTPATIENT)
Dept: FAMILY MEDICINE CLINIC | Facility: CLINIC | Age: 54
End: 2019-05-06

## 2019-05-06 VITALS
HEART RATE: 74 BPM | HEIGHT: 66 IN | DIASTOLIC BLOOD PRESSURE: 80 MMHG | OXYGEN SATURATION: 99 % | SYSTOLIC BLOOD PRESSURE: 124 MMHG | TEMPERATURE: 98.4 F | WEIGHT: 163 LBS | BODY MASS INDEX: 26.2 KG/M2

## 2019-05-06 DIAGNOSIS — M17.12 ARTHRITIS OF LEFT KNEE: ICD-10-CM

## 2019-05-06 DIAGNOSIS — M25.562 CHRONIC PAIN OF LEFT KNEE: ICD-10-CM

## 2019-05-06 DIAGNOSIS — G89.29 CHRONIC PAIN OF LEFT KNEE: ICD-10-CM

## 2019-05-06 DIAGNOSIS — K12.0 APHTHOUS ULCER: Primary | ICD-10-CM

## 2019-05-06 PROCEDURE — 99214 OFFICE O/P EST MOD 30 MIN: CPT | Performed by: PHYSICIAN ASSISTANT

## 2019-05-06 RX ORDER — FEXOFENADINE HCL 180 MG/1
180 TABLET ORAL DAILY
Refills: 11 | COMMUNITY
Start: 2019-05-03 | End: 2020-05-26 | Stop reason: SDUPTHER

## 2019-05-06 NOTE — PROGRESS NOTES
Subjective   Yuliana Kennedy is a 53 y.o. female  Knee Pain (Req refill on oxycodone for knee pain) and Mouth Injury (Injury to bottom lip due to fall yesterday)      History of Present Illness  Patient comes in today for 2 separate issues one is a new problem is one is a chronic medical problem stable on medication.  She is here for follow-up on chronic knee pain associated with severe degenerative joint disease in her knees.  She states that she continues to have severe pain typically 5 out of 10 with difficulty walking secondary to limp on her Percocet.  Without Percocet she states she cannot even get out of bed.  She states that in fact her knee gave out on her on Sunday causing her to fall and hit her mouth on the side table next to her sofa hitting her lip.  She did not go to the ER.  She states that is been swollen since then.  She did not break her teeth.  The following portions of the patient's history were reviewed and updated as appropriate: allergies, current medications, past social history and problem list    Review of Systems   Constitutional: Positive for activity change.   HENT: Positive for drooling and mouth sores. Negative for dental problem, sore throat and trouble swallowing.    Respiratory: Negative.    Cardiovascular: Negative.    Gastrointestinal: Negative.    Musculoskeletal: Positive for arthralgias, gait problem and myalgias. Negative for joint swelling.   Skin: Positive for color change and wound.   Neurological: Negative for weakness and numbness.       Objective     Vitals:    05/06/19 1451   BP: 124/80   Pulse: 74   Temp: 98.4 °F (36.9 °C)   SpO2: 99%       Physical Exam   Constitutional: She appears well-developed and well-nourished. No distress.   HENT:   Head: Normocephalic.   Mouth/Throat: Oral lesions present.   Evidence of trauma to lip, swollen, crusting, traumatic ulcer noted lower lip inner lip lining   Cardiovascular: Normal rate, regular rhythm and normal heart sounds.    Pulmonary/Chest: Effort normal and breath sounds normal. No respiratory distress.   Musculoskeletal: She exhibits tenderness ( Tenderness left knee with range of motion, antalgic gait limping.). She exhibits no edema or deformity.   Neurological: She exhibits normal muscle tone. Coordination normal.   Skin: Skin is warm and dry. No rash noted. She is not diaphoretic. No erythema. No pallor.   Crusting noted on upper and lower lips externally with apthous ulcer noted lower lip   Psychiatric: She has a normal mood and affect. Her behavior is normal. Judgment and thought content normal.   Nursing note and vitals reviewed.      Assessment/Plan     Diagnoses and all orders for this visit:    Aphthous ulcer    Chronic pain of left knee    Arthritis of left knee    Other orders  -     fexofenadine (ALLEGRA) 180 MG tablet; Take 180 mg by mouth Daily. Take 180 mg by mouth Daily.  -     lidocaine viscous (XYLOCAINE) 2 % solution; Take 5 mL by mouth As Needed for Mild Pain .    Recommended irrigating/swishing 2-3 times daily with very dilute saline solution 1/4 teaspoon salt and 1 cup of warm water.  Refilled Percocet 10/325 1 4 times daily #120 with no refills discussed abuse potential's medication Claudio reviewed, appropriate, follow-up in office in 1 month and as needed.

## 2019-07-08 ENCOUNTER — OFFICE VISIT (OUTPATIENT)
Dept: FAMILY MEDICINE CLINIC | Facility: CLINIC | Age: 54
End: 2019-07-08

## 2019-07-08 VITALS
DIASTOLIC BLOOD PRESSURE: 78 MMHG | HEART RATE: 79 BPM | TEMPERATURE: 98.6 F | BODY MASS INDEX: 26.68 KG/M2 | WEIGHT: 166 LBS | HEIGHT: 66 IN | SYSTOLIC BLOOD PRESSURE: 138 MMHG | OXYGEN SATURATION: 98 %

## 2019-07-08 DIAGNOSIS — F41.9 ANXIETY: ICD-10-CM

## 2019-07-08 DIAGNOSIS — M25.562 CHRONIC PAIN OF LEFT KNEE: Primary | ICD-10-CM

## 2019-07-08 DIAGNOSIS — G89.29 CHRONIC PAIN OF LEFT KNEE: Primary | ICD-10-CM

## 2019-07-08 DIAGNOSIS — H92.03 OTALGIA OF BOTH EARS: ICD-10-CM

## 2019-07-08 DIAGNOSIS — K21.00 GASTROESOPHAGEAL REFLUX DISEASE WITH ESOPHAGITIS: ICD-10-CM

## 2019-07-08 PROCEDURE — 99214 OFFICE O/P EST MOD 30 MIN: CPT | Performed by: FAMILY MEDICINE

## 2019-07-08 RX ORDER — CEPHALEXIN 500 MG/1
500 CAPSULE ORAL 3 TIMES DAILY
Qty: 30 CAPSULE | Refills: 0 | Status: SHIPPED | OUTPATIENT
Start: 2019-07-08 | End: 2019-09-10

## 2019-07-08 RX ORDER — OXYCODONE AND ACETAMINOPHEN 10; 325 MG/1; MG/1
1 TABLET ORAL EVERY 6 HOURS PRN
Qty: 120 TABLET | Refills: 0 | Status: SHIPPED | OUTPATIENT
Start: 2019-07-08 | End: 2019-09-10 | Stop reason: SDUPTHER

## 2019-07-08 RX ORDER — ALPRAZOLAM 1 MG/1
1 TABLET ORAL 3 TIMES DAILY
Qty: 90 TABLET | Refills: 5 | Status: SHIPPED | OUTPATIENT
Start: 2019-07-08 | End: 2020-05-26 | Stop reason: SDUPTHER

## 2019-07-09 NOTE — PROGRESS NOTES
Subjective   Yuliana Kennedy is a 53 y.o. female    Chief Complaint    Chronic knee pain  Anxiety  GERD  Earache    History of Present Illness  Patient presents today for recheck regarding chronic knee pain, anxiety and GERD.  She is due for prescription refills.  Her AYAH is reviewed and is appropriate.  She is on chronic opioid therapy.  She has a new complaint today of earache that is bilateral but worse on the left than the right.  Denies any drainage.  She has not had any sinus symptoms lately.  She does have chronic allergies.    The following portions of the patient's history were reviewed and updated as appropriate: allergies, current medications, past social history and problem list    Review of Systems   Constitutional: Negative for appetite change, chills, fatigue and fever.   HENT: Positive for ear pain and postnasal drip. Negative for congestion, rhinorrhea, sore throat, trouble swallowing and voice change.    Eyes: Negative.    Respiratory: Negative for cough, chest tightness, shortness of breath and wheezing.    Cardiovascular: Negative.  Negative for chest pain and palpitations.   Gastrointestinal: Negative for abdominal pain, diarrhea, nausea and vomiting.   Endocrine: Negative.    Genitourinary: Negative.    Musculoskeletal: Positive for arthralgias and gait problem.   Skin: Negative for rash and wound.   Allergic/Immunologic: Negative.    Neurological: Negative for dizziness, tremors, weakness, light-headedness and headaches.   Hematological: Negative.  Negative for adenopathy.   Psychiatric/Behavioral: Positive for dysphoric mood and sleep disturbance. Negative for agitation, behavioral problems, confusion, decreased concentration and suicidal ideas. The patient is nervous/anxious.    All other systems reviewed and are negative.      Objective     Vitals:    07/08/19 1434   BP: 138/78   Pulse: 79   Temp: 98.6 °F (37 °C)   SpO2: 98%       Physical Exam   Constitutional: She is oriented to person,  place, and time. She appears well-developed and well-nourished. No distress.   HENT:   Head: Normocephalic and atraumatic.   Right Ear: Ear canal normal. Tympanic membrane is bulging. Tympanic membrane is not erythematous.   Left Ear: Ear canal normal. Tympanic membrane is bulging. Tympanic membrane is not erythematous.   Mouth/Throat: Oropharynx is clear and moist. No oropharyngeal exudate.   Eyes: Conjunctivae are normal. Pupils are equal, round, and reactive to light.   Neck: Normal range of motion. Neck supple. No thyromegaly present.   Cardiovascular: Normal rate, regular rhythm and normal heart sounds.   No murmur heard.  Pulmonary/Chest: Effort normal. No respiratory distress. She has decreased breath sounds. She has no wheezes.   Abdominal: Soft. Bowel sounds are normal. She exhibits no mass. There is no tenderness.   Musculoskeletal: She exhibits tenderness. She exhibits no edema.        Left knee: She exhibits deformity. She exhibits no swelling and no effusion. Tenderness found.   Neurological: She is alert and oriented to person, place, and time. She has normal reflexes. No cranial nerve deficit.   Skin: Skin is warm and dry. She is not diaphoretic.   Psychiatric: She has a normal mood and affect. Her behavior is normal.   Nursing note and vitals reviewed.      Assessment/Plan   Problem List Items Addressed This Visit        Digestive    GERD (gastroesophageal reflux disease)      Other Visit Diagnoses     Chronic pain of left knee    -  Primary    Relevant Medications    oxyCODONE-acetaminophen (PERCOCET)  MG per tablet    Anxiety        Relevant Medications    ALPRAZolam (XANAX) 1 MG tablet    Otalgia of both ears        Relevant Medications    cephalexin (KEFLEX) 500 MG capsule

## 2019-08-23 RX ORDER — OMEPRAZOLE 40 MG/1
40 CAPSULE, DELAYED RELEASE ORAL
Qty: 30 CAPSULE | Refills: 11 | Status: SHIPPED | OUTPATIENT
Start: 2019-08-23 | End: 2020-05-26 | Stop reason: SDUPTHER

## 2019-09-10 ENCOUNTER — OFFICE VISIT (OUTPATIENT)
Dept: FAMILY MEDICINE CLINIC | Facility: CLINIC | Age: 54
End: 2019-09-10

## 2019-09-10 VITALS
BODY MASS INDEX: 25.71 KG/M2 | WEIGHT: 160 LBS | DIASTOLIC BLOOD PRESSURE: 60 MMHG | TEMPERATURE: 98.5 F | HEIGHT: 66 IN | OXYGEN SATURATION: 97 % | SYSTOLIC BLOOD PRESSURE: 118 MMHG | HEART RATE: 77 BPM

## 2019-09-10 DIAGNOSIS — F41.9 ANXIETY: ICD-10-CM

## 2019-09-10 DIAGNOSIS — R41.3 MEMORY IMPAIRMENT: ICD-10-CM

## 2019-09-10 DIAGNOSIS — K64.4 EXTERNAL HEMORRHOIDS: ICD-10-CM

## 2019-09-10 DIAGNOSIS — M25.562 CHRONIC PAIN OF LEFT KNEE: Primary | ICD-10-CM

## 2019-09-10 DIAGNOSIS — F43.9 SITUATIONAL STRESS: ICD-10-CM

## 2019-09-10 DIAGNOSIS — G89.29 CHRONIC PAIN OF LEFT KNEE: Primary | ICD-10-CM

## 2019-09-10 PROCEDURE — 99214 OFFICE O/P EST MOD 30 MIN: CPT | Performed by: FAMILY MEDICINE

## 2019-09-10 RX ORDER — OXYCODONE AND ACETAMINOPHEN 10; 325 MG/1; MG/1
1 TABLET ORAL EVERY 6 HOURS PRN
Qty: 120 TABLET | Refills: 0 | Status: SHIPPED | OUTPATIENT
Start: 2019-09-10 | End: 2019-10-18 | Stop reason: SDUPTHER

## 2019-09-10 NOTE — PROGRESS NOTES
Subjective   Yuliana Kennedy is a 53 y.o. female    Chief Complaint    Chronic pain left knee  Anxiety  Situational stress  Memory loss  Hemorrhoids    History of Present Illness  Patient presents to the office today for a recheck related to her chronic pain of the left knee.  She is due for refill on her chronic opioid therapy.  Her Claudio is reviewed and is appropriate with her history.  Patient is requesting a refill on her hemorrhoid cream.  She is concerned about memory loss.  She says she cannot remember anything.  With further questioning she reveals that she has been under a great deal of stress.  She has just broken up with her significant other of 17 years, her son is  his wife and her other son is seeing the ex-wife of his brother.  This is obviously caused a great deal of stress and tension in the household.  Patient is advised that all of this stress is likely the cause for her memory issues.  She is compliant with her antianxiety agent and antidepressant therapy.  I have spent 25 minutes face-to-face with the patient with greater than 50% of the time spent counseling regarding stress and memory loss.    The following portions of the patient's history were reviewed and updated as appropriate: allergies, current medications, past social history and problem list    Review of Systems   Constitutional: Negative for appetite change, chills, fatigue and fever.   HENT: Negative for congestion, ear pain, postnasal drip, rhinorrhea, sore throat, trouble swallowing and voice change.    Eyes: Negative.    Respiratory: Negative for cough, chest tightness, shortness of breath and wheezing.    Cardiovascular: Negative.  Negative for chest pain and palpitations.   Gastrointestinal: Negative for abdominal pain, diarrhea, nausea and vomiting.   Endocrine: Negative.    Genitourinary: Negative.    Musculoskeletal: Positive for arthralgias and gait problem.   Skin: Negative for rash and wound.    Allergic/Immunologic: Negative.    Neurological: Negative for dizziness, tremors, weakness, light-headedness and headaches.   Hematological: Negative.  Negative for adenopathy.   Psychiatric/Behavioral: Positive for decreased concentration, dysphoric mood and sleep disturbance. Negative for agitation, behavioral problems, confusion and suicidal ideas. The patient is nervous/anxious.    All other systems reviewed and are negative.      Objective     Vitals:    09/10/19 1437   BP: 118/60   Pulse: 77   Temp: 98.5 °F (36.9 °C)   SpO2: 97%       Physical Exam   Constitutional: She is oriented to person, place, and time. She appears well-developed and well-nourished. No distress.   HENT:   Head: Normocephalic and atraumatic.   Right Ear: Ear canal normal. Tympanic membrane is bulging. Tympanic membrane is not erythematous.   Left Ear: Ear canal normal. Tympanic membrane is bulging. Tympanic membrane is not erythematous.   Mouth/Throat: Oropharynx is clear and moist. No oropharyngeal exudate.   Eyes: Conjunctivae are normal. Pupils are equal, round, and reactive to light.   Neck: Normal range of motion. Neck supple. No thyromegaly present.   Cardiovascular: Normal rate, regular rhythm and normal heart sounds.   No murmur heard.  Pulmonary/Chest: Effort normal. No respiratory distress. She has decreased breath sounds. She has no wheezes.   Abdominal: Soft. Bowel sounds are normal. She exhibits no mass. There is no tenderness.   Musculoskeletal: She exhibits tenderness. She exhibits no edema.        Left knee: She exhibits deformity. She exhibits no swelling and no effusion. Tenderness found.   Neurological: She is alert and oriented to person, place, and time. She has normal reflexes. No cranial nerve deficit.   Skin: Skin is warm and dry. She is not diaphoretic.   Psychiatric: Her mood appears anxious. Her speech is rapid and/or pressured. She is agitated. She exhibits a depressed mood.   Nursing note and vitals  reviewed.      Assessment/Plan   Problem List Items Addressed This Visit     None      Visit Diagnoses     Chronic pain of left knee    -  Primary    Relevant Medications    oxyCODONE-acetaminophen (PERCOCET)  MG per tablet    Anxiety        Memory impairment        Situational stress            Continue antianxiety and antidepressant medications without changes.  Recheck in 1 month.  Patient refuses referral for counseling.

## 2019-09-26 ENCOUNTER — TELEPHONE (OUTPATIENT)
Dept: FAMILY MEDICINE CLINIC | Facility: CLINIC | Age: 54
End: 2019-09-26

## 2019-09-26 RX ORDER — TRIAMCINOLONE ACETONIDE 5 MG/G
CREAM TOPICAL 3 TIMES DAILY
Qty: 30 G | Refills: 2 | Status: SHIPPED | OUTPATIENT
Start: 2019-09-26 | End: 2020-05-26

## 2019-09-26 NOTE — TELEPHONE ENCOUNTER
----- Message from Nicolas Bland sent at 9/26/2019 11:13 AM EDT -----  Contact: PATIENT  PATIENT STATES SHE HAS BROKEN OUT IN A RASH ON HER ARMS.  STATES DR. MORALES IS AWARE THAT SHE GETS RASHES PERIODICALLY.  SHE WANTS US TO CALL IN A CREAM FOR HER RASH.

## 2019-10-17 RX ORDER — FEXOFENADINE HCL 180 MG/1
180 TABLET ORAL DAILY
Qty: 30 TABLET | Refills: 11 | OUTPATIENT
Start: 2019-10-17

## 2019-10-18 ENCOUNTER — OFFICE VISIT (OUTPATIENT)
Dept: FAMILY MEDICINE CLINIC | Facility: CLINIC | Age: 54
End: 2019-10-18

## 2019-10-18 VITALS
BODY MASS INDEX: 24.43 KG/M2 | OXYGEN SATURATION: 98 % | DIASTOLIC BLOOD PRESSURE: 76 MMHG | SYSTOLIC BLOOD PRESSURE: 136 MMHG | HEIGHT: 66 IN | TEMPERATURE: 97 F | WEIGHT: 152 LBS | HEART RATE: 73 BPM

## 2019-10-18 DIAGNOSIS — F41.9 ANXIETY: ICD-10-CM

## 2019-10-18 DIAGNOSIS — F43.9 SITUATIONAL STRESS: ICD-10-CM

## 2019-10-18 DIAGNOSIS — G89.29 CHRONIC PAIN OF LEFT KNEE: Primary | ICD-10-CM

## 2019-10-18 DIAGNOSIS — M25.562 CHRONIC PAIN OF LEFT KNEE: Primary | ICD-10-CM

## 2019-10-18 PROCEDURE — 99214 OFFICE O/P EST MOD 30 MIN: CPT | Performed by: FAMILY MEDICINE

## 2019-10-18 RX ORDER — OXYCODONE AND ACETAMINOPHEN 10; 325 MG/1; MG/1
1 TABLET ORAL EVERY 6 HOURS PRN
Qty: 120 TABLET | Refills: 0 | Status: SHIPPED | OUTPATIENT
Start: 2019-10-18 | End: 2020-02-07 | Stop reason: SDUPTHER

## 2019-10-18 RX ORDER — FAMOTIDINE 40 MG/1
TABLET, FILM COATED ORAL
Refills: 0 | COMMUNITY
Start: 2019-09-27 | End: 2020-05-26

## 2019-11-21 ENCOUNTER — TELEPHONE (OUTPATIENT)
Dept: FAMILY MEDICINE CLINIC | Facility: CLINIC | Age: 54
End: 2019-11-21

## 2019-11-21 NOTE — TELEPHONE ENCOUNTER
Pt called and l/m on the HUB v/m -  Could not leave message - no v/m set up -  She was calling to set up an appt

## 2019-12-09 ENCOUNTER — OFFICE VISIT (OUTPATIENT)
Dept: FAMILY MEDICINE CLINIC | Facility: CLINIC | Age: 54
End: 2019-12-09

## 2019-12-09 VITALS
DIASTOLIC BLOOD PRESSURE: 80 MMHG | HEART RATE: 84 BPM | SYSTOLIC BLOOD PRESSURE: 134 MMHG | WEIGHT: 157.2 LBS | HEIGHT: 66 IN | BODY MASS INDEX: 25.26 KG/M2 | OXYGEN SATURATION: 97 % | TEMPERATURE: 98 F

## 2019-12-09 DIAGNOSIS — G89.29 CHRONIC PAIN OF LEFT KNEE: ICD-10-CM

## 2019-12-09 DIAGNOSIS — J01.91 ACUTE RECURRENT SINUSITIS, UNSPECIFIED LOCATION: Primary | ICD-10-CM

## 2019-12-09 DIAGNOSIS — M25.562 CHRONIC PAIN OF LEFT KNEE: ICD-10-CM

## 2019-12-09 DIAGNOSIS — M17.12 ARTHRITIS OF LEFT KNEE: ICD-10-CM

## 2019-12-09 DIAGNOSIS — F41.1 GENERALIZED ANXIETY DISORDER: ICD-10-CM

## 2019-12-09 PROCEDURE — 99214 OFFICE O/P EST MOD 30 MIN: CPT | Performed by: PHYSICIAN ASSISTANT

## 2019-12-09 RX ORDER — CEFUROXIME AXETIL 250 MG/1
250 TABLET ORAL 2 TIMES DAILY
Qty: 20 TABLET | Refills: 0 | Status: SHIPPED | OUTPATIENT
Start: 2019-12-09 | End: 2020-05-26

## 2019-12-09 NOTE — PROGRESS NOTES
Subjective   Yuliana Kennedy is a 54 y.o. female  Anxiety (Follow up on anxiety, req refills on alprazolam 1mg TID ) and Knee Pain (Req refills on Oxycodone 10/325 1 po QID for Knee pain )      History of Present Illness  Patient comes in today for 3 separate issues one is for follow-up on chronic knee pain associated with arthritis, due for refills on opioid therapy, here for follow-up on generalized anxiety disorder due for refills on Xanax and also having a new symptom of fatigue, left ear pain, sore throat, sinus pressure for the past 3 days with increased fatigue.  No fever.  She is experiencing a headache.  She states she has struggled with recurrent sinus problems for the past year.  Patient's chronic knee pain continues to be debilitating without opioid medication.  She states that without her Percocet she is unable to carry out daily activities.  By taking this medication 4 times daily she is able to carry out ADLs with tolerable pain, pain level typically at 4 out of 10 with medication.  Anxiety is well controlled on Xanax at this time.  Denies adverse effects from either medication.  The following portions of the patient's history were reviewed and updated as appropriate: allergies, current medications, past social history and problem list    Review of Systems   Constitutional: Negative for appetite change, chills, fatigue and fever.   HENT: Positive for congestion, ear pain, postnasal drip, rhinorrhea, sinus pressure and sore throat.    Eyes: Negative for pain.   Respiratory: Negative for cough, chest tightness and shortness of breath.    Cardiovascular: Negative.    Gastrointestinal: Negative for abdominal pain, diarrhea and nausea.   Musculoskeletal: Positive for arthralgias, gait problem and myalgias. Negative for joint swelling.   Skin: Negative.    Neurological: Positive for headaches. Negative for dizziness, tremors, weakness and light-headedness.   Hematological: Negative for adenopathy.    Psychiatric/Behavioral: Negative for agitation, behavioral problems, confusion, decreased concentration, dysphoric mood, self-injury, sleep disturbance and suicidal ideas. The patient is nervous/anxious.        Objective     Vitals:    12/09/19 1346   BP: 134/80   Pulse: 84   Temp: 98 °F (36.7 °C)   SpO2: 97%       Physical Exam   Constitutional: She is oriented to person, place, and time. She appears well-developed and well-nourished. No distress.   HENT:   Head: Normocephalic and atraumatic.   Right Ear: Tympanic membrane and ear canal normal.   Left Ear: Tympanic membrane and ear canal normal.   Nose: Mucosal edema, rhinorrhea and sinus tenderness present. Right sinus exhibits maxillary sinus tenderness and frontal sinus tenderness. Left sinus exhibits maxillary sinus tenderness and frontal sinus tenderness.   Mouth/Throat: Oropharynx is clear and moist. No oropharyngeal exudate.   Eyes: Pupils are equal, round, and reactive to light.   Neck: No thyroid mass and no thyromegaly present.   Cardiovascular: Normal rate, regular rhythm and normal heart sounds.   Pulmonary/Chest: Effort normal and breath sounds normal.   Musculoskeletal: She exhibits deformity ( Degenerative changes noted in left knee). She exhibits no edema or tenderness.   Decreased range of motion left knee consistent with degenerative joint disease   Neurological: She is alert and oriented to person, place, and time. No cranial nerve deficit. Coordination normal.   Skin: Skin is warm and dry. No rash noted. She is not diaphoretic. No erythema. No pallor.   Psychiatric: She has a normal mood and affect. Her speech is normal and behavior is normal. Judgment and thought content normal. Her affect is not angry and not inappropriate. Cognition and memory are normal. She does not exhibit a depressed mood. She is attentive.   Nursing note and vitals reviewed.      Assessment/Plan     Diagnoses and all orders for this visit:    Acute recurrent sinusitis,  unspecified location    Chronic pain of left knee    Generalized anxiety disorder    Arthritis of left knee    Other orders  -     cefuroxime (CEFTIN) 250 MG tablet; Take 1 tablet by mouth 2 (Two) Times a Day.    Prescription written for Percocet 10/320 514 times daily for chronic knee pain associated with arthritis #120 with no refills.  Refilled Xanax 1 mg 1 3 times daily #90 with 5 refills.  Discussed abuse potential and controlled substance status of both medications, follow-up in 1 month for recheck of chronic knee pain.

## 2020-01-10 ENCOUNTER — TELEPHONE (OUTPATIENT)
Dept: FAMILY MEDICINE CLINIC | Facility: CLINIC | Age: 55
End: 2020-01-10

## 2020-01-10 DIAGNOSIS — F41.9 ANXIETY: ICD-10-CM

## 2020-01-10 DIAGNOSIS — F32.A DEPRESSION, UNSPECIFIED DEPRESSION TYPE: ICD-10-CM

## 2020-01-10 RX ORDER — SERTRALINE HYDROCHLORIDE 100 MG/1
100 TABLET, FILM COATED ORAL DAILY
Qty: 30 TABLET | Refills: 11 | Status: SHIPPED | OUTPATIENT
Start: 2020-01-10 | End: 2020-05-26 | Stop reason: SDUPTHER

## 2020-01-10 RX ORDER — AMOXICILLIN AND CLAVULANATE POTASSIUM 875; 125 MG/1; MG/1
1 TABLET, FILM COATED ORAL 2 TIMES DAILY
Qty: 20 TABLET | Refills: 0 | Status: SHIPPED | OUTPATIENT
Start: 2020-01-10 | End: 2020-05-26

## 2020-01-10 NOTE — TELEPHONE ENCOUNTER
Pt called and stated that she was still experiencing symptoms of a sinus infection. Pt is requesting an antibiotic be sent to [facility] in [place]. Pt call back [phone]

## 2020-02-07 ENCOUNTER — OFFICE VISIT (OUTPATIENT)
Dept: FAMILY MEDICINE CLINIC | Facility: CLINIC | Age: 55
End: 2020-02-07

## 2020-02-07 VITALS
SYSTOLIC BLOOD PRESSURE: 108 MMHG | OXYGEN SATURATION: 98 % | TEMPERATURE: 98.2 F | WEIGHT: 164 LBS | BODY MASS INDEX: 26.36 KG/M2 | HEIGHT: 66 IN | HEART RATE: 78 BPM | DIASTOLIC BLOOD PRESSURE: 60 MMHG

## 2020-02-07 DIAGNOSIS — G89.29 CHRONIC PAIN OF LEFT KNEE: Primary | ICD-10-CM

## 2020-02-07 DIAGNOSIS — M54.50 CHRONIC LOW BACK PAIN WITHOUT SCIATICA, UNSPECIFIED BACK PAIN LATERALITY: ICD-10-CM

## 2020-02-07 DIAGNOSIS — G89.29 CHRONIC LOW BACK PAIN WITHOUT SCIATICA, UNSPECIFIED BACK PAIN LATERALITY: ICD-10-CM

## 2020-02-07 DIAGNOSIS — H92.03 OTALGIA OF BOTH EARS: ICD-10-CM

## 2020-02-07 DIAGNOSIS — M25.562 CHRONIC PAIN OF LEFT KNEE: Primary | ICD-10-CM

## 2020-02-07 PROCEDURE — 99213 OFFICE O/P EST LOW 20 MIN: CPT | Performed by: FAMILY MEDICINE

## 2020-02-07 RX ORDER — OXYCODONE AND ACETAMINOPHEN 10; 325 MG/1; MG/1
1 TABLET ORAL EVERY 6 HOURS PRN
Qty: 120 TABLET | Refills: 0 | Status: SHIPPED | OUTPATIENT
Start: 2020-02-07 | End: 2020-04-02 | Stop reason: SDUPTHER

## 2020-02-09 NOTE — PROGRESS NOTES
Subjective   Yuliana Kennedy is a 54 y.o. female    Chief Complaint    Chronic knee pain  Chronic back pain  Ear drainage    History of Present Illness  Patient presents today for chronic knee pain.  This is related to previous knee injury and subsequent surgical intervention.  She is also having some chronic issues with low back pain although this does seem to be somewhat better.  She has had some drainage from her ears but is not having ear pain.  She was told previously that she had earwax.    The following portions of the patient's history were reviewed and updated as appropriate: allergies, current medications, past social history and problem list    Review of Systems   Constitutional: Negative for appetite change, chills, fatigue and fever.   HENT: Negative for congestion, ear pain, postnasal drip, rhinorrhea, sore throat, trouble swallowing and voice change.    Eyes: Negative.    Respiratory: Negative for cough, chest tightness, shortness of breath and wheezing.    Cardiovascular: Negative.  Negative for chest pain and palpitations.   Gastrointestinal: Negative for abdominal pain, diarrhea, nausea and vomiting.   Endocrine: Negative.    Genitourinary: Negative.    Musculoskeletal: Positive for arthralgias and gait problem.   Skin: Negative for rash and wound.   Allergic/Immunologic: Negative.    Neurological: Negative for dizziness, tremors, weakness, light-headedness and headaches.   Hematological: Negative.  Negative for adenopathy.   Psychiatric/Behavioral: Positive for decreased concentration, dysphoric mood and sleep disturbance. Negative for agitation, behavioral problems, confusion and suicidal ideas. The patient is nervous/anxious.    All other systems reviewed and are negative.      Objective     Vitals:    02/07/20 1417   BP: 108/60   Pulse: 78   Temp: 98.2 °F (36.8 °C)   SpO2: 98%       Physical Exam   Constitutional: She is oriented to person, place, and time. She appears well-developed and  well-nourished. No distress.   HENT:   Head: Normocephalic and atraumatic.   Right Ear: External ear and ear canal normal.   Left Ear: External ear and ear canal normal.   Mouth/Throat: Oropharynx is clear and moist. No oropharyngeal exudate.   Eyes: Pupils are equal, round, and reactive to light. Conjunctivae are normal.   Neck: Normal range of motion. Neck supple. No thyromegaly present.   Cardiovascular: Normal rate, regular rhythm and normal heart sounds.   No murmur heard.  Pulmonary/Chest: Effort normal. No respiratory distress. She has decreased breath sounds. She has no wheezes.   Abdominal: Soft. Bowel sounds are normal. She exhibits no mass. There is no tenderness.   Musculoskeletal: She exhibits tenderness. She exhibits no edema.        Left knee: She exhibits deformity. She exhibits no swelling and no effusion. Tenderness found.   Neurological: She is alert and oriented to person, place, and time. She has normal reflexes. No cranial nerve deficit.   Skin: Skin is warm and dry. She is not diaphoretic.   Psychiatric: Her mood appears anxious. Her speech is rapid and/or pressured. She is agitated. She exhibits a depressed mood.   Nursing note and vitals reviewed.      Assessment/Plan   Problem List Items Addressed This Visit     None      Visit Diagnoses     Chronic pain of left knee    -  Primary    Relevant Medications    oxyCODONE-acetaminophen (PERCOCET)  MG per tablet    Chronic low back pain without sciatica, unspecified back pain laterality        Relevant Medications    oxyCODONE-acetaminophen (PERCOCET)  MG per tablet    Otalgia of both ears

## 2020-03-31 ENCOUNTER — TELEPHONE (OUTPATIENT)
Dept: FAMILY MEDICINE CLINIC | Facility: CLINIC | Age: 55
End: 2020-03-31

## 2020-03-31 DIAGNOSIS — G89.29 CHRONIC PAIN OF LEFT KNEE: ICD-10-CM

## 2020-03-31 DIAGNOSIS — M25.562 CHRONIC PAIN OF LEFT KNEE: ICD-10-CM

## 2020-03-31 DIAGNOSIS — M54.50 CHRONIC LOW BACK PAIN WITHOUT SCIATICA, UNSPECIFIED BACK PAIN LATERALITY: ICD-10-CM

## 2020-03-31 DIAGNOSIS — G89.29 CHRONIC LOW BACK PAIN WITHOUT SCIATICA, UNSPECIFIED BACK PAIN LATERALITY: ICD-10-CM

## 2020-03-31 RX ORDER — OXYCODONE AND ACETAMINOPHEN 10; 325 MG/1; MG/1
1 TABLET ORAL EVERY 6 HOURS PRN
Qty: 120 TABLET | Refills: 0 | OUTPATIENT
Start: 2020-03-31

## 2020-03-31 NOTE — TELEPHONE ENCOUNTER
PIERO with MsKuldip Jonas that was listed on her chart to make an appt.  Her phone was Disconnected

## 2020-04-02 ENCOUNTER — OFFICE VISIT (OUTPATIENT)
Dept: FAMILY MEDICINE CLINIC | Facility: CLINIC | Age: 55
End: 2020-04-02

## 2020-04-02 VITALS
WEIGHT: 166 LBS | TEMPERATURE: 97.8 F | BODY MASS INDEX: 26.68 KG/M2 | HEIGHT: 66 IN | HEART RATE: 83 BPM | OXYGEN SATURATION: 97 % | DIASTOLIC BLOOD PRESSURE: 78 MMHG | SYSTOLIC BLOOD PRESSURE: 106 MMHG

## 2020-04-02 DIAGNOSIS — M54.50 CHRONIC LOW BACK PAIN WITHOUT SCIATICA, UNSPECIFIED BACK PAIN LATERALITY: ICD-10-CM

## 2020-04-02 DIAGNOSIS — M25.562 CHRONIC PAIN OF LEFT KNEE: Primary | ICD-10-CM

## 2020-04-02 DIAGNOSIS — J30.9 ALLERGIC RHINITIS, UNSPECIFIED SEASONALITY, UNSPECIFIED TRIGGER: ICD-10-CM

## 2020-04-02 DIAGNOSIS — G89.29 CHRONIC LOW BACK PAIN WITHOUT SCIATICA, UNSPECIFIED BACK PAIN LATERALITY: ICD-10-CM

## 2020-04-02 DIAGNOSIS — G89.29 CHRONIC PAIN OF LEFT KNEE: Primary | ICD-10-CM

## 2020-04-02 PROCEDURE — 99213 OFFICE O/P EST LOW 20 MIN: CPT | Performed by: FAMILY MEDICINE

## 2020-04-02 RX ORDER — OXYCODONE AND ACETAMINOPHEN 10; 325 MG/1; MG/1
1 TABLET ORAL EVERY 6 HOURS PRN
Qty: 120 TABLET | Refills: 0 | Status: SHIPPED | OUTPATIENT
Start: 2020-04-02 | End: 2020-05-26 | Stop reason: SDUPTHER

## 2020-04-07 NOTE — PROGRESS NOTES
"Subjective   Yuliana Kennedy is a 54 y.o. female    Chief Complaint    Chronic knee pain  Chronic low back pain    History of Present Illness  Patient presents today and is due for refill of her oxycodone for her chronic knee and chronic back pain.  Previous traumatic knee injury with subsequent surgery.  She has been told that she will eventually require total knee replacement.  She also has chronic low back pain without radicular features.  Her Claudio is reviewed and is appropriate regarding her prescription opioid use.  She has no other acute complaints today.  She can tell she says that her allergies are \"kicking in\".  She admits that she is quite alarmed regarding the coronavirus  pandemic and is anxious to get out of the office.      The following portions of the patient's history were reviewed and updated as appropriate: allergies, current medications, past social history and problem list    Review of Systems   Constitutional: Negative.  Negative for appetite change, chills, fatigue and fever.   HENT: Negative for congestion, ear pain, postnasal drip, rhinorrhea, sore throat, trouble swallowing and voice change.    Eyes: Negative.    Respiratory: Negative.  Negative for cough, chest tightness, shortness of breath and wheezing.    Cardiovascular: Negative.  Negative for chest pain and palpitations.   Gastrointestinal: Negative.  Negative for abdominal pain, diarrhea, nausea and vomiting.   Endocrine: Negative.    Genitourinary: Negative.    Musculoskeletal: Positive for arthralgias, back pain and gait problem. Negative for myalgias.   Skin: Negative for rash and wound.   Allergic/Immunologic: Negative.    Neurological: Negative for dizziness, tremors, weakness, light-headedness, numbness and headaches.   Hematological: Negative.  Negative for adenopathy.   Psychiatric/Behavioral: Positive for decreased concentration, dysphoric mood and sleep disturbance. Negative for agitation, behavioral problems, confusion " and suicidal ideas. The patient is nervous/anxious.    All other systems reviewed and are negative.      Objective     Vitals:    04/02/20 1302   BP: 106/78   Pulse: 83   Temp: 97.8 °F (36.6 °C)   SpO2: 97%       Physical Exam   Constitutional: She is oriented to person, place, and time. She appears well-developed and well-nourished. No distress.   HENT:   Head: Normocephalic and atraumatic.   Right Ear: External ear and ear canal normal.   Left Ear: External ear and ear canal normal.   Mouth/Throat: Oropharynx is clear and moist. No oropharyngeal exudate.   Eyes: Pupils are equal, round, and reactive to light. Conjunctivae are normal.   Neck: Normal range of motion. Neck supple. No thyromegaly present.   Cardiovascular: Normal rate, regular rhythm and normal heart sounds.   No murmur heard.  Pulmonary/Chest: Effort normal. No respiratory distress. She has decreased breath sounds. She has no wheezes.   Abdominal: Soft. Bowel sounds are normal. She exhibits no mass. There is no tenderness.   Musculoskeletal: She exhibits tenderness. She exhibits no edema.        Left knee: She exhibits deformity. She exhibits no swelling and no effusion. Tenderness found.        Lumbar back: She exhibits decreased range of motion, tenderness, bony tenderness and pain. She exhibits no swelling, no deformity and no spasm.   Neurological: She is alert and oriented to person, place, and time. She has normal reflexes. No cranial nerve deficit.   Skin: Skin is warm and dry. She is not diaphoretic.   Psychiatric: Her mood appears anxious. Her speech is rapid and/or pressured. She is agitated. She exhibits a depressed mood.   Nursing note and vitals reviewed.      Assessment/Plan   Problem List Items Addressed This Visit        Respiratory    Allergic rhinitis      Other Visit Diagnoses     Chronic pain of left knee    -  Primary    Relevant Medications    oxyCODONE-acetaminophen (PERCOCET)  MG per tablet    Chronic low back pain  without sciatica, unspecified back pain laterality        Relevant Medications    oxyCODONE-acetaminophen (PERCOCET)  MG per tablet

## 2020-05-26 ENCOUNTER — OFFICE VISIT (OUTPATIENT)
Dept: FAMILY MEDICINE CLINIC | Facility: CLINIC | Age: 55
End: 2020-05-26

## 2020-05-26 VITALS
TEMPERATURE: 98.6 F | WEIGHT: 158 LBS | SYSTOLIC BLOOD PRESSURE: 128 MMHG | BODY MASS INDEX: 25.39 KG/M2 | HEART RATE: 76 BPM | DIASTOLIC BLOOD PRESSURE: 78 MMHG | OXYGEN SATURATION: 97 % | HEIGHT: 66 IN

## 2020-05-26 DIAGNOSIS — G89.29 CHRONIC PAIN OF LEFT KNEE: ICD-10-CM

## 2020-05-26 DIAGNOSIS — K21.00 GASTROESOPHAGEAL REFLUX DISEASE WITH ESOPHAGITIS: ICD-10-CM

## 2020-05-26 DIAGNOSIS — F41.9 ANXIETY: ICD-10-CM

## 2020-05-26 DIAGNOSIS — K64.4 EXTERNAL HEMORRHOIDS: ICD-10-CM

## 2020-05-26 DIAGNOSIS — J30.9 ALLERGIC RHINITIS, UNSPECIFIED SEASONALITY, UNSPECIFIED TRIGGER: Primary | ICD-10-CM

## 2020-05-26 DIAGNOSIS — G89.29 CHRONIC LOW BACK PAIN WITHOUT SCIATICA, UNSPECIFIED BACK PAIN LATERALITY: ICD-10-CM

## 2020-05-26 DIAGNOSIS — M54.50 CHRONIC LOW BACK PAIN WITHOUT SCIATICA, UNSPECIFIED BACK PAIN LATERALITY: ICD-10-CM

## 2020-05-26 DIAGNOSIS — F32.A DEPRESSION, UNSPECIFIED DEPRESSION TYPE: ICD-10-CM

## 2020-05-26 DIAGNOSIS — M25.562 CHRONIC PAIN OF LEFT KNEE: ICD-10-CM

## 2020-05-26 PROCEDURE — 99214 OFFICE O/P EST MOD 30 MIN: CPT | Performed by: FAMILY MEDICINE

## 2020-05-26 RX ORDER — ALPRAZOLAM 1 MG/1
1 TABLET ORAL 3 TIMES DAILY
Qty: 90 TABLET | Refills: 5 | Status: SHIPPED | OUTPATIENT
Start: 2020-05-26 | End: 2020-10-21 | Stop reason: SDUPTHER

## 2020-05-26 RX ORDER — FEXOFENADINE HCL 180 MG/1
180 TABLET ORAL DAILY
Qty: 30 TABLET | Refills: 11 | Status: SHIPPED | OUTPATIENT
Start: 2020-05-26 | End: 2021-06-14 | Stop reason: SDUPTHER

## 2020-05-26 RX ORDER — CETIRIZINE HYDROCHLORIDE, PSEUDOEPHEDRINE HYDROCHLORIDE 5; 120 MG/1; MG/1
1 TABLET, FILM COATED, EXTENDED RELEASE ORAL 2 TIMES DAILY
COMMUNITY
End: 2020-05-26

## 2020-05-26 RX ORDER — OXYCODONE AND ACETAMINOPHEN 10; 325 MG/1; MG/1
1 TABLET ORAL EVERY 6 HOURS PRN
Qty: 120 TABLET | Refills: 0 | Status: SHIPPED | OUTPATIENT
Start: 2020-05-26 | End: 2020-07-08 | Stop reason: SDUPTHER

## 2020-05-26 RX ORDER — OMEPRAZOLE 40 MG/1
40 CAPSULE, DELAYED RELEASE ORAL
Qty: 30 CAPSULE | Refills: 11 | Status: SHIPPED | OUTPATIENT
Start: 2020-05-26 | End: 2021-06-14 | Stop reason: SDUPTHER

## 2020-05-26 RX ORDER — SERTRALINE HYDROCHLORIDE 100 MG/1
100 TABLET, FILM COATED ORAL DAILY
Qty: 30 TABLET | Refills: 11 | Status: SHIPPED | OUTPATIENT
Start: 2020-05-26 | End: 2021-06-14 | Stop reason: SDUPTHER

## 2020-05-27 ENCOUNTER — TELEPHONE (OUTPATIENT)
Dept: FAMILY MEDICINE CLINIC | Facility: CLINIC | Age: 55
End: 2020-05-27

## 2020-05-27 NOTE — TELEPHONE ENCOUNTER
PATIENT STATES SHE WAS SEEN YESTERDAY AND DR SIGALA STATES HE WOULD WRITE HER A PRESCRIPTION FOR A Z-PAC.  PATIENT DOESN'T KNOW IF SHE LOST IT OR WHAT BUT IS REQUESTING TO  TOMORROW.    PLEASE CALL AND ADVISE [phone]

## 2020-05-28 RX ORDER — AZITHROMYCIN 250 MG/1
TABLET, FILM COATED ORAL
Qty: 6 TABLET | Refills: 0 | Status: SHIPPED | OUTPATIENT
Start: 2020-05-28 | End: 2020-08-24

## 2020-06-28 NOTE — PROGRESS NOTES
Subjective   Yuliana Kennedy is a 54 y.o. female    Chief Complaint    Chronic left knee pain  GERD  Anxiety    History of Present Illness  Patient presents today with chronic left knee pain secondary to injury.  Patient has become opioid dependent due to her pain.  Pain medication allows her to complete her activities of daily living with limited discomfort.  Her Claudio is reviewed and is appropriate.  Patient needs multiple medication refills today.  Other ongoing medical problems include hemorrhoids, GERD, anxiety, depression and allergic rhinitis.  Problems appear to be stable and treated with her current medications.  She has no acute symptoms today.  She does report that allergies have been particularly bad this spring.    The following portions of the patient's history were reviewed and updated as appropriate: allergies, current medications, past social history and problem list    Review of Systems   Constitutional: Negative for appetite change, chills, fatigue, fever and unexpected weight change.   HENT: Positive for congestion, postnasal drip, rhinorrhea, sneezing and sore throat. Negative for ear pain, hearing loss, sinus pressure and trouble swallowing.    Eyes: Positive for itching.   Respiratory: Negative for cough, chest tightness and shortness of breath.    Cardiovascular: Negative for chest pain.   Gastrointestinal: Negative for abdominal distention, abdominal pain, diarrhea and nausea.        Patient experiencing heartburn/acid reflux     Genitourinary: Negative.    Musculoskeletal: Positive for arthralgias, gait problem and myalgias.   Neurological: Negative for dizziness, tremors, weakness, light-headedness and headaches.   Psychiatric/Behavioral: Positive for dysphoric mood and sleep disturbance. Negative for agitation, behavioral problems, confusion, decreased concentration and suicidal ideas. The patient is nervous/anxious.        Objective     Vitals:    05/26/20 1448   BP: 128/78   Pulse: 76    Temp: 98.6 °F (37 °C)   SpO2: 97%       Physical Exam   Constitutional: She is oriented to person, place, and time. She appears well-developed and well-nourished. No distress.   HENT:   Head: Normocephalic and atraumatic.   Right Ear: External ear normal.   Left Ear: External ear normal.   Nose: Nose normal.   Mouth/Throat: Oropharynx is clear and moist.   Eyes: Pupils are equal, round, and reactive to light. Conjunctivae are normal.   Neck: Neck supple.   Cardiovascular: Normal rate, regular rhythm and normal heart sounds.   Pulmonary/Chest: Effort normal and breath sounds normal.   Abdominal: Soft. There is no tenderness.   Musculoskeletal:        Left knee: She exhibits decreased range of motion and deformity. Tenderness found.   Lymphadenopathy:     She has no cervical adenopathy.   Neurological: She is alert and oriented to person, place, and time.   Skin: Skin is warm and dry. No rash noted. She is not diaphoretic.   Psychiatric: She has a normal mood and affect. Her behavior is normal.   Nursing note and vitals reviewed.      Assessment/Plan   Problem List Items Addressed This Visit        Respiratory    Allergic rhinitis - Primary    Relevant Medications    fexofenadine (ALLEGRA) 180 MG tablet    azithromycin (Zithromax Z-Myron) 250 MG tablet       Digestive    GERD (gastroesophageal reflux disease)    Relevant Medications    omeprazole (priLOSEC) 40 MG capsule      Other Visit Diagnoses     Anxiety        Relevant Medications    ALPRAZolam (XANAX) 1 MG tablet    sertraline (ZOLOFT) 100 MG tablet    Chronic pain of left knee        Relevant Medications    oxyCODONE-acetaminophen (PERCOCET)  MG per tablet    Chronic low back pain without sciatica, unspecified back pain laterality        Relevant Medications    oxyCODONE-acetaminophen (PERCOCET)  MG per tablet    Depression, unspecified depression type        Relevant Medications    ALPRAZolam (XANAX) 1 MG tablet    sertraline (ZOLOFT) 100 MG  tablet    External hemorrhoids          Problem #7 continue Anusol HC cream 2.5% as needed

## 2020-07-07 ENCOUNTER — TELEPHONE (OUTPATIENT)
Dept: FAMILY MEDICINE CLINIC | Facility: CLINIC | Age: 55
End: 2020-07-07

## 2020-07-07 RX ORDER — AMOXICILLIN AND CLAVULANATE POTASSIUM 875; 125 MG/1; MG/1
1 TABLET, FILM COATED ORAL 2 TIMES DAILY
Qty: 20 TABLET | Refills: 0 | Status: SHIPPED | OUTPATIENT
Start: 2020-07-07 | End: 2020-08-21

## 2020-07-07 NOTE — TELEPHONE ENCOUNTER
PATIENT CALLING IN TO REPORT A POSSIBLE EAR INFECTION. THIS IS A CONDITION SHE NORMALLY GETS AND TAKES AN ANTIBIOTIC FOR.  HER SYMPTOMS ARE LIGHTHEADEDNESS, DIZZYNESS, NAUSEA, THROAT AND EAR SORE.    PLEASE HAVE DR MORALES SEND IN AN ANTIBIOTIC TO:    [address] - [address] [facility] [phone] [facility] [phone]

## 2020-07-08 ENCOUNTER — TELEMEDICINE (OUTPATIENT)
Dept: FAMILY MEDICINE CLINIC | Facility: CLINIC | Age: 55
End: 2020-07-08

## 2020-07-08 DIAGNOSIS — M25.562 CHRONIC PAIN OF LEFT KNEE: Primary | ICD-10-CM

## 2020-07-08 DIAGNOSIS — G89.29 CHRONIC LOW BACK PAIN WITHOUT SCIATICA, UNSPECIFIED BACK PAIN LATERALITY: ICD-10-CM

## 2020-07-08 DIAGNOSIS — H60.90 RECURRENT OTITIS EXTERNA, UNSPECIFIED LATERALITY: ICD-10-CM

## 2020-07-08 DIAGNOSIS — M54.50 CHRONIC LOW BACK PAIN WITHOUT SCIATICA, UNSPECIFIED BACK PAIN LATERALITY: ICD-10-CM

## 2020-07-08 DIAGNOSIS — G89.29 CHRONIC PAIN OF LEFT KNEE: Primary | ICD-10-CM

## 2020-07-08 PROCEDURE — 99213 OFFICE O/P EST LOW 20 MIN: CPT | Performed by: FAMILY MEDICINE

## 2020-07-08 RX ORDER — OXYCODONE AND ACETAMINOPHEN 10; 325 MG/1; MG/1
1 TABLET ORAL EVERY 6 HOURS PRN
Qty: 120 TABLET | Refills: 0 | Status: SHIPPED | OUTPATIENT
Start: 2020-07-08 | End: 2020-08-24 | Stop reason: SDUPTHER

## 2020-07-09 NOTE — PROGRESS NOTES
Subjective   Yuliana Kennedy is a 54 y.o. female     Patient requests and consents to telemedicine visit using A/V aid    Chief Complaint    Chronic left knee pain  Ear infection    History of Present Illness  Patient presents for recheck related to chronic left knee pain and renewal of opioid prescription.  She reports that her pain is stable and she is attempting to decrease use of pain medicine.  Medicine allows her to perform activities of daily living including working outside in her garden.  Complains of ear infection with bilateral ear pain after [place] her son.  She has a history of otitis media and otitis externa often quite refractory to treatments.  She had called earlier about antibiotic prescription and this was provided for her.  She feels that after 3 doses she has improved.    The following portions of the patient's history were reviewed and updated as appropriate: allergies, current medications, past social history and problem list    Review of Systems   Constitutional: Negative for chills and fever.   HENT: Positive for ear pain and postnasal drip. Negative for congestion, ear discharge, sinus pressure, sinus pain and sore throat.    Respiratory: Positive for cough. Negative for shortness of breath and wheezing.    Cardiovascular: Negative for chest pain and palpitations.   Musculoskeletal: Positive for arthralgias, gait problem and myalgias.   Neurological: Positive for weakness. Negative for dizziness and numbness.   Psychiatric/Behavioral: Negative for dysphoric mood. The patient is nervous/anxious.        Objective     There were no vitals filed for this visit.    Physical Exam   Constitutional: She is oriented to person, place, and time. She appears well-developed and well-nourished.   HENT:   Head: Normocephalic and atraumatic.   Neurological: She is alert and oriented to person, place, and time.   Psychiatric: Her behavior is normal. Her mood appears  anxious.       Assessment/Plan   Problem List Items Addressed This Visit     None      Visit Diagnoses     Chronic pain of left knee    -  Primary    Relevant Medications    oxyCODONE-acetaminophen (PERCOCET)  MG per tablet    Chronic low back pain without sciatica, unspecified back pain laterality        Relevant Medications    oxyCODONE-acetaminophen (PERCOCET)  MG per tablet    Recurrent otitis externa, unspecified laterality            Continue Augmentin for ear issues.    Telemedicine visit 15 minutes.

## 2020-08-21 ENCOUNTER — TELEPHONE (OUTPATIENT)
Dept: FAMILY MEDICINE CLINIC | Facility: CLINIC | Age: 55
End: 2020-08-21

## 2020-08-21 RX ORDER — CEFDINIR 300 MG/1
600 CAPSULE ORAL DAILY
Qty: 20 CAPSULE | Refills: 0 | Status: SHIPPED | OUTPATIENT
Start: 2020-08-21 | End: 2020-10-13

## 2020-08-21 NOTE — TELEPHONE ENCOUNTER
Patient requesting a call back from the provider/nurse. Patient stated she needs a virtual appt to be seen for med refills and for possible sinus infection. She is requesting an antibiotic to treat the symptoms: bad headache and pressure.     Please call patient and advise @[phone]    HUB NOT ABLE TO FIND VIRTUAL APPT WITH PCP.

## 2020-08-24 ENCOUNTER — TELEMEDICINE (OUTPATIENT)
Dept: FAMILY MEDICINE CLINIC | Facility: CLINIC | Age: 55
End: 2020-08-24

## 2020-08-24 DIAGNOSIS — M17.10 PRIMARY OSTEOARTHRITIS OF KNEE, UNSPECIFIED LATERALITY: ICD-10-CM

## 2020-08-24 DIAGNOSIS — G89.29 CHRONIC LOW BACK PAIN WITHOUT SCIATICA, UNSPECIFIED BACK PAIN LATERALITY: ICD-10-CM

## 2020-08-24 DIAGNOSIS — G89.29 CHRONIC PAIN OF LEFT KNEE: Primary | ICD-10-CM

## 2020-08-24 DIAGNOSIS — M25.562 CHRONIC PAIN OF LEFT KNEE: ICD-10-CM

## 2020-08-24 DIAGNOSIS — J01.91 ACUTE RECURRENT SINUSITIS, UNSPECIFIED LOCATION: ICD-10-CM

## 2020-08-24 DIAGNOSIS — M54.50 CHRONIC LOW BACK PAIN WITHOUT SCIATICA, UNSPECIFIED BACK PAIN LATERALITY: ICD-10-CM

## 2020-08-24 DIAGNOSIS — G89.29 CHRONIC PAIN OF LEFT KNEE: ICD-10-CM

## 2020-08-24 DIAGNOSIS — M25.562 CHRONIC PAIN OF LEFT KNEE: Primary | ICD-10-CM

## 2020-08-24 PROCEDURE — 99213 OFFICE O/P EST LOW 20 MIN: CPT | Performed by: PHYSICIAN ASSISTANT

## 2020-08-24 RX ORDER — OXYCODONE AND ACETAMINOPHEN 10; 325 MG/1; MG/1
1 TABLET ORAL 4 TIMES DAILY
Qty: 120 TABLET | Refills: 0 | Status: SHIPPED | OUTPATIENT
Start: 2020-08-24 | End: 2020-10-21 | Stop reason: SDUPTHER

## 2020-08-24 NOTE — PROGRESS NOTES
Subjective   Yuliana Kennedy is a 54 y.o. female  Med Refill      History of Present Illness  Patient is a pleasant 54-year-old white female who presents today via video visit after giving her consent for this to be her office visit.  She is following up on recent diagnosis of sinusitis, antibiotic was sent to her pharmacy last week.  Has history of recurrent sinusitis.  She states she seems to have symptoms worse in the fall.  She is doing well on antibiotic that was prescribed, feels that sinus pressure is improving, no fever, no sore throat no cough.  Second problem she is following up on today's chronic knee pain due to severe degenerative joint disease of her knee.  She states she has been told that she will eventually need to have knee replacement surgery but she is trying to postpone this as long as she can.  She states she does have problems where she is more prone to falls because she is unable to completely bend or continue straight her knee, she states she has make sure to lift her leg up more when she walks.  No recent falls.  She continues to have severe pain limiting her ability to carry out daily activities without the assistance of Percocet.  She states she is taking his medication 4 times daily and with his current dosage of Percocet she is able to control her pain to a tolerable level, she states that she still is unable to fully bend her for straighten her knee but that she is able to be functional and carry out daily activities with assistance of medication.  Denies adverse effects with medication.  Video visit today was 15 minutes in length  The following portions of the patient's history were reviewed and updated as appropriate: allergies, current medications, past social history and problem list    Review of Systems   Constitutional: Negative for activity change, chills, fatigue and fever.   HENT: Positive for congestion, ear pain, postnasal drip, rhinorrhea and sinus pressure. Negative for  sore throat.    Eyes: Negative for pain.   Respiratory: Negative for cough and shortness of breath.    Cardiovascular: Negative.    Musculoskeletal: Positive for arthralgias and myalgias. Negative for gait problem and joint swelling.   Skin: Negative.    Neurological: Positive for headaches. Negative for dizziness, weakness and numbness.   Hematological: Negative for adenopathy.       Objective     There were no vitals filed for this visit.    Physical Exam   Constitutional: She is oriented to person, place, and time. She appears well-developed and well-nourished. She does not have a sickly appearance. She does not appear ill. No distress.   HENT:   Head: Normocephalic and atraumatic.   Right Ear: Tympanic membrane and ear canal normal.   Left Ear: Tympanic membrane and ear canal normal.   Nose: Mucosal edema, rhinorrhea and sinus tenderness present. Right sinus exhibits maxillary sinus tenderness and frontal sinus tenderness. Left sinus exhibits maxillary sinus tenderness and frontal sinus tenderness.   Eyes: Conjunctivae are normal.   Pulmonary/Chest: Effort normal. No respiratory distress.   Musculoskeletal: She exhibits tenderness.   Neurological: She is alert and oriented to person, place, and time.   Skin: No rash noted. She is not diaphoretic. No erythema. No pallor.   Psychiatric: She has a normal mood and affect. Her behavior is normal. Judgment and thought content normal.       Assessment/Plan     Yuliana was seen today for med refill.    Diagnoses and all orders for this visit:    Chronic pain of left knee    Primary osteoarthritis of knee, unspecified laterality    Acute recurrent sinusitis, unspecified location    Patient will continue on currently prescribed Omnicef for sinus infection will follow-up for further evaluation if symptoms persist.  Prescription will be sent for current dosage of Percocet 10/325 mg tablets to take 1 tablet 4 times daily for chronic pain control of arthritis and knee #120 with  0 refills.  Discussed abuse potential and controlled substance status of this medication.  Discussed need to follow-up in 1 month for recheck.

## 2020-10-13 ENCOUNTER — TELEPHONE (OUTPATIENT)
Dept: FAMILY MEDICINE CLINIC | Facility: CLINIC | Age: 55
End: 2020-10-13

## 2020-10-13 RX ORDER — CEPHALEXIN 500 MG/1
500 CAPSULE ORAL 3 TIMES DAILY
Qty: 21 CAPSULE | Refills: 0 | Status: SHIPPED | OUTPATIENT
Start: 2020-10-13 | End: 2020-10-21

## 2020-10-13 NOTE — TELEPHONE ENCOUNTER
PT. SEE'S DR. CALDWELL  PT. WOULD LIKE SOMETHING CALLED IN FOR HER SINUS INFECTION?    RX=[facility].    PT. CAN BE REACHED @ [phone].

## 2020-10-21 ENCOUNTER — TELEPHONE (OUTPATIENT)
Dept: FAMILY MEDICINE CLINIC | Facility: CLINIC | Age: 55
End: 2020-10-21

## 2020-10-21 ENCOUNTER — TELEMEDICINE (OUTPATIENT)
Dept: FAMILY MEDICINE CLINIC | Facility: CLINIC | Age: 55
End: 2020-10-21

## 2020-10-21 DIAGNOSIS — G89.29 CHRONIC PAIN OF LEFT KNEE: ICD-10-CM

## 2020-10-21 DIAGNOSIS — M17.10 PRIMARY OSTEOARTHRITIS OF KNEE, UNSPECIFIED LATERALITY: ICD-10-CM

## 2020-10-21 DIAGNOSIS — J01.91 ACUTE RECURRENT SINUSITIS, UNSPECIFIED LOCATION: Primary | ICD-10-CM

## 2020-10-21 DIAGNOSIS — G89.29 CHRONIC LOW BACK PAIN WITHOUT SCIATICA, UNSPECIFIED BACK PAIN LATERALITY: ICD-10-CM

## 2020-10-21 DIAGNOSIS — M25.562 CHRONIC PAIN OF LEFT KNEE: ICD-10-CM

## 2020-10-21 DIAGNOSIS — M54.50 CHRONIC LOW BACK PAIN WITHOUT SCIATICA, UNSPECIFIED BACK PAIN LATERALITY: ICD-10-CM

## 2020-10-21 DIAGNOSIS — F41.1 GENERALIZED ANXIETY DISORDER: ICD-10-CM

## 2020-10-21 DIAGNOSIS — F41.9 ANXIETY: ICD-10-CM

## 2020-10-21 PROCEDURE — 99214 OFFICE O/P EST MOD 30 MIN: CPT | Performed by: PHYSICIAN ASSISTANT

## 2020-10-21 RX ORDER — OXYCODONE AND ACETAMINOPHEN 10; 325 MG/1; MG/1
1 TABLET ORAL 4 TIMES DAILY
Qty: 120 TABLET | Refills: 0 | Status: SHIPPED | OUTPATIENT
Start: 2020-10-21 | End: 2020-12-08 | Stop reason: SDUPTHER

## 2020-10-21 RX ORDER — METHYLPREDNISOLONE 4 MG/1
TABLET ORAL
Qty: 1 EACH | Refills: 0 | Status: SHIPPED | OUTPATIENT
Start: 2020-10-21 | End: 2020-12-08

## 2020-10-21 RX ORDER — ALPRAZOLAM 1 MG/1
1 TABLET ORAL 3 TIMES DAILY
Qty: 90 TABLET | Refills: 5 | Status: SHIPPED | OUTPATIENT
Start: 2020-10-21 | End: 2021-03-03 | Stop reason: SDUPTHER

## 2020-10-21 RX ORDER — AMOXICILLIN AND CLAVULANATE POTASSIUM 875; 125 MG/1; MG/1
1 TABLET, FILM COATED ORAL 2 TIMES DAILY
Qty: 20 TABLET | Refills: 0 | Status: SHIPPED | OUTPATIENT
Start: 2020-10-21 | End: 2020-12-08

## 2020-10-21 NOTE — PROGRESS NOTES
Subjective   Yuliana Kennedy is a 55 y.o. female  Med Refill, Osteoarthritis, and Sinusitis      History of Present Illness  Patient is a pleasant 55-year-old white female who presents today via video visit after giving her consent for this to be her office visit.  She is being seen today for 3 separate medical conditions 1 is chronic knee pain associate with severe degenerative joint disease and osteoarthritis of her knee as well as chronic back pain both of these are currently treated with chronic opioid therapy on a daily basis.  She states continues have pain in her knee and back on a daily basis that with his medication regimen she feels it is tolerable and pain is a less than 5 out of 10 and she is able to carry out ADLs without assistance.  Without the medication at this dose that she is not able to carry out ADLs without significant assistance needed.  She states he is also having ongoing recurrent problem with her sinuses.  She just completed a course of Keflex and took a course of Omnicef last month.  She states she is a lot of problems with her sinuses and she is been experiencing moderate nasal congestion with sinus blockage and pressure in her sinuses with yellow thick nasal congestion.  No fever no shortness of breath no cough no sore throat.  No dizziness.  Lastly she is following up on generalized anxiety disorder which is currently stable on medication and is due for refills of her Xanax.  She is taking this as prescribed 1 mg 3 times daily along with sertraline 100 g daily.  Video visit 20 minutes in length.  The following portions of the patient's history were reviewed and updated as appropriate: allergies, current medications, past social history and problem list    Review of Systems   Constitutional: Positive for activity change. Negative for chills, fatigue and fever.   HENT: Positive for congestion, ear pain, postnasal drip, rhinorrhea, sinus pressure and sinus pain. Negative for sore throat.     Eyes: Negative for pain.   Respiratory: Negative for cough and shortness of breath.    Musculoskeletal: Positive for arthralgias, gait problem and myalgias. Negative for joint swelling.   Skin: Negative.    Allergic/Immunologic: Positive for environmental allergies.   Neurological: Positive for headaches. Negative for dizziness, weakness and numbness.   Hematological: Negative for adenopathy.   Psychiatric/Behavioral: Negative for dysphoric mood. The patient is nervous/anxious.        Objective     There were no vitals filed for this visit.    Physical Exam  Constitutional:       General: She is not in acute distress.     Appearance: Normal appearance. She is well-developed. She is not ill-appearing, toxic-appearing or diaphoretic.   HENT:      Head: Normocephalic and atraumatic.   Pulmonary:      Effort: Pulmonary effort is normal. No respiratory distress.   Skin:     General: Skin is dry.      Coloration: Skin is not pale.      Findings: No erythema or rash.   Neurological:      Mental Status: She is alert and oriented to person, place, and time.      Coordination: Coordination normal.   Psychiatric:         Attention and Perception: She is attentive.         Mood and Affect: Mood normal.         Speech: Speech normal.         Behavior: Behavior normal.         Thought Content: Thought content normal.         Judgment: Judgment normal.         Assessment/Plan     Diagnoses and all orders for this visit:    1. Acute recurrent sinusitis, unspecified location (Primary)    2. Chronic low back pain without sciatica, unspecified back pain laterality    3. Primary osteoarthritis of knee, unspecified laterality    4. Generalized anxiety disorder    Other orders  -     amoxicillin-clavulanate (Augmentin) 875-125 MG per tablet; Take 1 tablet by mouth 2 (Two) Times a Day.  Dispense: 20 tablet; Refill: 0  -     methylPREDNISolone (MEDROL) 4 MG dose pack; Take as directed on package instructions.  Dispense: 1 each; Refill:  0    Prescription will be sent to patient's pharmacy for refill of Xanax at current dosage 1 mg 3 times daily #90 with 4 refills and Percocet 10/325 1 4 times daily for 120 with 0 refills.  Discussed abuse potential and controlled substance as these medications need follow-up in 1 month for recheck.  Regarding sinuses as this is recurrent symptoms persist would recommend CT scan of sinuses to be ordered and consider referral to ENT.

## 2020-10-21 NOTE — TELEPHONE ENCOUNTER
PT CALLED STATED THAT HER RX   oxyCODONE-acetaminophen (PERCOCET)  MG per tablet AND ANOTHER RX HAS NOT BEEN SENT TO THE PHARMACY, PT NOT SURE THE NAME OF THE OTHER RX.    PLEASE ADVISE.  CALL BACK:[phone]    [facility]  - [facility] [place] - [id number] [facility]

## 2020-12-08 ENCOUNTER — TELEMEDICINE (OUTPATIENT)
Dept: FAMILY MEDICINE CLINIC | Facility: CLINIC | Age: 55
End: 2020-12-08

## 2020-12-08 DIAGNOSIS — M54.50 CHRONIC LOW BACK PAIN WITHOUT SCIATICA, UNSPECIFIED BACK PAIN LATERALITY: ICD-10-CM

## 2020-12-08 DIAGNOSIS — M25.562 CHRONIC PAIN OF LEFT KNEE: Primary | ICD-10-CM

## 2020-12-08 DIAGNOSIS — G89.29 CHRONIC PAIN OF LEFT KNEE: Primary | ICD-10-CM

## 2020-12-08 DIAGNOSIS — J01.91 ACUTE RECURRENT SINUSITIS, UNSPECIFIED LOCATION: ICD-10-CM

## 2020-12-08 DIAGNOSIS — G89.29 CHRONIC LOW BACK PAIN WITHOUT SCIATICA, UNSPECIFIED BACK PAIN LATERALITY: ICD-10-CM

## 2020-12-08 PROCEDURE — 99213 OFFICE O/P EST LOW 20 MIN: CPT | Performed by: FAMILY MEDICINE

## 2020-12-08 RX ORDER — LEVOFLOXACIN 500 MG/1
500 TABLET, FILM COATED ORAL DAILY
Qty: 10 TABLET | Refills: 0 | Status: SHIPPED | OUTPATIENT
Start: 2020-12-08 | End: 2021-03-03

## 2020-12-08 RX ORDER — OXYCODONE AND ACETAMINOPHEN 10; 325 MG/1; MG/1
1 TABLET ORAL 4 TIMES DAILY
Qty: 120 TABLET | Refills: 0 | Status: SHIPPED | OUTPATIENT
Start: 2020-12-08 | End: 2021-01-19 | Stop reason: SDUPTHER

## 2020-12-08 NOTE — PROGRESS NOTES
Subjective   Yuliana Kennedy is a 55 y.o. female   Patient requests and consents to telemedicine visit using audiovisual aid.    Chief Complaint    Chronic pain left knee  Chronic back pain  Persisting sinus infection    History of Present Illness  Patient presents today using telemedicine visit for recheck and renewal of her opioid analgesics which she uses for chronic knee and back pain.  She is habituated to the medication.  Attempts to wean her off the medication have resulted in increased pain and decreased daily function as far as her ADLs are concerned.  Her Claudio has been reviewed today and is appropriate.    Patient has been on 2 antibiotics, cefdinir and Augmentin, with persisting sinus pain and pressure as well as drainage.  She has a history of recurring upper respiratory infections including sinusitis and otitis.  Patient is a chronic smoker which likely strongly contributes to her recurring infections.  She is advised of this again.    The following portions of the patient's history were reviewed and updated as appropriate: allergies, current medications, past social history and problem list    Review of Systems   Constitutional: Negative for chills and fever.   HENT: Positive for congestion, sinus pressure and sinus pain. Negative for ear discharge, ear pain, postnasal drip, sore throat, trouble swallowing and voice change.    Respiratory: Positive for cough. Negative for shortness of breath and wheezing.    Cardiovascular: Negative for chest pain and palpitations.   Musculoskeletal: Positive for arthralgias, back pain, gait problem and myalgias.   Skin: Negative.    Allergic/Immunologic: Negative for immunocompromised state.   Neurological: Positive for weakness. Negative for dizziness and numbness.   Hematological: Negative for adenopathy. Does not bruise/bleed easily.   Psychiatric/Behavioral: Negative for dysphoric mood. The patient is nervous/anxious.        Objective     There were no vitals  filed for this visit.    Physical Exam  Constitutional:       Appearance: Normal appearance.   HENT:      Head: Normocephalic and atraumatic.   Neurological:      General: No focal deficit present.      Mental Status: She is alert and oriented to person, place, and time.   Psychiatric:         Mood and Affect: Mood normal.         Behavior: Behavior normal.         Assessment/Plan   Problems Addressed this Visit     None      Visit Diagnoses     Chronic pain of left knee    -  Primary    Relevant Medications    oxyCODONE-acetaminophen (PERCOCET)  MG per tablet    Chronic low back pain without sciatica, unspecified back pain laterality        Relevant Medications    oxyCODONE-acetaminophen (PERCOCET)  MG per tablet    Acute recurrent sinusitis, unspecified location        Relevant Medications    levoFLOXacin (LEVAQUIN) 500 MG tablet      Diagnoses       Codes Comments    Chronic pain of left knee    -  Primary ICD-10-CM: M25.562, G89.29  ICD-9-CM: 719.46, 338.29     Chronic low back pain without sciatica, unspecified back pain laterality     ICD-10-CM: M54.5, G89.29  ICD-9-CM: 724.2, 338.29     Acute recurrent sinusitis, unspecified location     ICD-10-CM: J01.91  ICD-9-CM: 461.9         Telemedicine visit 15 minutes

## 2021-01-19 ENCOUNTER — TELEMEDICINE (OUTPATIENT)
Dept: FAMILY MEDICINE CLINIC | Facility: CLINIC | Age: 56
End: 2021-01-19

## 2021-01-19 DIAGNOSIS — G89.29 CHRONIC LOW BACK PAIN WITHOUT SCIATICA, UNSPECIFIED BACK PAIN LATERALITY: ICD-10-CM

## 2021-01-19 DIAGNOSIS — M25.562 CHRONIC PAIN OF LEFT KNEE: ICD-10-CM

## 2021-01-19 DIAGNOSIS — G89.29 CHRONIC PAIN OF LEFT KNEE: Primary | ICD-10-CM

## 2021-01-19 DIAGNOSIS — M54.50 CHRONIC LOW BACK PAIN WITHOUT SCIATICA, UNSPECIFIED BACK PAIN LATERALITY: ICD-10-CM

## 2021-01-19 DIAGNOSIS — G89.29 CHRONIC PAIN OF LEFT KNEE: ICD-10-CM

## 2021-01-19 DIAGNOSIS — M25.562 CHRONIC PAIN OF LEFT KNEE: Primary | ICD-10-CM

## 2021-01-19 PROCEDURE — 99213 OFFICE O/P EST LOW 20 MIN: CPT | Performed by: PHYSICIAN ASSISTANT

## 2021-01-19 RX ORDER — OXYCODONE AND ACETAMINOPHEN 10; 325 MG/1; MG/1
1 TABLET ORAL 4 TIMES DAILY
Qty: 120 TABLET | Refills: 0 | Status: SHIPPED | OUTPATIENT
Start: 2021-01-19 | End: 2021-03-03 | Stop reason: SDUPTHER

## 2021-01-19 NOTE — PROGRESS NOTES
Subjective   Yuliana Kennedy is a 55 y.o. female  Med Refill      History of Present Illness  Pt is a 54 yo WF presents today via video visit after giving her consent for this to be her office visit. She is being seen for fu on chronic pain left knee, chronic back pain with DDD managed with chronic opiod therapy. She is due for refills on percocet which she requires qid to manage pain so that she is able to complete ADLS and keep pain level managed at level below 5/10. Pt feels her pain level is currently stable and she is functional at this time with all ADLs. Denies adverse effects from medication. Video visit 15 min in length.   The following portions of the patient's history were reviewed and updated as appropriate: allergies, current medications, past social history and problem list    Review of Systems   Constitutional: Negative.    Respiratory: Negative.    Gastrointestinal: Negative.    Genitourinary: Negative.    Musculoskeletal: Positive for arthralgias, back pain, gait problem and myalgias. Negative for joint swelling.   Skin: Negative.    Neurological: Negative for dizziness, tremors, weakness and numbness.   Psychiatric/Behavioral: Negative.        Objective     There were no vitals filed for this visit.    Physical Exam  Constitutional:       General: She is not in acute distress.     Appearance: Normal appearance. She is well-developed. She is not ill-appearing, toxic-appearing or diaphoretic.   HENT:      Head: Normocephalic and atraumatic.   Musculoskeletal:         General: Tenderness present. No deformity.      Comments: Co pain left knee and back, chronically stable pain level.   Skin:     General: Skin is dry.      Coloration: Skin is not pale.      Findings: No bruising, erythema or rash.   Neurological:      Mental Status: She is alert and oriented to person, place, and time.      Motor: No abnormal muscle tone.      Coordination: Coordination normal.   Psychiatric:         Mood and Affect:  Mood normal.         Behavior: Behavior normal.         Thought Content: Thought content normal.         Judgment: Judgment normal.         Assessment/Plan     Diagnoses and all orders for this visit:    1. Chronic pain of left knee (Primary)    2. Chronic low back pain without sciatica, unspecified back pain laterality     rx will be sent for refill of current dose of percocet 10/325 one qid for chronic pain management #120. Discussed abuse potential and controlled substance status of this medication and need to fu for appt in one month for recheck to receive refills.

## 2021-03-03 ENCOUNTER — TELEMEDICINE (OUTPATIENT)
Dept: FAMILY MEDICINE CLINIC | Facility: CLINIC | Age: 56
End: 2021-03-03

## 2021-03-03 DIAGNOSIS — M25.562 CHRONIC PAIN OF LEFT KNEE: Primary | ICD-10-CM

## 2021-03-03 DIAGNOSIS — G89.29 CHRONIC LOW BACK PAIN WITHOUT SCIATICA, UNSPECIFIED BACK PAIN LATERALITY: ICD-10-CM

## 2021-03-03 DIAGNOSIS — G89.29 CHRONIC PAIN OF LEFT KNEE: Primary | ICD-10-CM

## 2021-03-03 DIAGNOSIS — M54.50 CHRONIC LOW BACK PAIN WITHOUT SCIATICA, UNSPECIFIED BACK PAIN LATERALITY: ICD-10-CM

## 2021-03-03 DIAGNOSIS — F41.9 ANXIETY: ICD-10-CM

## 2021-03-03 DIAGNOSIS — F41.1 GENERALIZED ANXIETY DISORDER: ICD-10-CM

## 2021-03-03 DIAGNOSIS — G89.29 CHRONIC PAIN OF LEFT KNEE: ICD-10-CM

## 2021-03-03 DIAGNOSIS — M25.562 CHRONIC PAIN OF LEFT KNEE: ICD-10-CM

## 2021-03-03 PROCEDURE — 99213 OFFICE O/P EST LOW 20 MIN: CPT | Performed by: PHYSICIAN ASSISTANT

## 2021-03-03 RX ORDER — OXYCODONE AND ACETAMINOPHEN 10; 325 MG/1; MG/1
1 TABLET ORAL 4 TIMES DAILY
Qty: 120 TABLET | Refills: 0 | Status: SHIPPED | OUTPATIENT
Start: 2021-03-03 | End: 2021-04-28 | Stop reason: SDUPTHER

## 2021-03-03 RX ORDER — ALPRAZOLAM 1 MG/1
1 TABLET ORAL 3 TIMES DAILY
Qty: 90 TABLET | Refills: 5 | Status: SHIPPED | OUTPATIENT
Start: 2021-03-03 | End: 2021-08-03 | Stop reason: SDUPTHER

## 2021-03-03 NOTE — TELEPHONE ENCOUNTER
Completed video visit for anxiety and chronic knee pain with Francheska and needs meds sent to pharm - [facility]

## 2021-03-03 NOTE — PROGRESS NOTES
Subjective   Yuliana Kennedy is a 55 y.o. female  Med Refill      History of Present Illness  Patient is a pleasant 55-year-old white female who presents today via video visit after given her consent for severe office visit.  She is following up on chronic pain management with opioid therapy for chronic knee pain and chronic back pain due to degenerative disc degenerative joint disease.  Without current pain medication regimen patient is unable to carry out ADLs, on medication she is able to care well for ADLs and denies any adverse effects of this medication.  She states the pain is controlled at less than 5 out of 10 on current regimen, without this medication pain rises to 8-9 out of 10.  She is also following up generalized anxiety disorder which is currently managed with Xanax and Zoloft daily, she states she has had a bit more anxiety this week due to a close friend passing away but feels that her symptoms are appropriate grief and that on a daily basis her anxiety is well controlled, she denies any adverse effects from either of these medications.  She is due for refills on Xanax as well.  Video visit 15 minutes in length  The following portions of the patient's history were reviewed and updated as appropriate: allergies, current medications, past social history and problem list    Review of Systems   Constitutional: Negative for activity change, appetite change and fatigue.   Respiratory: Negative.  Negative for chest tightness and shortness of breath.    Cardiovascular: Negative.    Gastrointestinal: Negative.  Negative for abdominal pain, diarrhea and nausea.   Musculoskeletal: Positive for arthralgias, back pain and myalgias. Negative for gait problem and joint swelling.   Skin: Negative.    Neurological: Negative.  Negative for dizziness, tremors, weakness, light-headedness, numbness and headaches.   Psychiatric/Behavioral: Negative for agitation, behavioral problems, confusion, decreased concentration,  dysphoric mood, hallucinations, self-injury, sleep disturbance and suicidal ideas. The patient is nervous/anxious. The patient is not hyperactive.        Objective     There were no vitals filed for this visit.    Physical Exam  Constitutional:       General: She is not in acute distress.     Appearance: Normal appearance. She is well-developed. She is not ill-appearing, toxic-appearing or diaphoretic.   HENT:      Head: Normocephalic and atraumatic.   Eyes:      Conjunctiva/sclera: Conjunctivae normal.   Neck:      Thyroid: No thyroid mass or thyromegaly.   Pulmonary:      Effort: Pulmonary effort is normal. No respiratory distress.   Skin:     General: Skin is dry.      Coloration: Skin is not jaundiced or pale.      Findings: No erythema or rash.   Neurological:      Mental Status: She is alert and oriented to person, place, and time.   Psychiatric:         Attention and Perception: Attention normal. She is attentive.         Mood and Affect: Mood and affect normal. Mood is not anxious or depressed. Affect is not angry or inappropriate.         Speech: Speech normal.         Behavior: Behavior normal. Behavior is cooperative.         Thought Content: Thought content normal.         Cognition and Memory: Cognition and memory normal.         Judgment: Judgment normal.         Assessment/Plan     Diagnoses and all orders for this visit:    1. Chronic pain of left knee (Primary)    2. Chronic low back pain without sciatica, unspecified back pain laterality    3. Generalized anxiety disorder    Refills will be sent to patient's pharmacy for current dosage of Percocet  mg 1 tablet 4 times daily for chronic pain #120 with 0 refills and Xanax at current dosage 1 mg 3 times daily for anxiety #90 with 5 refills, discussed abuse potential controlled substance as above these medications need to follow-up in 1 month for reevaluation to receive refills of Percocet.  Patient has adequate refills for Zoloft at this time.

## 2021-04-28 ENCOUNTER — OFFICE VISIT (OUTPATIENT)
Dept: FAMILY MEDICINE CLINIC | Facility: CLINIC | Age: 56
End: 2021-04-28

## 2021-04-28 VITALS
HEIGHT: 66 IN | HEART RATE: 83 BPM | WEIGHT: 156 LBS | SYSTOLIC BLOOD PRESSURE: 132 MMHG | TEMPERATURE: 98.2 F | RESPIRATION RATE: 15 BRPM | DIASTOLIC BLOOD PRESSURE: 72 MMHG | OXYGEN SATURATION: 97 % | BODY MASS INDEX: 25.07 KG/M2

## 2021-04-28 DIAGNOSIS — M54.50 CHRONIC LOW BACK PAIN WITHOUT SCIATICA, UNSPECIFIED BACK PAIN LATERALITY: ICD-10-CM

## 2021-04-28 DIAGNOSIS — F41.9 ANXIETY: ICD-10-CM

## 2021-04-28 DIAGNOSIS — G89.29 CHRONIC LOW BACK PAIN WITHOUT SCIATICA, UNSPECIFIED BACK PAIN LATERALITY: ICD-10-CM

## 2021-04-28 DIAGNOSIS — G89.29 CHRONIC PAIN OF LEFT KNEE: Primary | ICD-10-CM

## 2021-04-28 DIAGNOSIS — S30.860A TICK BITE OF BACK, INITIAL ENCOUNTER: ICD-10-CM
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
## 2021-04-28 DIAGNOSIS — M25.562 CHRONIC PAIN OF LEFT KNEE: Primary | ICD-10-CM

## 2021-04-28 PROCEDURE — 99214 OFFICE O/P EST MOD 30 MIN: CPT | Performed by: FAMILY MEDICINE

## 2021-04-28 RX ORDER — OXYCODONE AND ACETAMINOPHEN 10; 325 MG/1; MG/1
1 TABLET ORAL 4 TIMES DAILY
Qty: 120 TABLET | Refills: 0 | Status: SHIPPED | OUTPATIENT
Start: 2021-04-28 | End: 2021-06-14 | Stop reason: SDUPTHER

## 2021-04-28 RX ORDER — DOXYCYCLINE 100 MG/1
100 CAPSULE ORAL 2 TIMES DAILY
Qty: 20 CAPSULE | Refills: 0 | Status: SHIPPED | OUTPATIENT
Start: 2021-04-28 | End: 2021-05-08

## 2021-04-28 NOTE — PROGRESS NOTES
Subjective   Yuliana Kennedy is a 55 y.o. female    Chief Complaint    Chronic knee pain  Anxiety disorder  GERD    History of Present Illness  The patient said she had a tick bite on her back 4 to 5 days ago and it is itching really bad.     She also notes her sinuses are bad. The patient notes spending 2 days in bed with a headache and her sinuses.    The patient has received her COVID vaccine.     The following portions of the patient's history were reviewed and updated as appropriate: allergies, current medications, past social history and problem list    Review of Systems   Constitutional: Negative for appetite change and fatigue.   Respiratory: Negative for chest tightness and shortness of breath.    Gastrointestinal: Negative for abdominal pain, diarrhea and nausea.   Musculoskeletal: Positive for arthralgias, gait problem and myalgias.        Chronic knee pain   Skin: Positive for wound.   Neurological: Negative for dizziness, tremors, weakness, light-headedness and headaches.   Psychiatric/Behavioral: Positive for dysphoric mood and sleep disturbance. Negative for agitation, behavioral problems, confusion, decreased concentration and suicidal ideas. The patient is nervous/anxious.        Objective     Vitals:    04/28/21 0955   BP: 132/72   Pulse: 83   Resp: 15   Temp: 98.2 °F (36.8 °C)   SpO2: 97%       Physical Exam  Constitutional:       Appearance: Normal appearance.   HENT:      Head: Normocephalic and atraumatic.   Eyes:      Conjunctiva/sclera: Conjunctivae normal.      Pupils: Pupils are equal, round, and reactive to light.   Cardiovascular:      Rate and Rhythm: Normal rate and regular rhythm.      Heart sounds: Normal heart sounds.   Pulmonary:      Effort: Pulmonary effort is normal.      Breath sounds: Decreased breath sounds present.   Musculoskeletal:      Cervical back: Neck supple.   Lymphadenopathy:      Cervical: No cervical adenopathy.   Skin:     Comments: Tick bite site right upper back  at inferior border of the right scapula.  2 to 3 mm of abrasion with minimal erythema, no purulent drainage and no visible insect parts when viewed with magnification.   Neurological:      General: No focal deficit present.      Mental Status: She is alert and oriented to person, place, and time.   Psychiatric:         Mood and Affect: Mood normal.         Behavior: Behavior normal.         Assessment/Plan   Problems Addressed this Visit     None      Visit Diagnoses     Chronic pain of left knee    -  Primary    Relevant Medications    oxyCODONE-acetaminophen (PERCOCET)  MG per tablet    Chronic low back pain without sciatica, unspecified back pain laterality        Relevant Medications    oxyCODONE-acetaminophen (PERCOCET)  MG per tablet    Tick bite of back, initial encounter        Relevant Medications    doxycycline (MONODOX) 100 MG capsule    Anxiety          Diagnoses       Codes Comments    Chronic pain of left knee    -  Primary ICD-10-CM: M25.562, G89.29  ICD-9-CM: 719.46, 338.29     Chronic low back pain without sciatica, unspecified back pain laterality     ICD-10-CM: M54.5, G89.29  ICD-9-CM: 724.2, 338.29     Tick bite of back, initial encounter     ICD-10-CM: S30.860A, W57.XXXA  ICD-9-CM: 911.4, E906.4     Anxiety     ICD-10-CM: F41.9  ICD-9-CM: 300.00         Continue antianxiety medications.  No refills needed today.     Scribed for KHRIS Crawford MD by Jessica Nichole.  04/28/21   10:56 EDT    I have personally performed the services described in this document as scribed by the above individual, and it is both accurate and complete.  KHRIS Crawford MD  4/28/2021  13:08 EDT

## 2021-06-14 ENCOUNTER — TELEMEDICINE (OUTPATIENT)
Dept: FAMILY MEDICINE CLINIC | Facility: CLINIC | Age: 56
End: 2021-06-14

## 2021-06-14 DIAGNOSIS — G89.29 CHRONIC PAIN OF LEFT KNEE: ICD-10-CM

## 2021-06-14 DIAGNOSIS — J30.9 ALLERGIC RHINITIS, UNSPECIFIED SEASONALITY, UNSPECIFIED TRIGGER: ICD-10-CM

## 2021-06-14 DIAGNOSIS — K21.00 GASTROESOPHAGEAL REFLUX DISEASE WITH ESOPHAGITIS: ICD-10-CM

## 2021-06-14 DIAGNOSIS — M25.562 CHRONIC PAIN OF LEFT KNEE: ICD-10-CM

## 2021-06-14 DIAGNOSIS — F41.9 ANXIETY: ICD-10-CM

## 2021-06-14 DIAGNOSIS — F32.A DEPRESSION, UNSPECIFIED DEPRESSION TYPE: Primary | ICD-10-CM

## 2021-06-14 DIAGNOSIS — M54.50 CHRONIC LOW BACK PAIN WITHOUT SCIATICA, UNSPECIFIED BACK PAIN LATERALITY: ICD-10-CM

## 2021-06-14 DIAGNOSIS — J01.90 ACUTE NON-RECURRENT SINUSITIS, UNSPECIFIED LOCATION: ICD-10-CM

## 2021-06-14 DIAGNOSIS — G89.29 CHRONIC LOW BACK PAIN WITHOUT SCIATICA, UNSPECIFIED BACK PAIN LATERALITY: ICD-10-CM

## 2021-06-14 PROCEDURE — 99214 OFFICE O/P EST MOD 30 MIN: CPT | Performed by: PHYSICIAN ASSISTANT

## 2021-06-14 RX ORDER — OMEPRAZOLE 40 MG/1
40 CAPSULE, DELAYED RELEASE ORAL
Qty: 30 CAPSULE | Refills: 11 | Status: SHIPPED | OUTPATIENT
Start: 2021-06-14 | End: 2022-05-24

## 2021-06-14 RX ORDER — OXYCODONE AND ACETAMINOPHEN 10; 325 MG/1; MG/1
1 TABLET ORAL 4 TIMES DAILY
Qty: 120 TABLET | Refills: 0 | Status: SHIPPED | OUTPATIENT
Start: 2021-06-14 | End: 2021-08-03 | Stop reason: SDUPTHER

## 2021-06-14 RX ORDER — FEXOFENADINE HCL 180 MG/1
180 TABLET ORAL DAILY
Qty: 30 TABLET | Refills: 11 | Status: SHIPPED | OUTPATIENT
Start: 2021-06-14 | End: 2022-12-06

## 2021-06-14 RX ORDER — SERTRALINE HYDROCHLORIDE 100 MG/1
100 TABLET, FILM COATED ORAL DAILY
Qty: 30 TABLET | Refills: 11 | Status: SHIPPED | OUTPATIENT
Start: 2021-06-14 | End: 2022-06-16 | Stop reason: SDUPTHER

## 2021-06-14 RX ORDER — AMOXICILLIN 500 MG/1
500 CAPSULE ORAL 3 TIMES DAILY
Qty: 21 CAPSULE | Refills: 0 | Status: SHIPPED | OUTPATIENT
Start: 2021-06-14 | End: 2021-08-03

## 2021-06-14 NOTE — PROGRESS NOTES
Subjective    Yuliana Kennedy is a 55 y.o. female  Med Refill      History of Present Illness  Patient is a pleasant 55-year-old white female who presents today via video visit after giving her consent for this to be her office visit.  She is following up today on chronic depression, anxiety, allergic rhinitis, GERD and chronic knee pain managed with daily opioid therapy due for refills.  She is also having a new problem of worsening discolored sinus congestion and sinus pressure for the past week.  Her depression and anxiety are currently stable on Zoloft and Xanax, due for refills of Zoloft.  GERD stable on Prilosec due for refills, allergies stable on Allegra due for refills, she states with the wet weather she has had more problems with some sinus congestion and pressure with discoloration and worsening symptoms of infection developing this week.  No fever.  Patient also states she had some increased knee pain with wet weather, continues to require Percocet 4 times daily to be able to manage ADLs and controlled pain at 5 out of 10 level.  Denies any adverse effects on current medications.  Video visit 15 minutes in length.  The following portions of the patient's history were reviewed and updated as appropriate: allergies, current medications, past social history and problem list    Review of Systems   Constitutional: Negative for activity change, chills, fatigue and fever.   HENT: Positive for congestion, postnasal drip, rhinorrhea and sinus pressure. Negative for ear pain and sore throat.    Eyes: Negative for pain.   Respiratory: Negative for cough and shortness of breath.    Cardiovascular: Negative.    Gastrointestinal: Negative.    Musculoskeletal: Positive for arthralgias and myalgias. Negative for gait problem and joint swelling.   Skin: Negative.    Allergic/Immunologic: Positive for environmental allergies.   Neurological: Positive for headaches. Negative for dizziness, weakness and numbness.    Hematological: Negative for adenopathy.   Psychiatric/Behavioral: Negative.         Anxiety and depression stable on medication       Objective     There were no vitals filed for this visit.    Physical Exam  Constitutional:       General: She is not in acute distress.     Appearance: Normal appearance. She is well-developed. She is not ill-appearing, toxic-appearing or diaphoretic.   HENT:      Head: Normocephalic and atraumatic.   Eyes:      Conjunctiva/sclera: Conjunctivae normal.   Pulmonary:      Effort: Pulmonary effort is normal. No respiratory distress.   Musculoskeletal:         General: No swelling.   Skin:     General: Skin is dry.      Coloration: Skin is not pale.      Findings: No erythema or rash.   Neurological:      Mental Status: She is alert and oriented to person, place, and time.      Coordination: Coordination normal.   Psychiatric:         Attention and Perception: She is attentive.         Mood and Affect: Mood normal.         Speech: Speech normal.         Behavior: Behavior normal.         Thought Content: Thought content normal.         Judgment: Judgment normal.         Assessment/Plan     Diagnoses and all orders for this visit:    1. Depression, unspecified depression type (Primary)  -     sertraline (ZOLOFT) 100 MG tablet; Take 1 tablet by mouth Daily.  Dispense: 30 tablet; Refill: 11    2. Anxiety  -     sertraline (ZOLOFT) 100 MG tablet; Take 1 tablet by mouth Daily.  Dispense: 30 tablet; Refill: 11    3. Allergic rhinitis, unspecified seasonality, unspecified trigger  -     fexofenadine (ALLEGRA) 180 MG tablet; Take 1 tablet by mouth Daily. Take 180 mg by mouth Daily.  Dispense: 30 tablet; Refill: 11    4. Gastroesophageal reflux disease with esophagitis  -     omeprazole (priLOSEC) 40 MG capsule; Take 1 capsule by mouth Every Morning Before Breakfast.  Dispense: 30 capsule; Refill: 11    5. Chronic pain of left knee    6. Acute non-recurrent sinusitis, unspecified  location    Other orders  -     amoxicillin (AMOXIL) 500 MG capsule; Take 1 capsule by mouth 3 (Three) Times a Day. For sinus infection  Dispense: 21 capsule; Refill: 0    Prescription refill will be sent to her pharmacy for current dosage of Percocet 10/325 mg 1 tablet 4 times daily for chronic knee pain #120 with 0 refills.  Discussed abuse potential and controlled substance as of this medication need follow-up in 1 month for recheck.

## 2021-08-03 ENCOUNTER — TELEMEDICINE (OUTPATIENT)
Dept: FAMILY MEDICINE CLINIC | Facility: CLINIC | Age: 56
End: 2021-08-03

## 2021-08-03 ENCOUNTER — TELEPHONE (OUTPATIENT)
Dept: FAMILY MEDICINE CLINIC | Facility: CLINIC | Age: 56
End: 2021-08-03

## 2021-08-03 DIAGNOSIS — G89.29 CHRONIC PAIN OF LEFT KNEE: ICD-10-CM

## 2021-08-03 DIAGNOSIS — M54.50 CHRONIC LOW BACK PAIN WITHOUT SCIATICA, UNSPECIFIED BACK PAIN LATERALITY: Primary | ICD-10-CM

## 2021-08-03 DIAGNOSIS — M54.50 CHRONIC LOW BACK PAIN WITHOUT SCIATICA, UNSPECIFIED BACK PAIN LATERALITY: ICD-10-CM

## 2021-08-03 DIAGNOSIS — M25.562 CHRONIC PAIN OF LEFT KNEE: ICD-10-CM

## 2021-08-03 DIAGNOSIS — G89.29 CHRONIC LOW BACK PAIN WITHOUT SCIATICA, UNSPECIFIED BACK PAIN LATERALITY: ICD-10-CM

## 2021-08-03 DIAGNOSIS — F41.9 ANXIETY: ICD-10-CM

## 2021-08-03 DIAGNOSIS — F41.1 GENERALIZED ANXIETY DISORDER: ICD-10-CM

## 2021-08-03 DIAGNOSIS — G89.29 CHRONIC LOW BACK PAIN WITHOUT SCIATICA, UNSPECIFIED BACK PAIN LATERALITY: Primary | ICD-10-CM

## 2021-08-03 PROCEDURE — 99213 OFFICE O/P EST LOW 20 MIN: CPT | Performed by: PHYSICIAN ASSISTANT

## 2021-08-03 RX ORDER — OXYCODONE AND ACETAMINOPHEN 10; 325 MG/1; MG/1
1 TABLET ORAL 4 TIMES DAILY
Qty: 120 TABLET | Refills: 0 | Status: SHIPPED | OUTPATIENT
Start: 2021-08-03 | End: 2021-09-16 | Stop reason: SDUPTHER

## 2021-08-03 RX ORDER — ALPRAZOLAM 1 MG/1
1 TABLET ORAL 3 TIMES DAILY
Qty: 90 TABLET | Refills: 5 | Status: SHIPPED | OUTPATIENT
Start: 2021-08-03 | End: 2022-01-10 | Stop reason: SDUPTHER

## 2021-08-03 NOTE — TELEPHONE ENCOUNTER
Caller: Brent Yuliana W    Relationship: Self    Best call back number: [phone]    What is the best time to reach you: ANYTIME    Who are you requesting to speak with (clinical staff, provider,  specific staff member): DOCTOR , OR NURSE    Do you know the name of the person who called:     What was the call regarding: ALPRAZolam (XANAX) 1 MG tablet   oxyCODONE-acetaminophen (PERCOCET)  MG per tablet [[id number]]        Do you require a callback: YES.    PATIENT STATED PHARMACY IS STILL REQUIRING A PRIOR AUTHORIZATION FOR HER PRESCRIPTIONS LISTED ABOVE.  PATIENT WAS ABLE TO  HER PERCOCETS BUT PHARMACY STATED THAT SHE WOULD A PA NEXT TIME HER XANAX DUE.      PATIENT STATED SHE WILL BE CHANGING PHARMACIES AND WILL UPDATE AT A LATER TIME.    PATIENT REQUESTING CALL TO PHARMACY AND TO FOLLOW UP WITH HER.      PHARMACY:    [facility] [place], [place] - [address] [facility] [address] - [phone] [facility] [phone]   [phone]

## 2021-08-03 NOTE — TELEPHONE ENCOUNTER
Call to patient to update, pt understanding. Patient states she still has a little pain in the butt, it is a little better.    Completed video visit for anxiety and chronic pain, needs meds sent to [facility]

## 2021-08-03 NOTE — TELEPHONE ENCOUNTER
Pharmacy Name: [facility] - [place] - [facility] [address] - [phone]  - [phone] FX     Pharmacy representative name: MANISH    Pharmacy representative phone number: [phone]    What medication are you calling in regards to: XANAX    What question does the pharmacy have: HAVE YOU REC'D PRIOR AUTHORIZATION FOR THIS MEDICATION. PHARMACY WOULD APPRECIATE A CALL WHEN REC'D.    Who is the provider that prescribed the medication: DR. MORALES    Additional notes:

## 2021-08-03 NOTE — PROGRESS NOTES
Subjective   Yuliana Kennedy is a 55 y.o. female  Med Refill      History of Present Illness  Patient is a pleasant 55-year-old female.Patient presents and consents for telehealth/video visit examination.  Patient presents for follow-up on generalized anxiety disorder and chronic back pain managed with chronic opioid therapy she is due for refills of Percocet and Xanax.  She is on Zoloft as prescribed.  She denies any adverse effect from current medications.  Patient struggles to complete ADLs off of these medications but is able to carry out ADLs with pain being reduced to a tolerable level on Percocet and anxiety is well controlled on combination of Xanax and Zoloft.  Patient denies any adverse effects of either of these medication specifically denies any dizziness or drowsiness.  Video visit 15 minutes in length.  The following portions of the patient's history were reviewed and updated as appropriate: allergies, current medications, past social history and problem list    Review of Systems   Constitutional: Negative for appetite change and fatigue.   Respiratory: Negative for chest tightness and shortness of breath.    Gastrointestinal: Negative for abdominal pain, diarrhea and nausea.   Musculoskeletal: Positive for arthralgias, back pain and myalgias.   Neurological: Negative for dizziness, tremors, weakness, light-headedness and headaches.   Psychiatric/Behavioral: Negative for agitation, behavioral problems, confusion, decreased concentration, dysphoric mood, self-injury, sleep disturbance and suicidal ideas. The patient is nervous/anxious ( Stable on medication).        Objective     There were no vitals filed for this visit.    Physical Exam  Constitutional:       General: She is not in acute distress.     Appearance: Normal appearance. She is well-developed. She is not ill-appearing, toxic-appearing or diaphoretic.   HENT:      Head: Normocephalic and atraumatic.   Eyes:      Conjunctiva/sclera:  Conjunctivae normal.   Neck:      Thyroid: No thyroid mass or thyromegaly.   Pulmonary:      Effort: Pulmonary effort is normal. No respiratory distress.   Neurological:      Mental Status: She is alert and oriented to person, place, and time.      Coordination: Coordination normal.   Psychiatric:         Attention and Perception: Attention and perception normal. She is attentive.         Mood and Affect: Mood and affect normal. Mood is not anxious or depressed. Affect is not angry or inappropriate.         Speech: Speech normal.         Behavior: Behavior normal.         Thought Content: Thought content normal.         Judgment: Judgment normal.         Assessment/Plan     Diagnoses and all orders for this visit:    1. Chronic low back pain without sciatica, unspecified back pain laterality (Primary)    2. Generalized anxiety disorder    Refill be sent for 1 month supply of Percocet at current dosage and 6-month supply of Xanax at current dosage, continue on Zoloft as prescribed.    As part of this patient's treatment plan, patient will be prescribed controlled substances. The patient has been made aware of appropriate use of such medications, including potential risk of somnolence, limited ability to drive and /or work safely, and potential for dependence or overdose. It has also been made clear that these medications are for use by this patient only, without concomitant use of alcohol or other substances unless prescribed.Controlled substance status of medication discussed with patient, discussed risks of medication including abuse potential and diversion potential and need to follow up for reevaluation appointment in order to receive further refills.    Please note that portions of this document were completed with a voice recognition program. Efforts were made to edit the dictations, but occasionally words are mis-transcribed

## 2021-08-09 NOTE — TELEPHONE ENCOUNTER
Last Office Visit: 04/28/2021  Next Office Visit: none scheduled    Labs completed in past 6 months? no  Labs completed in past year?no

## 2021-09-16 ENCOUNTER — TELEMEDICINE (OUTPATIENT)
Dept: FAMILY MEDICINE CLINIC | Facility: CLINIC | Age: 56
End: 2021-09-16

## 2021-09-16 DIAGNOSIS — G89.29 CHRONIC PAIN OF LEFT KNEE: Primary | ICD-10-CM

## 2021-09-16 DIAGNOSIS — G89.29 CHRONIC PAIN OF LEFT KNEE: ICD-10-CM

## 2021-09-16 DIAGNOSIS — M25.562 CHRONIC PAIN OF LEFT KNEE: ICD-10-CM

## 2021-09-16 DIAGNOSIS — G89.29 CHRONIC LOW BACK PAIN WITHOUT SCIATICA, UNSPECIFIED BACK PAIN LATERALITY: ICD-10-CM

## 2021-09-16 DIAGNOSIS — M54.50 CHRONIC LOW BACK PAIN WITHOUT SCIATICA, UNSPECIFIED BACK PAIN LATERALITY: ICD-10-CM

## 2021-09-16 DIAGNOSIS — M25.562 CHRONIC PAIN OF LEFT KNEE: Primary | ICD-10-CM

## 2021-09-16 PROCEDURE — 99213 OFFICE O/P EST LOW 20 MIN: CPT | Performed by: PHYSICIAN ASSISTANT

## 2021-09-16 RX ORDER — OXYCODONE AND ACETAMINOPHEN 10; 325 MG/1; MG/1
1 TABLET ORAL 4 TIMES DAILY
Qty: 120 TABLET | Refills: 0 | Status: SHIPPED | OUTPATIENT
Start: 2021-09-16 | End: 2021-11-16 | Stop reason: SDUPTHER

## 2021-09-16 NOTE — PROGRESS NOTES
Subjective   Yuliana Kennedy is a 55 y.o. female  Med Refill      History of Present Illness  Patient is a pleasant 55-year-old female.Patient presents and consents for telehealth/video visit examination.  Patient presents today for follow-up on chronic pain management with chronic daily opioid therapy due for refills of Percocet.  Patient presents moderately severe pain requiring opioid therapy to allow her to carry out ADLs due to chronic low back pain and chronic knee pain with degenerative disc disease and degenerative joint disease.  She is able to carry out ADLs well with minimal assistance on current medication denies any adverse effects from current medication.  Video visit 15 minutes in length.  The following portions of the patient's history were reviewed and updated as appropriate: allergies, current medications, past social history and problem list    Review of Systems   Constitutional: Negative.    Respiratory: Negative.    Gastrointestinal: Negative.    Genitourinary: Negative.    Musculoskeletal: Positive for arthralgias and back pain. Negative for gait problem and myalgias.        Back and knee pain stable on medication   Neurological: Negative for dizziness, tremors, weakness and numbness.   Psychiatric/Behavioral: Negative.        Objective     There were no vitals filed for this visit.    Physical Exam  Vitals and nursing note reviewed.   Constitutional:       General: She is not in acute distress.     Appearance: Normal appearance. She is well-developed. She is not ill-appearing, toxic-appearing or diaphoretic.   HENT:      Head: Normocephalic and atraumatic.   Musculoskeletal:         General: Tenderness ( knee and  back pain) present. No deformity.   Skin:     General: Skin is warm and dry.      Coloration: Skin is not pale.      Findings: No erythema or rash.   Neurological:      Mental Status: She is alert and oriented to person, place, and time.      Motor: No abnormal muscle tone.       Coordination: Coordination normal.   Psychiatric:         Mood and Affect: Mood normal.         Behavior: Behavior normal.         Assessment/Plan     Diagnoses and all orders for this visit:    1. Chronic pain of left knee (Primary)    2. Chronic low back pain without sciatica, unspecified back pain laterality    Refill will be sent for current dosage of Percocet 10/325 one 4 times daily for chronic pain #120 with zero refills.    As part of this patient's treatment plan, patient will be prescribed controlled substances. The patient has been made aware of appropriate use of such medications, including potential risk of somnolence, limited ability to drive and /or work safely, and potential for dependence or overdose. It has also been made clear that these medications are for use by this patient only, without concomitant use of alcohol or other substances unless prescribed.Controlled substance status of medication discussed with patient, discussed risks of medication including abuse potential and diversion potential and need to follow up for reevaluation appointment in order to receive further refills.    Please note that portions of this document were completed with a voice recognition program. Efforts were made to edit the dictations, but occasionally words are mis-transcribed

## 2021-11-16 ENCOUNTER — TELEPHONE (OUTPATIENT)
Dept: FAMILY MEDICINE CLINIC | Facility: CLINIC | Age: 56
End: 2021-11-16

## 2021-11-16 ENCOUNTER — TELEMEDICINE (OUTPATIENT)
Dept: FAMILY MEDICINE CLINIC | Facility: CLINIC | Age: 56
End: 2021-11-16

## 2021-11-16 DIAGNOSIS — M25.562 CHRONIC PAIN OF LEFT KNEE: ICD-10-CM

## 2021-11-16 DIAGNOSIS — M54.50 CHRONIC LOW BACK PAIN WITHOUT SCIATICA, UNSPECIFIED BACK PAIN LATERALITY: ICD-10-CM

## 2021-11-16 DIAGNOSIS — G89.29 CHRONIC PAIN OF LEFT KNEE: ICD-10-CM

## 2021-11-16 DIAGNOSIS — G89.29 CHRONIC LOW BACK PAIN WITHOUT SCIATICA, UNSPECIFIED BACK PAIN LATERALITY: ICD-10-CM

## 2021-11-16 PROCEDURE — 99213 OFFICE O/P EST LOW 20 MIN: CPT | Performed by: FAMILY MEDICINE

## 2021-11-16 RX ORDER — OXYCODONE AND ACETAMINOPHEN 10; 325 MG/1; MG/1
1 TABLET ORAL 4 TIMES DAILY
Qty: 120 TABLET | Refills: 0 | Status: SHIPPED | OUTPATIENT
Start: 2021-11-16 | End: 2022-01-10 | Stop reason: SDUPTHER

## 2021-11-16 NOTE — TELEPHONE ENCOUNTER
Hub staff attempted to follow warm transfer process and was unsuccessful     Caller: Yuliana Kennedy    Relationship to patient: Self    Best call back number: [phone]    Patient is needing: PATIENT WAS CALLING TO MAKE SURE  WAS CALLING IN HER MEDICATION REFILL BEFORE OFFICE CLOSED    PLEASE ADVISE

## 2021-11-16 NOTE — PROGRESS NOTES
Subjective   Yuliana Kennedy is a 56 y.o. female  Patient requested consents to telemedicine visit using audiovisual aid      Chief Complaint    Chronic knee pain  Opioid dependency    History of Present Illness  Patient presents for telemedicine visit regarding chronic left knee pain and opioid dependency.  She reports that her symptoms remain controlled with her medication allowing her to participate in her activities of daily living which include upkeep of her home.  She reports no adverse effects associated with medication use.  She has been vaccinated for Covid will be due for her booster by the end of this month.    The following portions of the patient's history were reviewed and updated as appropriate: allergies, current medications, past social history and problem list    Review of Systems   Constitutional: Negative.    Respiratory: Negative.    Gastrointestinal: Negative.    Genitourinary: Negative.    Musculoskeletal: Positive for arthralgias, gait problem and myalgias.   Neurological: Negative for dizziness, tremors, weakness and numbness.   Psychiatric/Behavioral: Negative for dysphoric mood. The patient is not nervous/anxious.        Objective     There were no vitals filed for this visit.    Physical Exam  Constitutional:       Appearance: Normal appearance.   HENT:      Head: Normocephalic and atraumatic.   Pulmonary:      Effort: Pulmonary effort is normal. No respiratory distress.   Neurological:      Mental Status: She is alert and oriented to person, place, and time.   Psychiatric:         Mood and Affect: Mood normal.         Behavior: Behavior normal.         Assessment/Plan   Problems Addressed this Visit     None      Visit Diagnoses     Chronic pain of left knee        Relevant Medications    oxyCODONE-acetaminophen (PERCOCET)  MG per tablet    Chronic low back pain without sciatica, unspecified back pain laterality        Relevant Medications    oxyCODONE-acetaminophen (PERCOCET)   MG per tablet      Diagnoses       Codes Comments    Chronic pain of left knee     ICD-10-CM: M25.562, G89.29  ICD-9-CM: 719.46, 338.29     Chronic low back pain without sciatica, unspecified back pain laterality     ICD-10-CM: M54.50, G89.29  ICD-9-CM: 724.2, 338.29         As part of this patient's treatment plan, patient will be prescribed controlled substances. The patient has been made aware of appropriate use of such medications, including potential risk of somnolence, limited ability to drive and /or work safely, and potential for dependence or overdose. It has also been made clear that these medications are for use by this patient only, without concomitant use of alcohol or other substances unless prescribed.Controlled substance status of medication discussed with patient, discussed risks of medication including abuse potential and diversion potential and need to follow up for reevaluation appointment in order to receive further refills.    Part of this note may be an electronic transcription/translation of spoken language to printed text using the Dragon Dictation System.      Telemedicine visit 15 minutes

## 2021-11-17 ENCOUNTER — TELEPHONE (OUTPATIENT)
Dept: FAMILY MEDICINE CLINIC | Facility: CLINIC | Age: 56
End: 2021-11-17

## 2021-11-17 NOTE — TELEPHONE ENCOUNTER
PATIENT CALLED REGARDING HER PAIN MEDICATION (oxyCODONE-acetaminophen (PERCOCET)  MG per tablet) .    THE PHARMACY TOLD HER THAT IT HAS TO HAVE A PRIOR AUTHORIZATION FOR THIS MEDICATION. THE PHARMACY LET HER PICK HER MEDICATION UP BUT SHE HAD TO PAY CASH FOR IT, THEY TOLD HER THAT IF YOU WOULD SEND THEM THE PA AND BACK DATE HER PRESCRIPTION FOR YESTERDAY 11/16/21 WHEN SHE SAW YOU THEY WILL REFUND HER MONEY.    SHE HAS ASKED HER TO LET HER KNOW, SHE STATES MONEY IS TIGHT.    Yuliana Kennedy      [phone]       [address] - [phone]  - [phone] FX

## 2021-12-15 NOTE — TELEPHONE ENCOUNTER
PATIENT CALLED TO FOLLOW UP ON THE REQUEST FOR MEDICATION,   oxyCODONE-acetaminophen (PERCOCET)  MG per tablet,      TO GET A PRIOR AUTHORIZATION AND TO BACK DATE THE PRIOR AUTHORIZATION.    PATIENT STATED SHE WILL BE DUE FOR A REFILL SOON AND DOES NOT HAVE THE INCOME TO PAY $40.  PATIENT STATED SHE WOULD LIKE TO GET REIMBURSED FROM THE PHARMACY, BUT REQUIRES THE PA TO BE BACK DATED.    PATIENT REQUESTS CALL BACK: [phone]    PHARMACY:    [address] - [phone]  - [phone] FX  [phone]

## 2022-01-04 ENCOUNTER — TELEPHONE (OUTPATIENT)
Dept: FAMILY MEDICINE CLINIC | Facility: CLINIC | Age: 57
End: 2022-01-04

## 2022-01-04 RX ORDER — AMOXICILLIN AND CLAVULANATE POTASSIUM 875; 125 MG/1; MG/1
1 TABLET, FILM COATED ORAL 2 TIMES DAILY
Qty: 20 TABLET | Refills: 0 | Status: SHIPPED | OUTPATIENT
Start: 2022-01-04 | End: 2022-01-14

## 2022-01-04 NOTE — TELEPHONE ENCOUNTER
Caller: Yuliana Kennedy    Relationship: Self    Best call back number: [phone]    What medication are you requesting:ANTIBIOTICS     What are your current symptoms: HEADACHES, SINUS PRESSURE IN EYES AND NOSE    How long have you been experiencing symptoms: N/A    Have you had these symptoms before:    [x] Yes  [] No    Have you been treated for these symptoms before:   [x] Yes  [] No    If a prescription is needed, what is your preferred pharmacy and phone number: [address] - [phone] [facility] [phone] FX     Additional notes: PATIENT STATED THAT SHE WOULD LIKE TO SEE ABOUT GETTING MEDICATION SENT IN FOR SINUS INFECTION AND PATIENT DOES HAVE APPOINTMENT WITH BLUES ON 01-11-22

## 2022-01-10 ENCOUNTER — TELEMEDICINE (OUTPATIENT)
Dept: FAMILY MEDICINE CLINIC | Facility: CLINIC | Age: 57
End: 2022-01-10

## 2022-01-10 DIAGNOSIS — M25.562 CHRONIC PAIN OF LEFT KNEE: ICD-10-CM

## 2022-01-10 DIAGNOSIS — J32.9 CHRONIC SINUSITIS, UNSPECIFIED LOCATION: Primary | ICD-10-CM

## 2022-01-10 DIAGNOSIS — G89.29 CHRONIC LOW BACK PAIN WITHOUT SCIATICA, UNSPECIFIED BACK PAIN LATERALITY: ICD-10-CM

## 2022-01-10 DIAGNOSIS — M54.50 CHRONIC LOW BACK PAIN WITHOUT SCIATICA, UNSPECIFIED BACK PAIN LATERALITY: ICD-10-CM

## 2022-01-10 DIAGNOSIS — G89.29 CHRONIC PAIN OF LEFT KNEE: ICD-10-CM

## 2022-01-10 DIAGNOSIS — F41.9 ANXIETY: ICD-10-CM

## 2022-01-10 PROCEDURE — 99213 OFFICE O/P EST LOW 20 MIN: CPT | Performed by: FAMILY MEDICINE

## 2022-01-10 RX ORDER — OXYCODONE AND ACETAMINOPHEN 10; 325 MG/1; MG/1
1 TABLET ORAL 4 TIMES DAILY
Qty: 120 TABLET | Refills: 0 | Status: SHIPPED | OUTPATIENT
Start: 2022-01-10 | End: 2022-02-25 | Stop reason: SDUPTHER

## 2022-01-10 RX ORDER — METHYLPREDNISOLONE 4 MG/1
TABLET ORAL
Qty: 1 EACH | Refills: 0 | Status: SHIPPED | OUTPATIENT
Start: 2022-01-10 | End: 2022-04-04

## 2022-01-10 RX ORDER — ALPRAZOLAM 1 MG/1
1 TABLET ORAL 3 TIMES DAILY
Qty: 90 TABLET | Refills: 5 | Status: SHIPPED | OUTPATIENT
Start: 2022-01-10 | End: 2022-04-07 | Stop reason: SDUPTHER

## 2022-01-10 NOTE — PROGRESS NOTES
Subjective   Yuliana Kennedy is a 56 y.o. female   Patient requested consents to telemedicine visit using audiovisual aid    Chief Complaint    Recurrent sinus infection  Chronic knee pain  Anxiety  Needs medication refills  Requests ENT referral    History of Present Illness  Patient presents today via telemedicine visit complaining of recurring sinus infection symptoms she described over-the-counter medications and nasal lavage.  She has chronic nasal obstruction.  She is already on Augmentin as an antibiotic.  I agree with ENT consultation as she has recurrent versus chronic sinusitis likely exacerbated by her smoking habits.    Patient is due for refill of her oxycodone for her chronic knee pain and also her alprazolam for her chronic anxiety.  Her Claudio is reviewed and is appropriate.    The following portions of the patient's history were reviewed and updated as appropriate: allergies, current medications, past social history and problem list    Review of Systems   Constitutional: Negative for appetite change, chills, fatigue and fever.   HENT: Positive for congestion, ear pain, postnasal drip, rhinorrhea and sinus pressure. Negative for sore throat.    Eyes: Positive for pain.   Respiratory: Positive for cough. Negative for chest tightness and shortness of breath.    Gastrointestinal: Negative for abdominal pain, diarrhea and nausea.   Musculoskeletal: Positive for arthralgias, back pain, gait problem and myalgias.   Neurological: Positive for weakness and headaches. Negative for dizziness, tremors and light-headedness.   Hematological: Negative for adenopathy.   Psychiatric/Behavioral: Positive for dysphoric mood and sleep disturbance. Negative for agitation, behavioral problems, confusion, decreased concentration and suicidal ideas. The patient is nervous/anxious.        Objective     There were no vitals filed for this visit.    Physical Exam  Constitutional:       Appearance: She is ill-appearing.   HENT:       Head: Normocephalic and atraumatic.   Pulmonary:      Effort: Pulmonary effort is normal. No respiratory distress.   Neurological:      Mental Status: She is alert and oriented to person, place, and time.   Psychiatric:         Mood and Affect: Mood normal.         Behavior: Behavior normal.         Assessment/Plan   Problems Addressed this Visit     None      Visit Diagnoses     Chronic sinusitis, unspecified location    -  Primary    Relevant Medications    methylPREDNISolone (MEDROL) 4 MG dose pack    Other Relevant Orders    Ambulatory Referral to ENT (Otolaryngology) (Completed)    Anxiety        Relevant Medications    ALPRAZolam (XANAX) 1 MG tablet    Chronic pain of left knee        Relevant Medications    oxyCODONE-acetaminophen (PERCOCET)  MG per tablet    Chronic low back pain without sciatica, unspecified back pain laterality        Relevant Medications    oxyCODONE-acetaminophen (PERCOCET)  MG per tablet      Diagnoses       Codes Comments    Chronic sinusitis, unspecified location    -  Primary ICD-10-CM: J32.9  ICD-9-CM: 473.9     Anxiety     ICD-10-CM: F41.9  ICD-9-CM: 300.00     Chronic pain of left knee     ICD-10-CM: M25.562, G89.29  ICD-9-CM: 719.46, 338.29     Chronic low back pain without sciatica, unspecified back pain laterality     ICD-10-CM: M54.50, G89.29  ICD-9-CM: 724.2, 338.29         As part of this patient's treatment plan, patient will be prescribed controlled substances. The patient has been made aware of appropriate use of such medications, including potential risk of somnolence, limited ability to drive and /or work safely, and potential for dependence or overdose. It has also been made clear that these medications are for use by this patient only, without concomitant use of alcohol or other substances unless prescribed.Controlled substance status of medication discussed with patient, discussed risks of medication including abuse potential and diversion potential  and need to follow up for reevaluation appointment in order to receive further refills.    Part of this note may be an electronic transcription/translation of spoken language to printed text using the Dragon Dictation System.      Continue and complete course of Augmentin as previously prescribed.    Telemedicine visit 15 minutes

## 2022-02-25 ENCOUNTER — TELEMEDICINE (OUTPATIENT)
Dept: FAMILY MEDICINE CLINIC | Facility: CLINIC | Age: 57
End: 2022-02-25

## 2022-02-25 DIAGNOSIS — M54.50 CHRONIC LOW BACK PAIN WITHOUT SCIATICA, UNSPECIFIED BACK PAIN LATERALITY: Primary | ICD-10-CM

## 2022-02-25 DIAGNOSIS — G89.29 CHRONIC LOW BACK PAIN WITHOUT SCIATICA, UNSPECIFIED BACK PAIN LATERALITY: ICD-10-CM

## 2022-02-25 DIAGNOSIS — G89.29 CHRONIC PAIN OF LEFT KNEE: ICD-10-CM

## 2022-02-25 DIAGNOSIS — M54.50 CHRONIC LOW BACK PAIN WITHOUT SCIATICA, UNSPECIFIED BACK PAIN LATERALITY: ICD-10-CM

## 2022-02-25 DIAGNOSIS — G89.29 CHRONIC LOW BACK PAIN WITHOUT SCIATICA, UNSPECIFIED BACK PAIN LATERALITY: Primary | ICD-10-CM

## 2022-02-25 DIAGNOSIS — M25.562 CHRONIC PAIN OF LEFT KNEE: ICD-10-CM

## 2022-02-25 PROCEDURE — 99213 OFFICE O/P EST LOW 20 MIN: CPT | Performed by: PHYSICIAN ASSISTANT

## 2022-02-25 RX ORDER — OXYCODONE AND ACETAMINOPHEN 10; 325 MG/1; MG/1
1 TABLET ORAL 4 TIMES DAILY
Qty: 120 TABLET | Refills: 0 | Status: SHIPPED | OUTPATIENT
Start: 2022-02-25 | End: 2022-04-07 | Stop reason: SDUPTHER

## 2022-02-25 NOTE — PROGRESS NOTES
Subjective   Yuliana Kennedy is a 56 y.o. female  Back Pain    Patient presents and consents for telehealth/video visit examination.  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   Respiratory: Negative for cough, chest tightness and shortness of breath.    Cardiovascular: Negative for chest pain, palpitations and leg swelling.   Gastrointestinal: Negative for nausea.   Musculoskeletal: Positive for back pain.   Skin: Negative for color change and rash.   Neurological: Negative for dizziness, syncope, weakness and headaches.       Objective     There were no vitals filed for this visit.    Physical Exam  Constitutional:       Appearance: Normal appearance.   Neurological:      Mental Status: She is alert.   Psychiatric:         Mood and Affect: Mood normal.         Behavior: Behavior normal.         Thought Content: Thought content normal.         Judgment: Judgment normal.         Assessment/Plan     Diagnoses and all orders for this visit:    1. Chronic low back pain without sciatica, unspecified back pain laterality (Primary)    Percocet 10/325 1 p.o. 4 times daily #120      As part of this patient's treatment plan, patient will be prescribed controlled substances. The patient has been made aware of appropriate use of such medications, including potential risk of somnolence, limited ability to drive and /or work safely, and potential for dependence or overdose. It has also been made clear that these medications are for  use by this patient only, without concomitant use of alcohol or other substances unless prescribed.Controlled substance status of medication discussed with patient, discussed risks of medication including abuse potential and diversion potential and need to follow up for reevaluation appointment in order to receive further refills.    Part of this note may be an electronic transcription/translation of spoken language to printed text using the Dragon Dictation System.

## 2022-04-04 ENCOUNTER — OFFICE VISIT (OUTPATIENT)
Dept: OTOLARYNGOLOGY | Facility: CLINIC | Age: 57
End: 2022-04-04

## 2022-04-04 VITALS
DIASTOLIC BLOOD PRESSURE: 82 MMHG | SYSTOLIC BLOOD PRESSURE: 136 MMHG | BODY MASS INDEX: 26.52 KG/M2 | HEIGHT: 66 IN | WEIGHT: 165 LBS

## 2022-04-04 DIAGNOSIS — H61.22 IMPACTED CERUMEN OF LEFT EAR: ICD-10-CM

## 2022-04-04 DIAGNOSIS — H65.23 BILATERAL CHRONIC SEROUS OTITIS MEDIA: Primary | ICD-10-CM

## 2022-04-04 DIAGNOSIS — R09.81 NASAL CONGESTION DUE TO PROLONGED USE OF DECONGESTANTS: ICD-10-CM

## 2022-04-04 DIAGNOSIS — Z72.0 TOBACCO USE: ICD-10-CM

## 2022-04-04 DIAGNOSIS — J32.2 CHRONIC ETHMOIDAL SINUSITIS: ICD-10-CM

## 2022-04-04 DIAGNOSIS — T48.5X5A NASAL CONGESTION DUE TO PROLONGED USE OF DECONGESTANTS: ICD-10-CM

## 2022-04-04 PROCEDURE — 69210 REMOVE IMPACTED EAR WAX UNI: CPT | Performed by: OTOLARYNGOLOGY

## 2022-04-04 PROCEDURE — 99204 OFFICE O/P NEW MOD 45 MIN: CPT | Performed by: OTOLARYNGOLOGY

## 2022-04-04 RX ORDER — AZELASTINE 1 MG/ML
2 SPRAY, METERED NASAL 2 TIMES DAILY
Qty: 30 ML | Refills: 5 | Status: SHIPPED | OUTPATIENT
Start: 2022-04-04 | End: 2022-10-06

## 2022-04-04 NOTE — PROGRESS NOTES
ENT Office Consult Note     Date of Consult: 2022     Patient Name: Yuliana Kennedy  MRN: [id number]   : 1965     Referring Provider: Abdoul Crawford*    Care Team: Patient Care Team:  Abdoul Crawford MD as PCP - General (Family Medicine)     Chief Complaint:    Chief Complaint   Patient presents with   • Sinusitis     States she has a lot of drainage and ear infections, she also has migraines a lot. Has been going on all her life.       History of Present Illness: Yuliana Kennedy is a 56 y.o. female who presents today for chronic sinusitis and headaches.  She notes over the last several years she has had 5-6 sinus infections associated with daily headaches.  She has her significant pressure between her eyes and above her eyes.  She has been on multiple antibiotics including Augmentin and cefdinir and others that she cannot recall.  Also been on nasal steroid sprays and decongestants and is currently on Allegra.  She notes no seasonal change to her nasal congestive symptoms which seem to be worse on the left than the right.  She notes that the only medication that works for her right now is called Zicam nasal spray (homeopathic medication without decongestants).  He denies any nasal trauma or surgery.  She does report a 30-pack-year smoking history.    Subjective      Review of Systems:   Review of Systems   I have reviewed and confirmed the accuracy of the ROS as documented by the MA/LPN/RN Jonathan Murphy MD     Pertinent items are noted in HPI.     Past Medical History:   Past Medical History:   Diagnosis Date   • Acute maxillary sinusitis    • Allergic rhinitis    • Chest pain    • Edema extremities    • Fracture of left tibia and fibula    • Generalized anxiety disorder    • GERD (gastroesophageal reflux disease)    • Hiatal hernia    • Hormone replacement therapy    • Pain in joint, shoulder region    • Pain, joint, ankle and foot    • Symptomatic menopausal or female  climacteric states    • Tibial plateau fracture     Left   • Tobacco abuse        Past Surgical History:   Past Surgical History:   Procedure Laterality Date   • CHOLECYSTECTOMY     • COLONOSCOPY N/A 1/18/2017    Procedure: COLONOSCOPY  CPTCODE:58866;  Surgeon: Pb Pritchard III, MD;  Location: [facility] OR;  Service:    • ENDOSCOPY N/A 1/18/2017    Procedure: ESOPHAGOGASTRODUODENOSCOPY WITH BIOPSY  CPTCODE:69690;  Surgeon: Pb Pritchard III, MD;  Location:  [facility] OR;  Service:    • TIBIA / FIBIA LENGTHENING         Family History:   Family History   Problem Relation Age of Onset   • Cancer Mother    • Heart disease Mother    • Cancer Father    • Cancer Maternal Grandfather    • Cancer Paternal Grandfather        Social History:   Social History     Socioeconomic History   • Marital status:    Tobacco Use   • Smoking status: Current Every Day Smoker     Packs/day: 1.50     Years: 35.00     Pack years: 52.50     Types: Cigarettes   • Smokeless tobacco: Never Used   Substance and Sexual Activity   • Alcohol use: Yes     Comment: OCCASIONLLY   • Drug use: No   • Sexual activity: Never       Medications:     Current Outpatient Medications:   •  ALPRAZolam (XANAX) 1 MG tablet, Take 1 tablet by mouth 3 (Three) Times a Day., Disp: 90 tablet, Rfl: 5  •  azelastine (ASTELIN) 0.1 % nasal spray, 2 sprays into the nostril(s) as directed by provider 2 (Two) Times a Day. Use in each nostril as directed, Disp: 30 mL, Rfl: 5  •  fexofenadine (ALLEGRA) 180 MG tablet, Take 1 tablet by mouth Daily. Take 180 mg by mouth Daily., Disp: 30 tablet, Rfl: 11  •  omeprazole (priLOSEC) 40 MG capsule, Take 1 capsule by mouth Every Morning Before Breakfast., Disp: 30 capsule, Rfl: 11  •  oxyCODONE-acetaminophen (PERCOCET)  MG per tablet, Take 1 tablet by mouth 4 (Four) Times a Day. Chronic knee pain, Disp: 120 tablet, Rfl: 0  •  sertraline (ZOLOFT) 100 MG tablet, Take 1 tablet by mouth Daily., Disp: 30 tablet, Rfl:  "11    Allergies:   No Known Allergies    Objective     Physical Exam:  Vital Signs:   Vitals:    04/04/22 1352   BP: 136/82   Weight: 74.8 kg (165 lb)   Height: 167.6 cm (65.98\")     Body mass index is 26.64 kg/m².     General Appearance:  Alert, cooperative, in no acute distress   Head:  Normocephalic, without obvious abnormality, atraumatic   Eyes:          Conjunctivae and sclerae normal, PERRLA   Ears:   Left external auditory canal impacted with cerumen right clear with normal tympanic membrane   Nose:  Septum slightly deviated to the left mild turbinate hypertrophy   Throat:  Erythematous posterior mucosa, torus palatinus, hypopharynx and larynx mildly erythematous but no abnormal lesions, thick mucus secretions noted   Fiberoptic Exam:  N/A   Neck:  No palpable adenopathy or thyromegaly   Pulses: Pulses palpable and equal bilaterally   Skin: No abnormal lesions, bruising or rash   Lymph nodes: No palpable adenopathy   Neurologic: Cranial nerves II - XII grossly intact     Alert and oriented times 3  Gait normal  No nystagmus     Results Review:   Labs:     Imaging: No Images in the past 120 days found..    Procedures    Under microscopic guidance, I did disimpact the left cerumen impaction with suction and a right angled curette her tympanic membrane and external auditory canal appeared normal.  sessment / Plan      Assessment:   Diagnoses and all orders for this visit:    1. Bilateral chronic serous otitis media (Primary)    2. Nasal congestion due to prolonged use of decongestants  -     CT Sinuses Limited Study; Future    3. Chronic ethmoidal sinusitis  -     CT Sinuses Limited Study; Future    4. Tobacco use    5. Impacted cerumen of left ear    Other orders  -     azelastine (ASTELIN) 0.1 % nasal spray; 2 sprays into the nostril(s) as directed by provider 2 (Two) Times a Day. Use in each nostril as directed  Dispense: 30 mL; Refill: 5         Plan:   We discussed smoking cessation  I have added a topical " nasal antihistamine spray for her to use on a regular basis  I will have her scheduled for a CT scan of her sinuses after she has been on the spray for several weeks  Was able to clear the left cerumen impaction in the office today.  I recommended the use of sweet oil for her in the future.      Follow Up:   Return in about 5 weeks (around 5/9/2022) for with Dr Melissa.    Jonathan Murphy MD

## 2022-04-05 ENCOUNTER — TRANSCRIBE ORDERS (OUTPATIENT)
Dept: OTOLARYNGOLOGY | Facility: CLINIC | Age: 57
End: 2022-04-05

## 2022-04-05 DIAGNOSIS — J32.2 CHRONIC ETHMOIDAL SINUSITIS: Primary | ICD-10-CM

## 2022-04-05 DIAGNOSIS — T48.5X5A NASAL CONGESTION DUE TO PROLONGED USE OF DECONGESTANTS: ICD-10-CM

## 2022-04-05 DIAGNOSIS — R09.81 NASAL CONGESTION DUE TO PROLONGED USE OF DECONGESTANTS: ICD-10-CM

## 2022-04-05 DIAGNOSIS — J32.2 SINUSITIS CHRONIC, ETHMOIDAL: Primary | ICD-10-CM

## 2022-04-06 ENCOUNTER — TELEPHONE (OUTPATIENT)
Dept: FAMILY MEDICINE CLINIC | Facility: CLINIC | Age: 57
End: 2022-04-06

## 2022-04-06 DIAGNOSIS — G89.29 CHRONIC PAIN OF LEFT KNEE: ICD-10-CM

## 2022-04-06 DIAGNOSIS — M54.50 CHRONIC LOW BACK PAIN WITHOUT SCIATICA, UNSPECIFIED BACK PAIN LATERALITY: ICD-10-CM

## 2022-04-06 DIAGNOSIS — G89.29 CHRONIC LOW BACK PAIN WITHOUT SCIATICA, UNSPECIFIED BACK PAIN LATERALITY: ICD-10-CM

## 2022-04-06 DIAGNOSIS — M25.562 CHRONIC PAIN OF LEFT KNEE: ICD-10-CM

## 2022-04-06 DIAGNOSIS — F41.9 ANXIETY: ICD-10-CM

## 2022-04-06 RX ORDER — OXYCODONE AND ACETAMINOPHEN 10; 325 MG/1; MG/1
1 TABLET ORAL 4 TIMES DAILY
Qty: 120 TABLET | Refills: 0 | Status: CANCELLED | OUTPATIENT
Start: 2022-04-06

## 2022-04-06 RX ORDER — ALPRAZOLAM 1 MG/1
1 TABLET ORAL 3 TIMES DAILY
Qty: 90 TABLET | Refills: 5 | Status: CANCELLED | OUTPATIENT
Start: 2022-04-06

## 2022-04-06 NOTE — TELEPHONE ENCOUNTER
Caller: Yuliana Kennedy    Relationship: Self    Best call back number: [phone] (H)    What test/procedure requested: MEDICATION-  ALPRAZolam (XANAX) 1 MG tablet  1 mg, 3 Times Daily     oxyCODONE-acetaminophen (PERCOCET)  MG per tablet  1 tablet, 4 Times Daily         When is it needed: PERCOCET IS DUE TOMORROW AND XANAX IS DUE IN TWO WEEKS    PATIENT STATED THE TWO RX ABOVE NEEDS TO HAS A PA ON THEM

## 2022-04-07 ENCOUNTER — TELEMEDICINE (OUTPATIENT)
Dept: FAMILY MEDICINE CLINIC | Facility: CLINIC | Age: 57
End: 2022-04-07

## 2022-04-07 DIAGNOSIS — G89.29 CHRONIC PAIN OF LEFT KNEE: ICD-10-CM

## 2022-04-07 DIAGNOSIS — G89.29 CHRONIC LOW BACK PAIN WITHOUT SCIATICA, UNSPECIFIED BACK PAIN LATERALITY: ICD-10-CM

## 2022-04-07 DIAGNOSIS — M54.50 CHRONIC LOW BACK PAIN WITHOUT SCIATICA, UNSPECIFIED BACK PAIN LATERALITY: ICD-10-CM

## 2022-04-07 DIAGNOSIS — F41.9 ANXIETY: ICD-10-CM

## 2022-04-07 DIAGNOSIS — M25.562 CHRONIC PAIN OF LEFT KNEE: ICD-10-CM

## 2022-04-07 PROCEDURE — 99213 OFFICE O/P EST LOW 20 MIN: CPT | Performed by: FAMILY MEDICINE

## 2022-04-07 RX ORDER — ALPRAZOLAM 1 MG/1
1 TABLET ORAL 3 TIMES DAILY
Qty: 90 TABLET | Refills: 5 | Status: SHIPPED | OUTPATIENT
Start: 2022-04-07 | End: 2022-05-18 | Stop reason: SDUPTHER

## 2022-04-07 RX ORDER — OXYCODONE AND ACETAMINOPHEN 10; 325 MG/1; MG/1
1 TABLET ORAL 4 TIMES DAILY
Qty: 120 TABLET | Refills: 0 | Status: SHIPPED | OUTPATIENT
Start: 2022-04-07 | End: 2022-05-18 | Stop reason: SDUPTHER

## 2022-04-07 NOTE — PROGRESS NOTES
Subjective   Yuliana Kennedy is a 56 y.o. female   Patient requests and consents to telemedicine visit using audiovisual aid    Chief Complaint    Chronic knee pain  Anxiety  Medication refills (has changed pharmacies)    History of Present Illness  Patient presents today for recheck related to her chronic knee pain that is posttraumatic and postsurgical in nature.  She is on chronic opioid therapy for pain relief.  She also has chronic anxiety and is on chronic benzodiazepine for control of her anxiety.  She is due for refills on both medications.  Her Claudio report is reviewed and is appropriate.  She has changed pharmacies.    The following portions of the patient's history were reviewed and updated as appropriate: allergies, current medications, past social history and problem list    Review of Systems   Constitutional: Negative for appetite change and fatigue.   Respiratory: Negative for chest tightness and shortness of breath.    Cardiovascular: Negative for chest pain and palpitations.   Gastrointestinal: Negative for abdominal pain, diarrhea and nausea.   Musculoskeletal: Positive for arthralgias, back pain, gait problem and myalgias.   Allergic/Immunologic: Negative for immunocompromised state.   Neurological: Negative for dizziness, tremors, weakness, light-headedness and headaches.   Psychiatric/Behavioral: Positive for dysphoric mood and sleep disturbance. Negative for agitation, behavioral problems, confusion, decreased concentration and suicidal ideas. The patient is nervous/anxious.        Objective     There were no vitals filed for this visit.    Physical Exam  Constitutional:       Appearance: Normal appearance.   HENT:      Head: Normocephalic and atraumatic.   Pulmonary:      Effort: Pulmonary effort is normal. No respiratory distress.   Neurological:      Mental Status: She is alert and oriented to person, place, and time.   Psychiatric:         Mood and Affect: Mood normal.         Behavior:  Behavior normal.         Assessment/Plan   Problems Addressed this Visit    None     Visit Diagnoses     Anxiety        Relevant Medications    ALPRAZolam (XANAX) 1 MG tablet    Chronic pain of left knee        Relevant Medications    oxyCODONE-acetaminophen (PERCOCET)  MG per tablet    Chronic low back pain without sciatica, unspecified back pain laterality        Relevant Medications    oxyCODONE-acetaminophen (PERCOCET)  MG per tablet      Diagnoses       Codes Comments    Anxiety     ICD-10-CM: F41.9  ICD-9-CM: 300.00     Chronic pain of left knee     ICD-10-CM: M25.562, G89.29  ICD-9-CM: 719.46, 338.29     Chronic low back pain without sciatica, unspecified back pain laterality     ICD-10-CM: M54.50, G89.29  ICD-9-CM: 724.2, 338.29         As part of this patient's treatment plan, patient will be prescribed controlled substances. The patient has been made aware of appropriate use of such medications, including potential risk of somnolence, limited ability to drive and /or work safely, and potential for dependence or overdose. It has also been made clear that these medications are for use by this patient only, without concomitant use of alcohol or other substances unless prescribed.Controlled substance status of medication discussed with patient, discussed risks of medication including abuse potential and diversion potential and need to follow up for reevaluation appointment in order to receive further refills.    Part of this note may be an electronic transcription/translation of spoken language to printed text using the Dragon Dictation System.      Telemedicine visit 25 minutes

## 2022-05-05 ENCOUNTER — APPOINTMENT (OUTPATIENT)
Dept: CT IMAGING | Facility: HOSPITAL | Age: 57
End: 2022-05-05

## 2022-05-18 ENCOUNTER — TELEMEDICINE (OUTPATIENT)
Dept: FAMILY MEDICINE CLINIC | Facility: CLINIC | Age: 57
End: 2022-05-18

## 2022-05-18 ENCOUNTER — TELEPHONE (OUTPATIENT)
Dept: FAMILY MEDICINE CLINIC | Facility: CLINIC | Age: 57
End: 2022-05-18

## 2022-05-18 DIAGNOSIS — M25.562 CHRONIC PAIN OF LEFT KNEE: ICD-10-CM

## 2022-05-18 DIAGNOSIS — F41.9 ANXIETY: Primary | ICD-10-CM

## 2022-05-18 DIAGNOSIS — M54.50 CHRONIC LOW BACK PAIN WITHOUT SCIATICA, UNSPECIFIED BACK PAIN LATERALITY: ICD-10-CM

## 2022-05-18 DIAGNOSIS — J32.9 CHRONIC SINUSITIS, UNSPECIFIED LOCATION: ICD-10-CM

## 2022-05-18 DIAGNOSIS — G89.29 CHRONIC LOW BACK PAIN WITHOUT SCIATICA, UNSPECIFIED BACK PAIN LATERALITY: ICD-10-CM

## 2022-05-18 DIAGNOSIS — G89.29 CHRONIC PAIN OF LEFT KNEE: ICD-10-CM

## 2022-05-18 PROCEDURE — 99213 OFFICE O/P EST LOW 20 MIN: CPT | Performed by: FAMILY MEDICINE

## 2022-05-18 RX ORDER — OXYCODONE AND ACETAMINOPHEN 10; 325 MG/1; MG/1
1 TABLET ORAL 4 TIMES DAILY
Qty: 120 TABLET | Refills: 0 | Status: SHIPPED | OUTPATIENT
Start: 2022-05-18 | End: 2022-06-16 | Stop reason: SDUPTHER

## 2022-05-18 RX ORDER — ALPRAZOLAM 1 MG/1
1 TABLET ORAL 3 TIMES DAILY
Qty: 90 TABLET | Refills: 5 | Status: SHIPPED | OUTPATIENT
Start: 2022-05-18 | End: 2022-10-09 | Stop reason: SDUPTHER

## 2022-05-18 NOTE — TELEPHONE ENCOUNTER
Caller: Yuliana Kennedy    Relationship: Self    Best call back number: [phone] (M)    PATIENT CALLED AND STATED HER   oxyCODONE-acetaminophen (PERCOCET)  MG per tablet  1 tablet, 4 Times Daily     HAS TO HAVE PRIOR AUTHORIZATION. PATIENT IS GOING ON VACATION TOMORROW. PATIENT IS OUT OF MEDICATION.

## 2022-05-18 NOTE — PROGRESS NOTES
Subjective   Yuliana Kennedy is a 56 y.o. female   Patient requested consents to telemedicine visit using audiovisual aid    Chief Complaint    Chronic knee pain  Chronic back pain  Anxiety disorder  Chronic sinusitis    History of Present Illness  Patient presents today via telemedicine visit requesting refills for her pain medication for chronic knee and back pain and also requesting renewal of her Xanax and states that she will need a prior authorization for that.  She is following up with Dr. Melissa in [facility] for her chronic sinusitis.  She is supposed to getting a CT scan of her sinuses prior to her visit with him.  She tells me she is going on vacation to [place] and possibly leaving this coming Sunday.  She is quite excited about this.    The following portions of the patient's history were reviewed and updated as appropriate: allergies, current medications, past social history and problem list    Review of Systems   Constitutional: Negative for appetite change and fatigue.   Respiratory: Negative.  Negative for chest tightness and shortness of breath.    Gastrointestinal: Negative.  Negative for abdominal pain, diarrhea and nausea.   Genitourinary: Negative.    Musculoskeletal: Positive for arthralgias, back pain and gait problem. Negative for myalgias.   Neurological: Positive for weakness. Negative for dizziness, tremors, light-headedness, numbness and headaches.   Psychiatric/Behavioral: Positive for dysphoric mood and sleep disturbance. Negative for agitation, behavioral problems, confusion, decreased concentration and suicidal ideas. The patient is nervous/anxious.        Objective     There were no vitals filed for this visit.    Physical Exam  Constitutional:       Appearance: Normal appearance.   HENT:      Head: Normocephalic and atraumatic.   Eyes:      Conjunctiva/sclera: Conjunctivae normal.      Pupils: Pupils are equal, round, and reactive to light.   Pulmonary:      Effort:  Pulmonary effort is normal. No respiratory distress.   Neurological:      Mental Status: She is alert and oriented to person, place, and time.   Psychiatric:         Mood and Affect: Mood normal.         Behavior: Behavior normal.         Assessment & Plan   Problems Addressed this Visit    None     Visit Diagnoses     Anxiety    -  Primary    Relevant Medications    ALPRAZolam (XANAX) 1 MG tablet    Chronic pain of left knee        Relevant Medications    oxyCODONE-acetaminophen (PERCOCET)  MG per tablet    Chronic low back pain without sciatica, unspecified back pain laterality        Relevant Medications    oxyCODONE-acetaminophen (PERCOCET)  MG per tablet    Chronic sinusitis, unspecified location          Diagnoses       Codes Comments    Anxiety    -  Primary ICD-10-CM: F41.9  ICD-9-CM: 300.00     Chronic pain of left knee     ICD-10-CM: M25.562, G89.29  ICD-9-CM: 719.46, 338.29     Chronic low back pain without sciatica, unspecified back pain laterality     ICD-10-CM: M54.50, G89.29  ICD-9-CM: 724.2, 338.29     Chronic sinusitis, unspecified location     ICD-10-CM: J32.9  ICD-9-CM: 473.9         As part of this patient's treatment plan, patient will be prescribed controlled substances. The patient has been made aware of appropriate use of such medications, including potential risk of somnolence, limited ability to drive and /or work safely, and potential for dependence or overdose. It has also been made clear that these medications are for use by this patient only, without concomitant use of alcohol or other substances unless prescribed.Controlled substance status of medication discussed with patient, discussed risks of medication including abuse potential and diversion potential and need to follow up for reevaluation appointment in order to receive further refills.    Part of this note may be an electronic transcription/translation of spoken language to printed text using the Dragon Dictation  System.

## 2022-05-19 DIAGNOSIS — T48.5X5A NASAL CONGESTION DUE TO PROLONGED USE OF DECONGESTANTS: ICD-10-CM

## 2022-05-19 DIAGNOSIS — R09.81 NASAL CONGESTION DUE TO PROLONGED USE OF DECONGESTANTS: ICD-10-CM

## 2022-05-19 DIAGNOSIS — J32.2 CHRONIC ETHMOIDAL SINUSITIS: Primary | ICD-10-CM

## 2022-05-21 ENCOUNTER — HOSPITAL ENCOUNTER (OUTPATIENT)
Dept: CT IMAGING | Facility: HOSPITAL | Age: 57
Discharge: HOME | End: 2022-05-21
Admitting: OTOLARYNGOLOGY

## 2022-05-21 DIAGNOSIS — J32.2 CHRONIC ETHMOIDAL SINUSITIS: ICD-10-CM

## 2022-05-21 DIAGNOSIS — R09.81 NASAL CONGESTION DUE TO PROLONGED USE OF DECONGESTANTS: ICD-10-CM

## 2022-05-21 DIAGNOSIS — T48.5X5A NASAL CONGESTION DUE TO PROLONGED USE OF DECONGESTANTS: ICD-10-CM

## 2022-05-21 PROCEDURE — 70486 CT MAXILLOFACIAL W/O DYE: CPT

## 2022-05-21 PROCEDURE — 70486 CT MAXILLOFACIAL W/O DYE: CPT | Performed by: RADIOLOGY

## 2022-05-24 DIAGNOSIS — K21.00 GASTROESOPHAGEAL REFLUX DISEASE WITH ESOPHAGITIS: ICD-10-CM

## 2022-05-24 RX ORDER — OMEPRAZOLE 40 MG/1
CAPSULE, DELAYED RELEASE ORAL
Qty: 30 CAPSULE | Refills: 11 | Status: SHIPPED | OUTPATIENT
Start: 2022-05-24

## 2022-05-24 NOTE — TELEPHONE ENCOUNTER
Rx Refill Note  Requested Prescriptions     Pending Prescriptions Disp Refills   • omeprazole (priLOSEC) 40 MG capsule [Pharmacy Med Name: OMEPRAZOLE 40MG CAP 40 Capsule] 30 capsule 0     Sig: TAKE ONE CAPSULE BY MOUTH EVERY MORNING BEFORE BREAKFAST      Last office visit with prescribing clinician:  4/7/2022   Next office visit with prescribing clinician: Visit date not found            Veronica Akhtar MA  05/24/22, 13:19 EDT

## 2022-06-09 ENCOUNTER — OFFICE VISIT (OUTPATIENT)
Dept: OTOLARYNGOLOGY | Facility: CLINIC | Age: 57
End: 2022-06-09

## 2022-06-09 VITALS
DIASTOLIC BLOOD PRESSURE: 76 MMHG | BODY MASS INDEX: 25.23 KG/M2 | WEIGHT: 157 LBS | SYSTOLIC BLOOD PRESSURE: 122 MMHG | HEIGHT: 66 IN

## 2022-06-09 DIAGNOSIS — J34.3 NASAL TURBINATE HYPERTROPHY: ICD-10-CM

## 2022-06-09 DIAGNOSIS — J34.2 NASAL SEPTAL DEVIATION: ICD-10-CM

## 2022-06-09 DIAGNOSIS — J34.89 NASAL VALVE COLLAPSE: Primary | ICD-10-CM

## 2022-06-09 DIAGNOSIS — J30.9 ALLERGIC RHINITIS, UNSPECIFIED SEASONALITY, UNSPECIFIED TRIGGER: ICD-10-CM

## 2022-06-09 DIAGNOSIS — R51.9 FREQUENT HEADACHES: ICD-10-CM

## 2022-06-09 PROCEDURE — 99214 OFFICE O/P EST MOD 30 MIN: CPT | Performed by: OTOLARYNGOLOGY

## 2022-06-09 RX ORDER — FEXOFENADINE HYDROCHLORIDE 180 MG/1
TABLET, FILM COATED ORAL
COMMUNITY
Start: 2022-04-25 | End: 2022-10-06

## 2022-06-09 NOTE — PROGRESS NOTES
ENT Office Consult Note     Date of Consult: 2022     Patient Name: Yuliana Kennedy  MRN: [id number]   : 1965     Referring Provider: No ref. provider found    Care Team: Patient Care Team:  Abdoul Crawford MD as PCP - General (Family Medicine)     Chief Complaint:    Chief Complaint   Patient presents with   • Follow-up     With CT results       History of Present Illness: Yuliana Kennedy is a 56 y.o. female who presents in consultation today for f/u ct sinus.   Facial pain, HA (so severe like migraine)   Reports HO recurrent left OM she thinks from sinus   Nasal congestion at night, more so on left side   Headaches are every day and getting worse   Never seen neurology     Never seen allergy before, never been tested       Subjective      Review of Systems:   Review of Systems   HENT: Negative for congestion, dental problem, drooling, ear discharge, ear pain, facial swelling, hearing loss, mouth sores, nosebleeds, postnasal drip, rhinorrhea, sinus pressure, sneezing, sore throat, swollen glands, tinnitus, trouble swallowing and voice change.    Neurological: Positive for dizziness.      I have reviewed and confirmed the accuracy of the ROS as documented by the MA/LPN/RN Tone Melissa MD     Pertinent items are noted in HPI.     Past Medical History:   Past Medical History:   Diagnosis Date   • Acute maxillary sinusitis    • Allergic rhinitis    • Chest pain    • Edema extremities    • Fracture of left tibia and fibula    • Generalized anxiety disorder    • GERD (gastroesophageal reflux disease)    • Hiatal hernia    • Hormone replacement therapy    • Pain in joint, shoulder region    • Pain, joint, ankle and foot    • Symptomatic menopausal or female climacteric states    • Tibial plateau fracture     Left   • Tobacco abuse        Past Surgical History:   Past Surgical History:   Procedure Laterality Date   • CHOLECYSTECTOMY     • COLONOSCOPY N/A 2017    Procedure:  COLONOSCOPY  CPTCODE:70916;  Surgeon: Pb Pritchard III, MD;  Location: [facility] OR;  Service:    • ENDOSCOPY N/A 1/18/2017    Procedure: ESOPHAGOGASTRODUODENOSCOPY WITH BIOPSY  CPTCODE:13334;  Surgeon: bP Pritchard III, MD;  Location:  [facility] OR;  Service:    • TIBIA / FIBIA LENGTHENING         Family History:   Family History   Problem Relation Age of Onset   • Cancer Mother    • Heart disease Mother    • Cancer Father    • Cancer Maternal Grandfather    • Cancer Paternal Grandfather        Social History:   Social History     Socioeconomic History   • Marital status:    Tobacco Use   • Smoking status: Current Every Day Smoker     Packs/day: 1.50     Years: 35.00     Pack years: 52.50     Types: Cigarettes   • Smokeless tobacco: Never Used   Substance and Sexual Activity   • Alcohol use: Yes     Comment: OCCASIONLLY   • Drug use: No   • Sexual activity: Never       Medications:     Current Outpatient Medications:   •  ALPRAZolam (XANAX) 1 MG tablet, Take 1 tablet by mouth 3 (Three) Times a Day., Disp: 90 tablet, Rfl: 5  •  azelastine (ASTELIN) 0.1 % nasal spray, 2 sprays into the nostril(s) as directed by provider 2 (Two) Times a Day. Use in each nostril as directed, Disp: 30 mL, Rfl: 5  •  fexofenadine (ALLEGRA) 180 MG tablet, Take 1 tablet by mouth Daily. Take 180 mg by mouth Daily., Disp: 30 tablet, Rfl: 11  •  omeprazole (priLOSEC) 40 MG capsule, TAKE ONE CAPSULE BY MOUTH EVERY MORNING BEFORE BREAKFAST, Disp: 30 capsule, Rfl: 11  •  oxyCODONE-acetaminophen (PERCOCET)  MG per tablet, Take 1 tablet by mouth 4 (Four) Times a Day. Chronic knee pain, Disp: 120 tablet, Rfl: 0  •  sertraline (ZOLOFT) 100 MG tablet, Take 1 tablet by mouth Daily., Disp: 30 tablet, Rfl: 11  •  Allergy Relief 180 MG tablet, TAKE ONE TABLET BY MOUTH EVERY DAY, Disp: , Rfl:     Allergies:   No Known Allergies    Objective     Physical Exam:  Vital Signs:   Vitals:    06/09/22 0806   BP: 122/76   BP Location: Right  "arm   Patient Position: Sitting   Cuff Size: Large Adult   Weight: 71.2 kg (157 lb)   Height: 167.6 cm (66\")     Body mass index is 25.34 kg/m².     General Appearance:  healthy-appearing, well-nourished, and in no acute distress  Normal voice quality   Head:  Normocephalic, without obvious abnormality, atraumatic   Sinus: No maxillary tenderness   No frontal tenderness    Salivary Glands: No tenderness of the parotid glands or the submandibular glands and no parotid masses or submandibular masses  Normal saliva expressed from glands   Eyes:          Conjunctivae and sclerae normal, EOMI   Ear -  Left: EAC clear  TM WNL, no perfs, no effusion    Ear - Right:  EAC clear  TM WNL, no perfs, no effusion    Nose: Septum dev right, no lesions   IT hypertrophy bilaterally   No mucosal inflammation or edema   No drainage   Narrow nasal vault   Positive kendrick maneuver bilaterally    Mouth: Lips WNL  MMM  No buccal lesions   Tongue, FOM WNL, no lesions, soft to palpation  Tonsils 2+   No palatal lesions   OP clear, no erythema or exudates   TMJ: No TMJ tenderness, no popping or cracking, symmetric opening   Neck: symmetrical and trachea midline  No thyroid nodules, no thyromegaly    Lungs:   Clear to auscultation, respirations regular, vashti and unlabored  No inc WOB    Heart: Regular rhythm and normal rate   Skin: Warm   Lymph nodes: No palpable cervical adenopathy   Neurologic: Cranial nerves 2 - 12 grossly intact     Alert and oriented   Appropriate mood and affect        Procedure:   Procedures      Assessment / Plan      Assessment/Plan:   Diagnoses and all orders for this visit:    1. Nasal valve collapse (Primary)    2. Nasal septal deviation    3. Nasal turbinate hypertrophy       56-year-old female new patient to me seen today for complaints of chronic headaches.  I reviewed her recent sinus CT scan.  Completely negative for any sinusitis.  I do not think her headaches are coming from her sinuses.  We will put in for " her to see neurology and allergy for further work-up.  We then discussed her chronic nasal congestion at night.  She describes trouble breathing at night that will alternate from side to side as she rolls over in bed.  She does have a right septal deviation, turbinate hypertrophy, and a very narrow nasal vault with positive Humacao maneuver.  We discussed a septoplasty, bilateral inferior turbinate reduction, bilateral repair of nasal valve collapse with copolymer implant to help with her breathing.  She is interested in proceeding.  She understands and we clearly communicated that the point of the surgery is to help her breathing and I can make no guarantees regarding her headaches.    Full risks and benefits of operative versus non-operative intervention have been thoroughly discussed. Risks including but not limited to bleeding, infection, nerve injury, deformity, septal perforation, persistent post operative symptoms, anesthesia complications were all discussed. Understanding was expressed and we will proceed with the operative treatment plan. There were no questions for me at the end of the office visit        Follow Up:   No follow-ups on file.    Tone Melissa MD

## 2022-06-13 ENCOUNTER — TELEPHONE (OUTPATIENT)
Dept: FAMILY MEDICINE CLINIC | Facility: CLINIC | Age: 57
End: 2022-06-13

## 2022-06-13 NOTE — TELEPHONE ENCOUNTER
Patient has scheduled a video visit with Francheska for June 16. Dr. Crawford doesn't have anything available.

## 2022-06-13 NOTE — TELEPHONE ENCOUNTER
Caller: Yuliana Kennedy    Relationship to patient: Self    Best call back number: [phone]    Type of visit: TELEHEALTH    Requested date: ASAP    Additional notes:PATIENT WANTS A TELEHEALTH VISIT WITH DR MORALES TO DISCUSS HER VISIT WITH ENT. PLEASE CALL PATIENT TO SCHEDULE

## 2022-06-15 ENCOUNTER — TELEPHONE (OUTPATIENT)
Dept: FAMILY MEDICINE CLINIC | Facility: CLINIC | Age: 57
End: 2022-06-15

## 2022-06-15 NOTE — TELEPHONE ENCOUNTER
PATIENT IS REQUESTING A CALL FROM PCP (DR ONEILL) SHE WENT TO THE ENT AND THEY HAVE REFERRED HER TO NEUROLOGY AND SHE WANTS HIS ADVICE ON THAT AND IF HE THINKS SHE SHOULD GO SHE WOULD LIKE FOR HIM TO DO THE REFERRAL AND BE SENT CLOSER TO HER HOME.    ALSO THE ENT IS WANTING TO DO SURGERY ON HER NOSE TO REPAIR A THIN LINING THAT WILL HELP HER BREATHE BETTER, SHE WOULD LIKE ADVICE ON THAT AS WELL.    WANTED TO KNOW IF SHE CAN GET A CALL BACK OR BE WORKED IN WITH DR JOHNSON.    SHE DOES HAVE A VIDEO VISIT WITH CAMERON TODAY, BUT STATES SHE NEEDS TO DISCUSS THESE ISSUES WITH DR MORALES.    HOME # [phone]  CELL# [phone]

## 2022-06-16 ENCOUNTER — TELEMEDICINE (OUTPATIENT)
Dept: FAMILY MEDICINE CLINIC | Facility: CLINIC | Age: 57
End: 2022-06-16

## 2022-06-16 ENCOUNTER — TELEPHONE (OUTPATIENT)
Dept: FAMILY MEDICINE CLINIC | Facility: CLINIC | Age: 57
End: 2022-06-16

## 2022-06-16 DIAGNOSIS — G89.29 CHRONIC LOW BACK PAIN WITHOUT SCIATICA, UNSPECIFIED BACK PAIN LATERALITY: ICD-10-CM

## 2022-06-16 DIAGNOSIS — R51.9 CHRONIC NONINTRACTABLE HEADACHE, UNSPECIFIED HEADACHE TYPE: Primary | ICD-10-CM

## 2022-06-16 DIAGNOSIS — M54.50 CHRONIC LOW BACK PAIN WITHOUT SCIATICA, UNSPECIFIED BACK PAIN LATERALITY: ICD-10-CM

## 2022-06-16 DIAGNOSIS — G89.29 CHRONIC NONINTRACTABLE HEADACHE, UNSPECIFIED HEADACHE TYPE: Primary | ICD-10-CM

## 2022-06-16 DIAGNOSIS — F32.A DEPRESSION, UNSPECIFIED DEPRESSION TYPE: ICD-10-CM

## 2022-06-16 DIAGNOSIS — G89.29 CHRONIC PAIN OF LEFT KNEE: ICD-10-CM

## 2022-06-16 DIAGNOSIS — M25.562 CHRONIC PAIN OF LEFT KNEE: ICD-10-CM

## 2022-06-16 DIAGNOSIS — F41.9 ANXIETY: ICD-10-CM

## 2022-06-16 PROCEDURE — 99214 OFFICE O/P EST MOD 30 MIN: CPT | Performed by: PHYSICIAN ASSISTANT

## 2022-06-16 RX ORDER — SERTRALINE HYDROCHLORIDE 100 MG/1
100 TABLET, FILM COATED ORAL DAILY
Qty: 30 TABLET | Refills: 11 | Status: SHIPPED | OUTPATIENT
Start: 2022-06-16 | End: 2022-10-06

## 2022-06-16 RX ORDER — OXYCODONE AND ACETAMINOPHEN 10; 325 MG/1; MG/1
1 TABLET ORAL 4 TIMES DAILY
Qty: 120 TABLET | Refills: 0 | Status: SHIPPED | OUTPATIENT
Start: 2022-06-16 | End: 2022-07-27 | Stop reason: SDUPTHER

## 2022-06-16 NOTE — TELEPHONE ENCOUNTER
RX CALLED AND SAID THAT INSURANCE HAS REJECTED THE PERCOCET REFILL TODAY, SAID THEY NEED A SECONDARY PA DONE BECAUSE OF PATIENT TAKING THIS WITH [employer], SOMETHING TO WITH A THERAPY ALERT.    [phone] [facility]

## 2022-06-16 NOTE — TELEPHONE ENCOUNTER
PATIENT CALLED BACK REGARDING PRIOR AUTHORIZATION FOR HER XANAX, AND PERCOCET AND ALSO PATIENT WOULD LIKE TO KNOW IF IT IS STILL GOING TO BE FOR 6 MONTHS.    [facility] STATED TO PATIENT TO CALL THEM @ [phone]

## 2022-06-16 NOTE — PROGRESS NOTES
Subjective    Yuliana Kennedy is a 56 y.o. female  Med Refill and Knee Pain      History of Present Illness  Patient presents and consents for telehealth/video visit examination.  Patient is a pleasant 56-year-old female who is following up today on generalized anxiety disorder and depression currently stable medication due for refills of Zoloft, following up on chronic pain management on daily opioid therapy due to chronic low back pain and chronic pain of left knee due to degenerative disc disease and degenerative joint disease, currently able to carry out ADLs on medication pain is controlled to tolerable level.  She is also on to discuss her chronic headaches.  She states he was recently seen by ENT who is cleared her of any sinus cause for her headaches and is recommended that she see a neurologist.  She would like to see a neurologist in [facility].  She denies any headache states that she is struggled with headaches for years.  No change in pattern.  Video visit 15 minutes in length today.  The following portions of the patient's history were reviewed and updated as appropriate: allergies, current medications, past social history and problem list    Review of Systems   Constitutional: Negative.  Negative for fatigue and unexpected weight change.   HENT: Negative for congestion, dental problem, postnasal drip, sinus pressure and sore throat.    Eyes: Negative for photophobia, pain and visual disturbance.   Respiratory: Negative.    Gastrointestinal: Negative.  Negative for nausea and vomiting.   Genitourinary: Negative.    Musculoskeletal: Positive for arthralgias and back pain. Negative for gait problem, joint swelling and myalgias.   Skin: Negative.    Neurological: Positive for headaches. Negative for dizziness, tremors, syncope, facial asymmetry, speech difficulty, weakness, light-headedness and numbness.   Psychiatric/Behavioral: Negative.  Negative for agitation, confusion, dysphoric mood and sleep  disturbance. The patient is not nervous/anxious.         Depression and anxiety stable on medication       Objective     There were no vitals filed for this visit.    Physical Exam  Constitutional:       General: She is not in acute distress.     Appearance: Normal appearance. She is well-developed. She is not ill-appearing, toxic-appearing or diaphoretic.   HENT:      Head: Normocephalic and atraumatic.   Eyes:      Conjunctiva/sclera: Conjunctivae normal.   Pulmonary:      Effort: Pulmonary effort is normal. No respiratory distress.   Skin:     General: Skin is dry.      Coloration: Skin is not pale.      Findings: No erythema or rash.   Neurological:      Mental Status: She is alert and oriented to person, place, and time.      Coordination: Coordination normal.   Psychiatric:         Attention and Perception: She is attentive.         Mood and Affect: Mood normal.         Speech: Speech normal.         Behavior: Behavior normal.         Thought Content: Thought content normal.         Judgment: Judgment normal.         Assessment & Plan     Diagnoses and all orders for this visit:    1. Chronic nonintractable headache, unspecified headache type (Primary)  -     Ambulatory Referral to Neurology    2. Depression, unspecified depression type  -     sertraline (ZOLOFT) 100 MG tablet; Take 1 tablet by mouth Daily.  Dispense: 30 tablet; Refill: 11    3. Anxiety  -     sertraline (ZOLOFT) 100 MG tablet; Take 1 tablet by mouth Daily.  Dispense: 30 tablet; Refill: 11    4. Chronic pain of left knee    5. Chronic low back pain without sciatica, unspecified back pain laterality    Will electronically send in a 1 month refill current dosage of Percocet 10/325 1 daily for chronic back and knee #120 no refills follow-up in 1 month for recheck.    As part of this patient's treatment plan, patient will be prescribed controlled substances. The patient has been made aware of appropriate use of such medications, including  potential risk of somnolence, limited ability to drive and /or work safely, and potential for dependence or overdose. It has also been made clear that these medications are for use by this patient only, without concomitant use of alcohol or other substances unless prescribed.Controlled substance status of medication discussed with patient, discussed risks of medication including abuse potential and diversion potential and need to follow up for reevaluation appointment in order to receive further refills.    Part of this note may be an electronic transcription/translation of spoken language to printed text using the Dragon Dictation System.

## 2022-06-17 ENCOUNTER — TELEPHONE (OUTPATIENT)
Dept: FAMILY MEDICINE CLINIC | Facility: CLINIC | Age: 57
End: 2022-06-17

## 2022-06-17 NOTE — TELEPHONE ENCOUNTER
PATIENT CALLED BACK TO CHECK ON THE STATUS OF A SECONDAY PA FOR oxyCODONE-acetaminophen (PERCOCET)  MG per tablet AND ALSO SEEING IF ONE IS NEEDED FOR ALPRAZolam (XANAX) 1 MG tablet.     PLEASE ADVISE AND CALL PATIENT AS THE WEEKEND IS HERE AND SHE NEEDS HER MEDICATIONS ASAP [phone]

## 2022-06-17 NOTE — TELEPHONE ENCOUNTER
Her PA is pending I can not move the process along any faster, I tried calling the number provided and was unable to get anything resolved on if there was an additional PA needed. Patient can use a good rx coupon if needed and out of her medication to reduce the cost for out of pocket, notified patient verbally

## 2022-06-17 NOTE — TELEPHONE ENCOUNTER
Patient is calling to check on status of secondary PA so she can get her medication.        PLEASE CALL PATIENT [phone], [phone]

## 2022-06-17 NOTE — TELEPHONE ENCOUNTER
Christopher called and advised that a PA was needed for her percocet. Advised Christopher that a PA had been sent and is processing by her insurance. Advised that there is nothing further we can do at this time but wait on insurance. Christopher said that they would have the patient call us.

## 2022-06-22 DIAGNOSIS — R51.9 FREQUENT HEADACHES: Primary | ICD-10-CM

## 2022-07-14 PROBLEM — J34.2 NASAL SEPTAL DEVIATION: Status: ACTIVE | Noted: 2022-07-14

## 2022-07-14 PROBLEM — J34.89 NASAL VALVE COLLAPSE: Status: ACTIVE | Noted: 2022-07-14

## 2022-07-14 PROBLEM — M95.0 NASAL VALVE COLLAPSE: Status: ACTIVE | Noted: 2022-07-14

## 2022-07-14 PROBLEM — J34.829 NASAL VALVE COLLAPSE: Status: ACTIVE | Noted: 2022-07-14

## 2022-07-14 PROBLEM — J34.3 NASAL TURBINATE HYPERTROPHY: Status: ACTIVE | Noted: 2022-07-14

## 2022-07-20 ENCOUNTER — TELEPHONE (OUTPATIENT)
Dept: FAMILY MEDICINE CLINIC | Facility: CLINIC | Age: 57
End: 2022-07-20

## 2022-07-20 NOTE — TELEPHONE ENCOUNTER
Caller: Yuliana Kennedy    Relationship to patient: Self    Best call back number:[phone]    Chief complaint: MEDICATION REFILL    Type of visit: TELEHEALTH    Requested date: ANY DAY OR TIME TOWARDS END OF THE MONTH    Additional notes:PATIENT WANTS TELEHEALTH VISIT AND THERE WERE NONE AVAILABLE. PLEASE ADVISE AND CALL PATIENT BACK

## 2022-07-27 ENCOUNTER — TELEMEDICINE (OUTPATIENT)
Dept: FAMILY MEDICINE CLINIC | Facility: CLINIC | Age: 57
End: 2022-07-27

## 2022-07-27 DIAGNOSIS — G89.29 CHRONIC PAIN OF LEFT KNEE: ICD-10-CM

## 2022-07-27 DIAGNOSIS — M25.562 CHRONIC PAIN OF LEFT KNEE: ICD-10-CM

## 2022-07-27 DIAGNOSIS — M54.50 CHRONIC LOW BACK PAIN WITHOUT SCIATICA, UNSPECIFIED BACK PAIN LATERALITY: ICD-10-CM

## 2022-07-27 DIAGNOSIS — G89.29 CHRONIC LOW BACK PAIN WITHOUT SCIATICA, UNSPECIFIED BACK PAIN LATERALITY: ICD-10-CM

## 2022-07-27 PROCEDURE — 99213 OFFICE O/P EST LOW 20 MIN: CPT | Performed by: FAMILY MEDICINE

## 2022-07-27 RX ORDER — OXYCODONE AND ACETAMINOPHEN 10; 325 MG/1; MG/1
1 TABLET ORAL 4 TIMES DAILY
Qty: 120 TABLET | Refills: 0 | Status: SHIPPED | OUTPATIENT
Start: 2022-07-27 | End: 2022-08-24 | Stop reason: SDUPTHER

## 2022-08-01 ENCOUNTER — TELEPHONE (OUTPATIENT)
Dept: PREADMISSION TESTING | Facility: HOSPITAL | Age: 57
End: 2022-08-01

## 2022-08-02 ENCOUNTER — APPOINTMENT (OUTPATIENT)
Dept: PREADMISSION TESTING | Facility: HOSPITAL | Age: 57
End: 2022-08-02

## 2022-08-21 NOTE — PROGRESS NOTES
Subjective   Yuliana Kennedy is a 56 y.o. female   Patient requests and consents to telemedicine visit using audiovisual aid    Chief Complaint    Chronic knee pain  Chronic back pain    History of Present Illness     Patient presents via telemedicine visit for recheck related to chronic knee and chronic back pain.  She is on chronic opioid therapy.  Her Claudio is reviewed and is appropriate.  She is due for refills.  She has no new or acute complaints.  She tells me she has upcoming sinus surgery scheduled with ear nose and throat to be done in [facility].        The following portions of the patient's history were reviewed and updated as appropriate: allergies, current medications, past social history and problem list    Review of Systems   Constitutional: Negative.    HENT: Positive for congestion, postnasal drip, rhinorrhea, sinus pressure and sinus pain.    Respiratory: Negative.    Gastrointestinal: Negative.    Musculoskeletal: Positive for arthralgias, back pain and gait problem. Negative for myalgias.   Neurological: Negative for dizziness, tremors, weakness and numbness.   Psychiatric/Behavioral: Negative for behavioral problems and dysphoric mood. The patient is not nervous/anxious.        Objective     There were no vitals filed for this visit.    Physical Exam  Constitutional:       Appearance: Normal appearance.   HENT:      Head: Normocephalic and atraumatic.   Pulmonary:      Effort: Pulmonary effort is normal. No respiratory distress.   Neurological:      Mental Status: She is alert and oriented to person, place, and time.   Psychiatric:         Mood and Affect: Mood normal.         Behavior: Behavior normal.         Assessment & Plan   Problems Addressed this Visit    None     Visit Diagnoses     Chronic pain of left knee        Relevant Medications    oxyCODONE-acetaminophen (PERCOCET)  MG per tablet    Chronic low back pain without sciatica, unspecified back pain laterality         Relevant Medications    oxyCODONE-acetaminophen (PERCOCET)  MG per tablet      Diagnoses       Codes Comments    Chronic pain of left knee     ICD-10-CM: M25.562, G89.29  ICD-9-CM: 719.46, 338.29     Chronic low back pain without sciatica, unspecified back pain laterality     ICD-10-CM: M54.50, G89.29  ICD-9-CM: 724.2, 338.29         As part of this patient's treatment plan, patient will be prescribed controlled substances. The patient has been made aware of appropriate use of such medications, including potential risk of somnolence, limited ability to drive and /or work safely, and potential for dependence or overdose. It has also been made clear that these medications are for use by this patient only, without concomitant use of alcohol or other substances unless prescribed.Controlled substance status of medication discussed with patient, discussed risks of medication including abuse potential and diversion potential and need to follow up for reevaluation appointment in order to receive further refills.    Part of this note may be an electronic transcription/translation of spoken language to printed text using the Dragon Dictation System.

## 2022-08-24 ENCOUNTER — TELEMEDICINE (OUTPATIENT)
Dept: FAMILY MEDICINE CLINIC | Facility: CLINIC | Age: 57
End: 2022-08-24

## 2022-08-24 DIAGNOSIS — G89.29 CHRONIC LOW BACK PAIN WITHOUT SCIATICA, UNSPECIFIED BACK PAIN LATERALITY: ICD-10-CM

## 2022-08-24 DIAGNOSIS — G89.29 CHRONIC PAIN OF LEFT KNEE: ICD-10-CM

## 2022-08-24 DIAGNOSIS — G89.29 CHRONIC PAIN OF LEFT KNEE: Primary | ICD-10-CM

## 2022-08-24 DIAGNOSIS — M25.562 CHRONIC PAIN OF LEFT KNEE: ICD-10-CM

## 2022-08-24 DIAGNOSIS — M54.50 CHRONIC LOW BACK PAIN WITHOUT SCIATICA, UNSPECIFIED BACK PAIN LATERALITY: ICD-10-CM

## 2022-08-24 DIAGNOSIS — M25.562 CHRONIC PAIN OF LEFT KNEE: Primary | ICD-10-CM

## 2022-08-24 PROCEDURE — 99213 OFFICE O/P EST LOW 20 MIN: CPT | Performed by: PHYSICIAN ASSISTANT

## 2022-08-24 RX ORDER — OXYCODONE AND ACETAMINOPHEN 10; 325 MG/1; MG/1
1 TABLET ORAL 4 TIMES DAILY
Qty: 120 TABLET | Refills: 0 | Status: SHIPPED | OUTPATIENT
Start: 2022-08-24 | End: 2022-10-09 | Stop reason: SDUPTHER

## 2022-08-24 NOTE — PROGRESS NOTES
Subjective   Yuliana Kennedy is a 56 y.o. female  Med Refill      History of Present Illness  Patient presents and consents for telehealth/video visit examination.  Patient presents today via virtual visit for follow-up on chronic pain management in association with chronic knee pain due to severe degenerative joint disease.  She is currently taking Percocet 4 times daily to reduce pain to a tolerable level to be able to carry out ADLs.  Tolerating medication well.  Without the medication she is unable to carry out ADLs.  Denies any adverse effects of medication.  Due for refills.  Video visit 15 minutes in length.  The following portions of the patient's history were reviewed and updated as appropriate: allergies, current medications, past social history and problem list    Review of Systems   Constitutional: Negative for activity change.   Respiratory: Negative.    Cardiovascular: Negative.    Musculoskeletal: Positive for arthralgias, gait problem and myalgias. Negative for joint swelling.   Skin: Negative.    Neurological: Negative for weakness and numbness.   Psychiatric/Behavioral: Negative.        Objective     There were no vitals filed for this visit.    Physical Exam  Constitutional:       General: She is not in acute distress.     Appearance: Normal appearance. She is well-developed. She is not ill-appearing, toxic-appearing or diaphoretic.   HENT:      Head: Normocephalic and atraumatic.   Eyes:      Conjunctiva/sclera: Conjunctivae normal.   Pulmonary:      Effort: Pulmonary effort is normal. No respiratory distress.   Skin:     General: Skin is dry.      Coloration: Skin is not pale.      Findings: No erythema or rash.   Neurological:      Mental Status: She is alert and oriented to person, place, and time.      Coordination: Coordination normal.   Psychiatric:         Attention and Perception: She is attentive.         Mood and Affect: Mood normal.         Speech: Speech normal.         Behavior:  Behavior normal.         Thought Content: Thought content normal.         Judgment: Judgment normal.         Assessment & Plan     Diagnoses and all orders for this visit:    1. Chronic pain of left knee (Primary)    Will electronically send 1 month prescription of current dosage of Percocet 10/320 514 times daily for chronic pain dispense number 120.  Discussed with patient the need for her to schedule her next appointment in office, in person for physical exam before further refills of Percocet can be given.  Patient is agreeable to this and will schedule to come in person for a checkup next month.  We will update controlled substance agreement at that time    As part of this patient's treatment plan, patient will be prescribed controlled substances. The patient has been made aware of appropriate use of such medications, including potential risk of somnolence, limited ability to drive and /or work safely, and potential for dependence or overdose. It has also been made clear that these medications are for use by this patient only, without concomitant use of alcohol or other substances unless prescribed.Controlled substance status of medication discussed with patient, discussed risks of medication including abuse potential and diversion potential and need to follow up for reevaluation appointment in order to receive further refills.    Part of this note may be an electronic transcription/translation of spoken language to printed text using the Dragon Dictation System.

## 2022-10-06 ENCOUNTER — OFFICE VISIT (OUTPATIENT)
Dept: NEUROLOGY | Facility: CLINIC | Age: 57
End: 2022-10-06

## 2022-10-06 ENCOUNTER — OFFICE VISIT (OUTPATIENT)
Dept: FAMILY MEDICINE CLINIC | Facility: CLINIC | Age: 57
End: 2022-10-06

## 2022-10-06 VITALS
WEIGHT: 158.6 LBS | OXYGEN SATURATION: 96 % | HEART RATE: 83 BPM | DIASTOLIC BLOOD PRESSURE: 80 MMHG | TEMPERATURE: 96.6 F | BODY MASS INDEX: 25.49 KG/M2 | HEIGHT: 66 IN | SYSTOLIC BLOOD PRESSURE: 110 MMHG

## 2022-10-06 VITALS
OXYGEN SATURATION: 100 % | SYSTOLIC BLOOD PRESSURE: 144 MMHG | BODY MASS INDEX: 25.39 KG/M2 | TEMPERATURE: 96.5 F | DIASTOLIC BLOOD PRESSURE: 88 MMHG | WEIGHT: 158 LBS | RESPIRATION RATE: 15 BRPM | HEART RATE: 85 BPM | HEIGHT: 66 IN

## 2022-10-06 DIAGNOSIS — G89.29 CHRONIC PAIN OF LEFT KNEE: Primary | ICD-10-CM

## 2022-10-06 DIAGNOSIS — G43.011 INTRACTABLE MIGRAINE WITHOUT AURA AND WITH STATUS MIGRAINOSUS: ICD-10-CM

## 2022-10-06 DIAGNOSIS — M54.50 CHRONIC LOW BACK PAIN WITHOUT SCIATICA, UNSPECIFIED BACK PAIN LATERALITY: ICD-10-CM

## 2022-10-06 DIAGNOSIS — R51.9 SINUS HEADACHE: Primary | ICD-10-CM

## 2022-10-06 DIAGNOSIS — K21.00 GASTROESOPHAGEAL REFLUX DISEASE WITH ESOPHAGITIS, UNSPECIFIED WHETHER HEMORRHAGE: ICD-10-CM

## 2022-10-06 DIAGNOSIS — G89.29 CHRONIC NONINTRACTABLE HEADACHE, UNSPECIFIED HEADACHE TYPE: ICD-10-CM

## 2022-10-06 DIAGNOSIS — R42 DIZZINESS: ICD-10-CM

## 2022-10-06 DIAGNOSIS — Z51.81 ENCOUNTER FOR THERAPEUTIC DRUG MONITORING: ICD-10-CM

## 2022-10-06 DIAGNOSIS — G89.29 CHRONIC LOW BACK PAIN WITHOUT SCIATICA, UNSPECIFIED BACK PAIN LATERALITY: ICD-10-CM

## 2022-10-06 DIAGNOSIS — F41.9 ANXIETY: ICD-10-CM

## 2022-10-06 DIAGNOSIS — M25.562 CHRONIC PAIN OF LEFT KNEE: ICD-10-CM

## 2022-10-06 DIAGNOSIS — G89.29 CHRONIC PAIN OF LEFT KNEE: ICD-10-CM

## 2022-10-06 DIAGNOSIS — M25.562 CHRONIC PAIN OF LEFT KNEE: Primary | ICD-10-CM

## 2022-10-06 DIAGNOSIS — F17.219 CIGARETTE NICOTINE DEPENDENCE WITH NICOTINE-INDUCED DISORDER: ICD-10-CM

## 2022-10-06 DIAGNOSIS — R51.9 CHRONIC NONINTRACTABLE HEADACHE, UNSPECIFIED HEADACHE TYPE: ICD-10-CM

## 2022-10-06 PROCEDURE — 99214 OFFICE O/P EST MOD 30 MIN: CPT | Performed by: FAMILY MEDICINE

## 2022-10-06 PROCEDURE — 99203 OFFICE O/P NEW LOW 30 MIN: CPT | Performed by: NURSE PRACTITIONER

## 2022-10-06 RX ORDER — SUMATRIPTAN 50 MG/1
50 TABLET, FILM COATED ORAL ONCE AS NEEDED
Qty: 9 TABLET | Refills: 2 | Status: SHIPPED | OUTPATIENT
Start: 2022-10-06 | End: 2023-01-10 | Stop reason: SDUPTHER

## 2022-10-06 RX ORDER — MIRTAZAPINE 15 MG/1
15 TABLET, ORALLY DISINTEGRATING ORAL NIGHTLY
COMMUNITY
End: 2022-10-09

## 2022-10-06 RX ORDER — CYPROHEPTADINE HYDROCHLORIDE 4 MG/1
4 TABLET ORAL EVERY EVENING
Qty: 90 TABLET | Refills: 1 | Status: SHIPPED | OUTPATIENT
Start: 2022-10-06 | End: 2022-12-06 | Stop reason: SDUPTHER

## 2022-10-06 NOTE — PROGRESS NOTES
Subjective   Yuliana Kennedy is a 57 y.o. female seen today for Anxiety (Here for f/u, refill Xanax, Percocet and would like to come off of Zoloft) and Knee Pain.         Chief Complaint  Chronic left knee pain  Chronic low back pain  Anxiety and depression  Headaches  Gastroesophageal reflux disease    History of Present Illness     The patient presents today for a follow-up regarding her chronic pain issues and medication refills.     She reports she has been better, but she is okay. The patient is going to change her Zoloft, which is what works best for her, but it gives her heartburn so bad. The patient took 3 Prilosec. She said it usually 1 works within 5 to 10 minutes. The patient was trying to make herself burp. She sat straight up in bed and put a bunch of pillows in bed and sat up. The patient finally went to sleep and took Xanax. She woke up at 3:00 AM and it was not as bad, but it was still bothering her. The patient did not think she was going to be able to make a trip to the emergency room. She has noticed that a lot of times when she takes the Zoloft, she will get sharp pains in her ear. The patient said it feels like she is having a heart attack with the ringing in her ear.    She reports she has been sick for 1.5 weeks and had a Z-Myron. The patient said it started with her gum being sore and then it went to her lips chapping and then it went to her tongue. She said it felt like her tongue was burnt. The patient said it is better, except it is still a little bit sore. She has an appointment with a neurologist today.    The following portions of the patient's history were reviewed and updated as appropriate: allergies, current medications, past social history and problem list.    Review of Systems   Constitutional: Positive for activity change. Negative for appetite change, fatigue and unexpected weight change.   HENT: Negative for congestion, dental problem, postnasal drip, sinus pressure and sore  "throat.    Eyes: Negative for photophobia, pain and visual disturbance.   Respiratory: Negative.  Negative for chest tightness and shortness of breath.    Cardiovascular: Negative for chest pain.   Gastrointestinal: Negative.  Negative for abdominal distention, abdominal pain, diarrhea, nausea and vomiting.        Patient experiencing heartburn/acid reflux     Genitourinary: Negative.    Musculoskeletal: Positive for arthralgias, back pain and myalgias. Negative for gait problem and joint swelling.   Skin: Negative.    Neurological: Negative for dizziness, tremors, syncope, facial asymmetry, speech difficulty, weakness, light-headedness, numbness and headaches.   Psychiatric/Behavioral: Positive for dysphoric mood and sleep disturbance. Negative for agitation, behavioral problems, confusion, decreased concentration and suicidal ideas. The patient is nervous/anxious.        Objective   /88   Pulse 85   Temp 96.5 °F (35.8 °C)   Resp 15   Ht 167.6 cm (66\")   Wt 71.7 kg (158 lb)   SpO2 100%   BMI 25.50 kg/m²   Physical Exam  Vitals and nursing note reviewed.   Constitutional:       Appearance: Normal appearance. She is well-developed.   HENT:      Head: Normocephalic and atraumatic.   Eyes:      Conjunctiva/sclera: Conjunctivae normal.      Pupils: Pupils are equal, round, and reactive to light.   Cardiovascular:      Rate and Rhythm: Normal rate and regular rhythm.   Pulmonary:      Effort: Pulmonary effort is normal.      Breath sounds: Normal breath sounds.   Abdominal:      General: Bowel sounds are normal.      Palpations: Abdomen is soft.   Musculoskeletal:      Lumbar back: Pain, tenderness and bony tenderness present. No swelling, deformity or spasms. Decreased range of motion.      Right knee: Normal.      Left knee: Deformity and bony tenderness present. No crepitus. Decreased range of motion. No LCL laxity, MCL laxity or ACL laxity.     Right lower leg: No edema.      Left lower leg: No edema. "   Skin:     General: Skin is warm and dry.   Neurological:      Mental Status: She is alert and oriented to person, place, and time.      Deep Tendon Reflexes: Reflexes are normal and symmetric.   Psychiatric:         Mood and Affect: Mood normal.         Behavior: Behavior normal.         Thought Content: Thought content normal.         Judgment: Judgment normal.         Assessment & Plan   Problems Addressed this Visit        Gastrointestinal Abdominal     GERD (gastroesophageal reflux disease)   Other Visit Diagnoses     Chronic pain of left knee    -  Primary    Relevant Medications    oxyCODONE-acetaminophen (PERCOCET)  MG per tablet    Anxiety        Relevant Medications    ALPRAZolam (XANAX) 1 MG tablet    Chronic low back pain without sciatica, unspecified back pain laterality        Relevant Medications    oxyCODONE-acetaminophen (PERCOCET)  MG per tablet    Chronic nonintractable headache, unspecified headache type        Encounter for therapeutic drug monitoring        Relevant Orders    Compliance Drug Analysis, Ur - Urine, Clean Catch      Diagnoses       Codes Comments    Chronic pain of left knee    -  Primary ICD-10-CM: M25.562, G89.29  ICD-9-CM: 719.46, 338.29     Anxiety     ICD-10-CM: F41.9  ICD-9-CM: 300.00     Chronic low back pain without sciatica, unspecified back pain laterality     ICD-10-CM: M54.50, G89.29  ICD-9-CM: 724.2, 338.29     Chronic nonintractable headache, unspecified headache type     ICD-10-CM: R51.9, G89.29  ICD-9-CM: 784.0     Gastroesophageal reflux disease with esophagitis, unspecified whether hemorrhage     ICD-10-CM: K21.00  ICD-9-CM: 530.11     Encounter for therapeutic drug monitoring     ICD-10-CM: Z51.81  ICD-9-CM: V58.83         I spent 30 minutes in patient care: Reviewing records prior to the visit, examining the patient, entering orders and documentation    Part of this note may be an electronic transcription/translation of spoken language to printed  text using the Dragon Dictation System.         Transcribed from ambient dictation for R Jovi Crawford MD by Neda Torres.  10/06/22   13:40 EDT    I have personally performed the services described in this document as transcribed by the above individual, and it is both accurate and complete.  Patient verbalized consent to the visit recording.

## 2022-10-06 NOTE — TELEPHONE ENCOUNTER
Caller: Yuliana Kennedy SHELBIE    Relationship: Self    Best call back number: [phone]    Requested Prescriptions:   Requested Prescriptions     Pending Prescriptions Disp Refills   • mirtazapine (REMERON SOL-TAB) 15 MG disintegrating tablet       Sig: Place 1 tablet on the tongue Every Night.   • ALPRAZolam (XANAX) 1 MG tablet 90 tablet 5     Sig: Take 1 tablet by mouth 3 (Three) Times a Day.        Pharmacy where request should be sent: [id number] - [place], [place] - [address] [facility] [phone] [phone]      Additional details provided by patient: SHE WAS IN FOR AN APPOINTMENT TODAY AND HER MEDICATION HAS NOT BEEN CALLED IN . SHE ALSO NEEDED A REFILL ON THE PERCOCET. I DO NOT SEE IT ON HER MED LIST.    OR CALL HOME THE HOME NUMBER IF SHE DOESN'T ANSWER THE CELL TO LEAVE A  [phone]    Does the patient have less than a 3 day supply:  [x] Yes  [] No    Mary Anne Ware, PCT   10/06/22 15:47 EDT

## 2022-10-06 NOTE — PROGRESS NOTES
New Neurology Patient Office Visit      Patient Name: Yuliana Kennedy    Referring Physician: Tone Melissa MD    Chief Complaint:    Chief Complaint   Patient presents with   • Advice Only     New pt in office to establish care for headaches       History of Present Illness: Yuliana Kennedy is a 57 y.o. female who is here today to establish care with Neurology.  She was referred for evaluation and treatment of headaches.     Age onset: teenage years    Headache description: nasal, behind eyes and around brows. - nausea, has to lay down in a dark room with pillow over her head    HAD/week: varies, more common over the past year.     Meds tried: rizatriptan, allergy medicines    Cannot identify triggers or than stress    Previous imaging: yes, head CT    Family Hx? Unaware    Active daily smoker, wearing Bic lighter on lanyard    The following portions of the patient's history were reviewed and updated as appropriate: allergies, current medications, past family history, past medical history, past social history, past surgical history and problem list.    Subjective      Review of Systems:   Review of Systems   HENT: Positive for congestion, rhinorrhea and sinus pressure.    Neurological: Positive for headache.   All other systems reviewed and are negative.    Past Medical History:   Past Medical History:   Diagnosis Date   • Acute maxillary sinusitis    • Allergic rhinitis    • Chest pain    • Edema extremities    • Fracture of left tibia and fibula    • Generalized anxiety disorder    • GERD (gastroesophageal reflux disease)    • Hiatal hernia    • Hormone replacement therapy    • Pain in joint, shoulder region    • Pain, joint, ankle and foot    • Symptomatic menopausal or female climacteric states    • Tibial plateau fracture     Left   • Tobacco abuse      Past Surgical History:   Past Surgical History:   Procedure Laterality Date   • CHOLECYSTECTOMY     • COLONOSCOPY N/A 1/18/2017    Procedure:  COLONOSCOPY  CPTCODE:99737;  Surgeon: Pb Pritchard III, MD;  Location: [facility] OR;  Service:    • ENDOSCOPY N/A 1/18/2017    Procedure: ESOPHAGOGASTRODUODENOSCOPY WITH BIOPSY  CPTCODE:65621;  Surgeon: Pb Pritchard III, MD;  Location:  [facility] OR;  Service:    • TIBIA / FIBIA LENGTHENING       Family History:   Family History   Problem Relation Age of Onset   • Cancer Mother    • Heart disease Mother    • Cancer Father    • Cancer Maternal Grandfather    • Cancer Paternal Grandfather      Social History:   Social History     Socioeconomic History   • Marital status:    Tobacco Use   • Smoking status: Current Every Day Smoker     Packs/day: 1.50     Years: 35.00     Pack years: 52.50     Types: Cigarettes   • Smokeless tobacco: Never Used   Substance and Sexual Activity   • Alcohol use: Yes     Comment: OCCASIONLLY   • Drug use: No   • Sexual activity: Never       Medications:     Current Outpatient Medications:   •  ALPRAZolam (XANAX) 1 MG tablet, Take 1 tablet by mouth 3 (Three) Times a Day., Disp: 90 tablet, Rfl: 5  •  fexofenadine (ALLEGRA) 180 MG tablet, Take 1 tablet by mouth Daily. Take 180 mg by mouth Daily., Disp: 30 tablet, Rfl: 11  •  mirtazapine (REMERON SOL-TAB) 15 MG disintegrating tablet, Place 15 mg on the tongue Every Night., Disp: , Rfl:   •  omeprazole (priLOSEC) 40 MG capsule, TAKE ONE CAPSULE BY MOUTH EVERY MORNING BEFORE BREAKFAST, Disp: 30 capsule, Rfl: 11  •  oxyCODONE-acetaminophen (PERCOCET)  MG per tablet, Take 1 tablet by mouth 4 (Four) Times a Day. Chronic knee pain, Disp: 120 tablet, Rfl: 0  •  cyproheptadine (PERIACTIN) 4 MG tablet, Take 1 tablet by mouth Every Evening., Disp: 90 tablet, Rfl: 1  •  SUMAtriptan (Imitrex) 50 MG tablet, Take 1 tablet by mouth 1 (One) Time As Needed for Migraine. May repeat dose one time in 2 hours if headache not relieved. DNE 2 pills in 24 hours., Disp: 9 tablet, Rfl: 2    Allergies:   No Known Allergies    Objective     Physical  "Exam:  Vital Signs:   Vitals:    10/06/22 1409   BP: 110/80   BP Location: Right arm   Patient Position: Sitting   Cuff Size: Adult   Pulse: 83   Temp: 96.6 °F (35.9 °C)   SpO2: 96%   Weight: 71.9 kg (158 lb 9.6 oz)   Height: 167.6 cm (66\")   PainSc: 0-No pain     Physical Exam  Vitals and nursing note reviewed.   Constitutional:       Appearance: Normal appearance.   HENT:      Nose: Septal deviation and nasal tenderness present.   Eyes:      Extraocular Movements: EOM normal.      Pupils: Pupils are equal, round, and reactive to light.   Neck:      Vascular: No carotid bruit.   Cardiovascular:      Rate and Rhythm: Regular rhythm.      Heart sounds: Normal heart sounds.   Neurological:      General: No focal deficit present.      Mental Status: She is oriented to person, place, and time.      Coordination: Romberg Test normal.      Gait: Gait is intact.      Deep Tendon Reflexes: Strength normal.   Psychiatric:         Mood and Affect: Mood normal.         Speech: Speech normal.         Behavior: Behavior normal.         Neurologic Exam     Mental Status   Oriented to person, place, and time.   Attention: normal. Concentration: normal.   Speech: speech is normal   Level of consciousness: alert    Cranial Nerves     CN II   Visual fields full to confrontation.   Visual acuity: normal    CN III, IV, VI   Pupils are equal, round, and reactive to light.  Extraocular motions are normal.   Right pupil: Shape: regular. Reactivity: brisk.   Left pupil: Shape: regular. Reactivity: brisk.   Diplopia: none  Ophthalmoparesis: none  Upgaze: normal  Downgaze: normal    CN V   Facial sensation intact.     CN VII   Facial expression full, symmetric.     CN VIII   CN VIII normal.     CN IX, X   CN IX normal.   CN X normal.     CN XI   CN XI normal.     CN XII   CN XII normal.     Motor Exam   Muscle bulk: normal  Overall muscle tone: normal  Right arm pronator drift: absent  Left arm pronator drift: absent    Strength   Strength " "5/5 throughout.     Sensory Exam   Light touch normal.     Gait, Coordination, and Reflexes     Gait  Gait: normal    Coordination   Romberg: negative    Tremor   Resting tremor: absent    Reflexes   Reflexes 2+ except as noted.        Results Review:   I have reviewed the patient's other medical records to include, labs, radiology and referrals.   I reviewed the patient's other test results and agree with the interpretation    Assessment / Plan      Assessment/Plan:   Diagnoses and all orders for this visit:    1. Sinus headache (Primary)  -     cyproheptadine (PERIACTIN) 4 MG tablet; Take 1 tablet by mouth Every Evening.  Dispense: 90 tablet; Refill: 1    2. Intractable migraine without aura and with status migrainosus  -     SUMAtriptan (Imitrex) 50 MG tablet; Take 1 tablet by mouth 1 (One) Time As Needed for Migraine. May repeat dose one time in 2 hours if headache not relieved. DNE 2 pills in 24 hours.  Dispense: 9 tablet; Refill: 2  -     US Carotid Bilateral; Future    3. Dizziness  -     US Carotid Bilateral; Future    4. Cigarette nicotine dependence with nicotine-induced disorder    Patient has been having persistent frontal headaches, most pronounced over her nose and retro-orbital.  She reports that she was told by ENT that this is \"transform migraine\".  Headache seems less consistent with migraine, although she does endorse occasional migrainous features of photophobia and phonophobia.  No previous daily preventative for headache, we discussed that Periactin can be helpful for both allergy and headache symptoms, she will begin this.  I have also prescribed sumatriptan to see if this helps relieve severe headache episodes.  If no relief with sumatriptan, migraine less likely diagnosis.  She is an active daily smoker, she does endorse some dizziness, I have ordered carotid ultrasound to assess for stenosis.  We discussed that smoking is detrimental to all aspects of her health, and I have strongly advised " her to quit.  She is not willing to consider tobacco cessation at this time.    Follow Up:   Return in about 2 months (around 12/6/2022) for Next scheduled follow up.     CORIN Huggins  [facility]   AS THE PROVIDER, I PERSONALLY WORE PPE DURING ENTIRE FACE TO FACE ENCOUNTER IN CLINIC WITH THE PATIENT. PATIENT ALSO WORE PPE DURING ENTIRE FACE TO FACE ENCOUNTER EXCEPT FOR A MAX OF 30 SECONDS DURING NEUROLOGICAL EVALUATION OF CRANIAL NERVES AND THEN MASK WAS PLACED BACK OVER PATIENT FACE FOR REMAINDER OF VISIT. I WASHED MY HANDS BEFORE AND AFTER VISIT.    Please note that portions of this note may have been completed with a voice recognition program. Efforts were made to edit the dictations, but occasionally words are mistranscribed.

## 2022-10-07 ENCOUNTER — TELEPHONE (OUTPATIENT)
Dept: FAMILY MEDICINE CLINIC | Facility: CLINIC | Age: 57
End: 2022-10-07

## 2022-10-07 RX ORDER — MIRTAZAPINE 15 MG/1
15 TABLET, ORALLY DISINTEGRATING ORAL NIGHTLY
Status: CANCELLED | OUTPATIENT
Start: 2022-10-07

## 2022-10-07 RX ORDER — ALPRAZOLAM 1 MG/1
1 TABLET ORAL 3 TIMES DAILY
Qty: 90 TABLET | Refills: 5 | Status: CANCELLED | OUTPATIENT
Start: 2022-10-07

## 2022-10-07 RX ORDER — OXYCODONE AND ACETAMINOPHEN 10; 325 MG/1; MG/1
1 TABLET ORAL 4 TIMES DAILY
Qty: 120 TABLET | Refills: 0 | Status: CANCELLED | OUTPATIENT
Start: 2022-10-07

## 2022-10-07 NOTE — TELEPHONE ENCOUNTER
PATIENT HAS CALLED REQUESTING CALL BACK ASAP FROM NURSE IN REGARDS TO MEDICATIONS    CALL BACK NUMBER [phone]

## 2022-10-07 NOTE — TELEPHONE ENCOUNTER
Caller: Yuliana Reno    Relationship: Self    Best call back number:[phone]     What is the best time to reach you: ANY TIME    Who are you requesting to speak with (clinical staff, provider,  specific staff member): CLINICAL    Do you know the name of the person who called: YULIANA RENO    What was the call regarding: PATIENT CALLED BACK INQUIRING ABOUT MEDICATION. REQUESTED TO SPEAK WITH SOMEONE IN OFFICE BUT COULD NOT CONNECT TO ANYONE.    Do you require a callback: YES

## 2022-10-07 NOTE — TELEPHONE ENCOUNTER
Pt needing refills of:  Mirtazapine  Percocet  Xanax  Please send to [facility]. Pt is out. Office visit yesterday and were not sent.

## 2022-10-09 DIAGNOSIS — M25.562 CHRONIC PAIN OF LEFT KNEE: ICD-10-CM

## 2022-10-09 DIAGNOSIS — M54.50 CHRONIC LOW BACK PAIN WITHOUT SCIATICA, UNSPECIFIED BACK PAIN LATERALITY: ICD-10-CM

## 2022-10-09 DIAGNOSIS — G89.29 CHRONIC LOW BACK PAIN WITHOUT SCIATICA, UNSPECIFIED BACK PAIN LATERALITY: ICD-10-CM

## 2022-10-09 DIAGNOSIS — G89.29 CHRONIC PAIN OF LEFT KNEE: ICD-10-CM

## 2022-10-09 DIAGNOSIS — F41.9 ANXIETY: ICD-10-CM

## 2022-10-09 RX ORDER — ALPRAZOLAM 1 MG/1
1 TABLET ORAL 3 TIMES DAILY
Qty: 90 TABLET | Refills: 5 | Status: CANCELLED | OUTPATIENT
Start: 2022-10-09

## 2022-10-09 RX ORDER — ALPRAZOLAM 1 MG/1
1 TABLET ORAL 3 TIMES DAILY
Qty: 90 TABLET | Refills: 5 | Status: SHIPPED | OUTPATIENT
Start: 2022-10-09 | End: 2023-04-04 | Stop reason: SDUPTHER

## 2022-10-09 RX ORDER — OXYCODONE AND ACETAMINOPHEN 10; 325 MG/1; MG/1
1 TABLET ORAL 4 TIMES DAILY
Qty: 120 TABLET | Refills: 0 | Status: SHIPPED | OUTPATIENT
Start: 2022-10-09 | End: 2022-11-21 | Stop reason: SDUPTHER

## 2022-10-09 RX ORDER — MIRTAZAPINE 15 MG/1
15 TABLET, FILM COATED ORAL NIGHTLY
Qty: 30 TABLET | Refills: 5 | Status: SHIPPED | OUTPATIENT
Start: 2022-10-09 | End: 2022-12-06 | Stop reason: SDUPTHER

## 2022-10-09 RX ORDER — OXYCODONE AND ACETAMINOPHEN 10; 325 MG/1; MG/1
1 TABLET ORAL 4 TIMES DAILY
Qty: 120 TABLET | Refills: 0 | Status: CANCELLED | OUTPATIENT
Start: 2022-10-09

## 2022-10-09 RX ORDER — MIRTAZAPINE 15 MG/1
15 TABLET, ORALLY DISINTEGRATING ORAL NIGHTLY
Status: CANCELLED | OUTPATIENT
Start: 2022-10-09

## 2022-10-13 DIAGNOSIS — Z12.31 BREAST CANCER SCREENING BY MAMMOGRAM: Primary | ICD-10-CM

## 2022-10-21 LAB
DRUGS UR: NORMAL
Lab: NORMAL

## 2022-10-24 NOTE — PROGRESS NOTES
Both mirtazapine which is Remeron and Zoloft have been prescribed for her.  The Remeron is current and the Zoloft was marked off her list as discontinued

## 2022-11-08 ENCOUNTER — TELEPHONE (OUTPATIENT)
Dept: FAMILY MEDICINE CLINIC | Facility: CLINIC | Age: 57
End: 2022-11-08

## 2022-11-08 RX ORDER — AMOXICILLIN AND CLAVULANATE POTASSIUM 875; 125 MG/1; MG/1
1 TABLET, FILM COATED ORAL 2 TIMES DAILY
Qty: 20 TABLET | Refills: 0 | Status: SHIPPED | OUTPATIENT
Start: 2022-11-08 | End: 2022-11-18

## 2022-11-21 ENCOUNTER — TELEMEDICINE (OUTPATIENT)
Dept: FAMILY MEDICINE CLINIC | Facility: CLINIC | Age: 57
End: 2022-11-21

## 2022-11-21 DIAGNOSIS — G89.29 CHRONIC PAIN OF BOTH KNEES: Primary | ICD-10-CM

## 2022-11-21 DIAGNOSIS — M25.562 CHRONIC PAIN OF LEFT KNEE: ICD-10-CM

## 2022-11-21 DIAGNOSIS — M25.561 CHRONIC PAIN OF BOTH KNEES: Primary | ICD-10-CM

## 2022-11-21 DIAGNOSIS — G89.29 CHRONIC PAIN OF LEFT KNEE: ICD-10-CM

## 2022-11-21 DIAGNOSIS — G89.29 CHRONIC LOW BACK PAIN WITHOUT SCIATICA, UNSPECIFIED BACK PAIN LATERALITY: ICD-10-CM

## 2022-11-21 DIAGNOSIS — M25.562 CHRONIC PAIN OF BOTH KNEES: Primary | ICD-10-CM

## 2022-11-21 DIAGNOSIS — M54.50 CHRONIC LOW BACK PAIN WITHOUT SCIATICA, UNSPECIFIED BACK PAIN LATERALITY: ICD-10-CM

## 2022-11-21 PROCEDURE — 99213 OFFICE O/P EST LOW 20 MIN: CPT | Performed by: PHYSICIAN ASSISTANT

## 2022-11-21 RX ORDER — OXYCODONE AND ACETAMINOPHEN 10; 325 MG/1; MG/1
1 TABLET ORAL 4 TIMES DAILY
Qty: 40 TABLET | Refills: 0 | Status: SHIPPED | OUTPATIENT
Start: 2022-11-21 | End: 2022-12-06 | Stop reason: SDUPTHER

## 2022-11-21 NOTE — PROGRESS NOTES
Subjective   Yuliana Kennedy is a 57 y.o. female  Med Refill      History of Present Illness  Patient presents and consents for telehealth/video visit examination.  Patient presents today for virtual visit from her home while I am at my office.  She is following up on chronic pain management with daily opioid therapy for chronic knee pain associated with degenerative joint disease of her knees.  Pain is currently stable on current pain medication regimen has been stable on this regimen for a number of years.  She has severe pain limiting her ADLs without opioid therapy, is able to carry out ADLs more effectively and keep pain to a manageable level on current medication.  Denies any adverse effects on current medication.  The following portions of the patient's history were reviewed and updated as appropriate: allergies, current medications, past social history and problem list    Review of Systems   Constitutional: Negative for activity change.   Musculoskeletal: Positive for arthralgias, gait problem and myalgias. Negative for joint swelling.   Skin: Negative.    Neurological: Negative for weakness and numbness.   Psychiatric/Behavioral: Negative.        Objective     There were no vitals filed for this visit.    Physical Exam  Constitutional:       General: She is not in acute distress.     Appearance: Normal appearance. She is well-developed. She is not ill-appearing, toxic-appearing or diaphoretic.   HENT:      Head: Normocephalic and atraumatic.   Eyes:      Conjunctiva/sclera: Conjunctivae normal.   Pulmonary:      Effort: Pulmonary effort is normal. No respiratory distress.   Skin:     General: Skin is dry.      Coloration: Skin is not pale.      Findings: No erythema or rash.   Neurological:      Mental Status: She is alert and oriented to person, place, and time.      Coordination: Coordination normal.   Psychiatric:         Attention and Perception: She is attentive.         Mood and Affect: Mood normal.          Speech: Speech normal.         Behavior: Behavior normal.         Thought Content: Thought content normal.         Judgment: Judgment normal.         Assessment & Plan     Diagnoses and all orders for this visit:    1. Chronic pain of both knees (Primary)    Patient was in the office in person last month updated her controlled substance agreement and urine drug screen which was appropriate.  We will send in a 10-day supply of current dosage of oxycodone 4 times daily #40 until patient is able to schedule an appointment with her PCP for further pain management.    As part of this patient's treatment plan, patient will be prescribed controlled substances. The patient has been made aware of appropriate use of such medications, including potential risk of somnolence, limited ability to drive and /or work safely, and potential for dependence or overdose. It has also been made clear that these medications are for use by this patient only, without concomitant use of alcohol or other substances unless prescribed.Controlled substance status of medication discussed with patient, discussed risks of medication including abuse potential and diversion potential and need to follow up for reevaluation appointment in order to receive further refills.    Part of this note may be an electronic transcription/translation of spoken language to printed text using the Dragon Dictation System.      I spent 15 minutes in patient care: Reviewing records prior to the visit, examining the patient, entering orders and documentation    Part of this note may be an electronic transcription/translation of spoken language to printed text using the Dragon Dictation System.

## 2022-12-06 ENCOUNTER — TELEMEDICINE (OUTPATIENT)
Dept: FAMILY MEDICINE CLINIC | Facility: CLINIC | Age: 57
End: 2022-12-06
Payer: COMMERCIAL

## 2022-12-06 DIAGNOSIS — M54.50 CHRONIC LOW BACK PAIN WITHOUT SCIATICA, UNSPECIFIED BACK PAIN LATERALITY: ICD-10-CM

## 2022-12-06 DIAGNOSIS — G89.29 CHRONIC LOW BACK PAIN WITHOUT SCIATICA, UNSPECIFIED BACK PAIN LATERALITY: ICD-10-CM

## 2022-12-06 DIAGNOSIS — M25.562 CHRONIC PAIN OF LEFT KNEE: ICD-10-CM

## 2022-12-06 DIAGNOSIS — G89.29 CHRONIC PAIN OF LEFT KNEE: ICD-10-CM

## 2022-12-06 DIAGNOSIS — R51.9 SINUS HEADACHE: ICD-10-CM

## 2022-12-06 PROCEDURE — 99213 OFFICE O/P EST LOW 20 MIN: CPT | Performed by: FAMILY MEDICINE

## 2022-12-06 PROCEDURE — 1126F AMNT PAIN NOTED NONE PRSNT: CPT | Performed by: FAMILY MEDICINE

## 2022-12-06 RX ORDER — OXYCODONE AND ACETAMINOPHEN 10; 325 MG/1; MG/1
1 TABLET ORAL EVERY 6 HOURS PRN
Qty: 105 TABLET | Refills: 0 | Status: SHIPPED | OUTPATIENT
Start: 2022-12-06 | End: 2023-01-10 | Stop reason: SDUPTHER

## 2022-12-06 RX ORDER — CYPROHEPTADINE HYDROCHLORIDE 4 MG/1
4 TABLET ORAL EVERY EVENING
Qty: 90 TABLET | Refills: 3 | Status: SHIPPED | OUTPATIENT
Start: 2022-12-06 | End: 2023-01-10 | Stop reason: SDUPTHER

## 2022-12-06 RX ORDER — MIRTAZAPINE 15 MG/1
15 TABLET, FILM COATED ORAL NIGHTLY
Qty: 90 TABLET | Refills: 3 | Status: SHIPPED | OUTPATIENT
Start: 2022-12-06

## 2022-12-28 ENCOUNTER — TELEPHONE (OUTPATIENT)
Dept: FAMILY MEDICINE CLINIC | Facility: CLINIC | Age: 57
End: 2022-12-28

## 2022-12-28 RX ORDER — BROMPHENIRAMINE MALEATE, PSEUDOEPHEDRINE HYDROCHLORIDE, AND DEXTROMETHORPHAN HYDROBROMIDE 2; 30; 10 MG/5ML; MG/5ML; MG/5ML
5 SYRUP ORAL 4 TIMES DAILY PRN
Qty: 180 ML | Refills: 0 | Status: SHIPPED | OUTPATIENT
Start: 2022-12-28

## 2022-12-28 RX ORDER — AZITHROMYCIN 250 MG/1
TABLET, FILM COATED ORAL
Qty: 6 TABLET | Refills: 0 | Status: SHIPPED | OUTPATIENT
Start: 2022-12-28

## 2023-01-02 NOTE — PROGRESS NOTES
Subjective   Yuliana Kennedy is a 57 y.o. female    Chief Complaint    Chronic back pain   Chronic knee pain   Sinus congestion   Insomnia    History of Present Illness  Patient with chronic pain issues including back and knees.  Also complaining of chronic sinus congestion and longstanding insomnia.  She is due for refills on her medications prompting the telemedicine visit.    The following portions of the patient's history were reviewed and updated as appropriate: allergies, current medications, past social history and problem list    Review of Systems   Constitutional: Negative.  Negative for fatigue and unexpected weight change.   Respiratory: Negative.     Gastrointestinal: Negative.    Musculoskeletal:  Positive for back pain. Negative for arthralgias, gait problem and myalgias.   Neurological:  Negative for dizziness, tremors, weakness, light-headedness, numbness and headaches.   Psychiatric/Behavioral:  Positive for sleep disturbance. Negative for agitation, behavioral problems, confusion, decreased concentration, dysphoric mood and hallucinations. The patient is not nervous/anxious and is not hyperactive.      Objective     There were no vitals filed for this visit.    Physical Exam  Vitals and nursing note reviewed.   Constitutional:       General: She is not in acute distress.     Appearance: Normal appearance. She is well-developed. She is not ill-appearing, toxic-appearing or diaphoretic.   HENT:      Head: Normocephalic and atraumatic.   Eyes:      General: No scleral icterus.     Conjunctiva/sclera: Conjunctivae normal.      Pupils: Pupils are equal, round, and reactive to light.   Cardiovascular:      Rate and Rhythm: Normal rate and regular rhythm.   Pulmonary:      Effort: Pulmonary effort is normal.      Breath sounds: Normal breath sounds.   Abdominal:      General: Bowel sounds are normal.      Palpations: Abdomen is soft.   Musculoskeletal:      Lumbar back: Tenderness and bony tenderness  present. No swelling, deformity or spasms. Decreased range of motion.   Neurological:      Mental Status: She is alert and oriented to person, place, and time.      Coordination: Coordination normal.      Deep Tendon Reflexes: Reflexes are normal and symmetric.   Psychiatric:         Attention and Perception: She is attentive.         Mood and Affect: Mood normal.         Behavior: Behavior normal.         Thought Content: Thought content normal.         Judgment: Judgment normal.     Assessment & Plan   Problems Addressed this Visit    None  Visit Diagnoses       Sinus headache        Relevant Medications    cyproheptadine (PERIACTIN) 4 MG tablet    Chronic pain of left knee        Relevant Medications    oxyCODONE-acetaminophen (PERCOCET)  MG per tablet    Chronic low back pain without sciatica, unspecified back pain laterality        Relevant Medications    oxyCODONE-acetaminophen (PERCOCET)  MG per tablet          Diagnoses         Codes Comments    Sinus headache     ICD-10-CM: R51.9  ICD-9-CM: 784.0     Chronic pain of left knee     ICD-10-CM: M25.562, G89.29  ICD-9-CM: 719.46, 338.29     Chronic low back pain without sciatica, unspecified back pain laterality     ICD-10-CM: M54.50, G89.29  ICD-9-CM: 724.2, 338.29           As part of this patient's treatment plan, patient will be prescribed controlled substances. The patient has been made aware of appropriate use of such medications, including potential risk of somnolence, limited ability to drive and /or work safely, and potential for dependence or overdose. It has also been made clear that these medications are for use by this patient only, without concomitant use of alcohol or other substances unless prescribed.Controlled substance status of medication discussed with patient, discussed risks of medication including abuse potential and diversion potential and need to follow up for reevaluation appointment in order to receive further  refills.    Part of this note may be an electronic transcription/translation of spoken language to printed text using the Dragon Dictation System.

## 2023-01-10 ENCOUNTER — TELEMEDICINE (OUTPATIENT)
Dept: FAMILY MEDICINE CLINIC | Facility: CLINIC | Age: 58
End: 2023-01-10
Payer: COMMERCIAL

## 2023-01-10 DIAGNOSIS — M54.50 CHRONIC LOW BACK PAIN WITHOUT SCIATICA, UNSPECIFIED BACK PAIN LATERALITY: ICD-10-CM

## 2023-01-10 DIAGNOSIS — G89.29 CHRONIC LOW BACK PAIN WITHOUT SCIATICA, UNSPECIFIED BACK PAIN LATERALITY: ICD-10-CM

## 2023-01-10 DIAGNOSIS — R51.9 SINUS HEADACHE: ICD-10-CM

## 2023-01-10 DIAGNOSIS — M25.562 CHRONIC PAIN OF LEFT KNEE: Primary | ICD-10-CM

## 2023-01-10 DIAGNOSIS — F41.9 ANXIETY: ICD-10-CM

## 2023-01-10 DIAGNOSIS — G43.011 INTRACTABLE MIGRAINE WITHOUT AURA AND WITH STATUS MIGRAINOSUS: ICD-10-CM

## 2023-01-10 DIAGNOSIS — G89.29 CHRONIC PAIN OF LEFT KNEE: Primary | ICD-10-CM

## 2023-01-10 PROCEDURE — 99213 OFFICE O/P EST LOW 20 MIN: CPT | Performed by: FAMILY MEDICINE

## 2023-01-10 RX ORDER — CYPROHEPTADINE HYDROCHLORIDE 4 MG/1
4 TABLET ORAL EVERY EVENING
Qty: 90 TABLET | Refills: 3 | Status: SHIPPED | OUTPATIENT
Start: 2023-01-10

## 2023-01-10 RX ORDER — SUMATRIPTAN 50 MG/1
50 TABLET, FILM COATED ORAL ONCE AS NEEDED
Qty: 9 TABLET | Refills: 5 | Status: SHIPPED | OUTPATIENT
Start: 2023-01-10

## 2023-01-10 RX ORDER — OXYCODONE AND ACETAMINOPHEN 10; 325 MG/1; MG/1
1 TABLET ORAL EVERY 6 HOURS PRN
Qty: 105 TABLET | Refills: 0 | Status: SHIPPED | OUTPATIENT
Start: 2023-01-10 | End: 2023-02-07 | Stop reason: SDUPTHER

## 2023-01-11 NOTE — PROGRESS NOTES
Subjective   Yuliana Kennedy is a 57 y.o. female   Patient requests and consents to telemedicine visit using audiovisual aid.  She is in her home and I am in my office    Chief Complaint    Chronic back pain  Chronic left knee pain  Anxiety  Sinus pain  Migraine headache    History of Present Illness  Patient presents today for telemedicine visit request requesting refills for her opioid analgesic and her new headache medications.  She is also requesting a prior authorization for her alprazolam.  These are similar requests to her previous visit.  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  She is quite optimistic about this opportunity.    We discussed continued tapering off of her opioid analgesics.  She is asking to continue at her current level for another month before decreasing.  We had a discussion regarding this at her previous visit and she agrees to wean off the medication.  I agree with a slow weaning process for her.    The following portions of the patient's history were reviewed and updated as appropriate: allergies, current medications, past social history and problem list    Review of Systems   Constitutional: Negative.  Negative for appetite change, chills, fatigue, fever and unexpected weight change.   HENT: Positive for ear pain, rhinorrhea, sinus pressure and sinus pain. Negative for congestion, dental problem, postnasal drip and sore throat.    Eyes: Negative for photophobia, pain and visual disturbance.   Respiratory: Negative.  Negative for chest tightness and shortness of breath.    Gastrointestinal: Negative.  Negative for abdominal pain, diarrhea, nausea and vomiting.   Musculoskeletal: Positive for arthralgias, back pain, gait problem and myalgias.   Neurological: Positive for headaches. Negative for dizziness, tremors, syncope, facial asymmetry, speech  difficulty, weakness, light-headedness and numbness.   Hematological: Negative for adenopathy.   Psychiatric/Behavioral: Negative for agitation, behavioral problems, confusion, decreased concentration, dysphoric mood, sleep disturbance and suicidal ideas. The patient is nervous/anxious.        Objective     There were no vitals filed for this visit.    Physical Exam  Constitutional:       Appearance: Normal appearance.   HENT:      Head: Normocephalic and atraumatic.   Pulmonary:      Effort: Pulmonary effort is normal. No respiratory distress.   Neurological:      Mental Status: She is alert and oriented to person, place, and time.   Psychiatric:         Mood and Affect: Mood normal.         Behavior: Behavior normal.         Assessment & Plan   Problems Addressed this Visit    None  Visit Diagnoses     Chronic pain of left knee    -  Primary    Relevant Medications    oxyCODONE-acetaminophen (PERCOCET)  MG per tablet    Chronic low back pain without sciatica, unspecified back pain laterality        Relevant Medications    oxyCODONE-acetaminophen (PERCOCET)  MG per tablet    Sinus headache        Relevant Medications    cyproheptadine (PERIACTIN) 4 MG tablet    Intractable migraine without aura and with status migrainosus        Relevant Medications    SUMAtriptan (Imitrex) 50 MG tablet    oxyCODONE-acetaminophen (PERCOCET)  MG per tablet    Anxiety          Diagnoses       Codes Comments    Chronic pain of left knee    -  Primary ICD-10-CM: M25.562, G89.29  ICD-9-CM: 719.46, 338.29     Chronic low back pain without sciatica, unspecified back pain laterality     ICD-10-CM: M54.50, G89.29  ICD-9-CM: 724.2, 338.29     Sinus headache     ICD-10-CM: R51.9  ICD-9-CM: 784.0     Intractable migraine without aura and with status migrainosus     ICD-10-CM: G43.011  ICD-9-CM: 346.13     Anxiety     ICD-10-CM: F41.9  ICD-9-CM: 300.00         As part of this patient's treatment plan, patient will be  prescribed controlled substances. The patient has been made aware of appropriate use of such medications, including potential risk of somnolence, limited ability to drive and /or work safely, and potential for dependence or overdose. It has also been made clear that these medications are for use by this patient only, without concomitant use of alcohol or other substances unless prescribed.Controlled substance status of medication discussed with patient, discussed risks of medication including abuse potential and diversion potential and need to follow up for reevaluation appointment in order to receive further refills.    Part of this note may be an electronic transcription/translation of spoken language to printed text using the Dragon Dictation System.

## 2023-01-25 ENCOUNTER — TELEPHONE (OUTPATIENT)
Dept: FAMILY MEDICINE CLINIC | Facility: CLINIC | Age: 58
End: 2023-01-25

## 2023-01-25 NOTE — TELEPHONE ENCOUNTER
Pt calling to get a refill of her migraine medication that she takes daily as she has lost the medication. Advised pt that the only migraine medication on her active med list is the sumatriptan that she takes as needed, but said she had these. Pt is calling the pharmacy to check with them to see which medication it was and will call back.

## 2023-03-02 ENCOUNTER — TELEMEDICINE (OUTPATIENT)
Dept: FAMILY MEDICINE CLINIC | Facility: CLINIC | Age: 58
End: 2023-03-02
Payer: COMMERCIAL

## 2023-03-02 DIAGNOSIS — M54.50 CHRONIC LOW BACK PAIN WITHOUT SCIATICA, UNSPECIFIED BACK PAIN LATERALITY: ICD-10-CM

## 2023-03-02 DIAGNOSIS — M25.562 CHRONIC PAIN OF LEFT KNEE: Primary | ICD-10-CM

## 2023-03-02 DIAGNOSIS — F41.9 ANXIETY: ICD-10-CM

## 2023-03-02 DIAGNOSIS — G89.29 CHRONIC LOW BACK PAIN WITHOUT SCIATICA, UNSPECIFIED BACK PAIN LATERALITY: ICD-10-CM

## 2023-03-02 DIAGNOSIS — G89.29 CHRONIC PAIN OF LEFT KNEE: Primary | ICD-10-CM

## 2023-03-02 PROCEDURE — 1159F MED LIST DOCD IN RCRD: CPT | Performed by: FAMILY MEDICINE

## 2023-03-02 PROCEDURE — 99213 OFFICE O/P EST LOW 20 MIN: CPT | Performed by: FAMILY MEDICINE

## 2023-03-02 PROCEDURE — 1160F RVW MEDS BY RX/DR IN RCRD: CPT | Performed by: FAMILY MEDICINE

## 2023-03-02 RX ORDER — OXYCODONE AND ACETAMINOPHEN 10; 325 MG/1; MG/1
1 TABLET ORAL EVERY 6 HOURS PRN
Qty: 90 TABLET | Refills: 0 | Status: SHIPPED | OUTPATIENT
Start: 2023-03-02 | End: 2023-03-30 | Stop reason: SDUPTHER

## 2023-03-30 ENCOUNTER — TELEMEDICINE (OUTPATIENT)
Dept: FAMILY MEDICINE CLINIC | Facility: CLINIC | Age: 58
End: 2023-03-30
Payer: COMMERCIAL

## 2023-03-30 DIAGNOSIS — J30.9 ALLERGIC RHINITIS, UNSPECIFIED SEASONALITY, UNSPECIFIED TRIGGER: ICD-10-CM

## 2023-03-30 DIAGNOSIS — M54.50 CHRONIC LOW BACK PAIN WITHOUT SCIATICA, UNSPECIFIED BACK PAIN LATERALITY: ICD-10-CM

## 2023-03-30 DIAGNOSIS — G89.29 CHRONIC PAIN OF LEFT KNEE: Primary | ICD-10-CM

## 2023-03-30 DIAGNOSIS — M25.562 CHRONIC PAIN OF LEFT KNEE: Primary | ICD-10-CM

## 2023-03-30 DIAGNOSIS — G89.29 CHRONIC LOW BACK PAIN WITHOUT SCIATICA, UNSPECIFIED BACK PAIN LATERALITY: ICD-10-CM

## 2023-03-30 PROCEDURE — 99213 OFFICE O/P EST LOW 20 MIN: CPT | Performed by: FAMILY MEDICINE

## 2023-03-30 PROCEDURE — 1159F MED LIST DOCD IN RCRD: CPT | Performed by: FAMILY MEDICINE

## 2023-03-30 PROCEDURE — 1160F RVW MEDS BY RX/DR IN RCRD: CPT | Performed by: FAMILY MEDICINE

## 2023-03-30 RX ORDER — OXYCODONE AND ACETAMINOPHEN 10; 325 MG/1; MG/1
1 TABLET ORAL EVERY 6 HOURS PRN
Qty: 90 TABLET | Refills: 0 | Status: SHIPPED | OUTPATIENT
Start: 2023-03-30

## 2023-03-30 RX ORDER — FEXOFENADINE HCL 180 MG/1
TABLET ORAL
Qty: 30 TABLET | Refills: 5 | Status: SHIPPED | OUTPATIENT
Start: 2023-03-30

## 2023-03-30 NOTE — PROGRESS NOTES
Subjective   Yuliana Kennedy is a 57 y.o. female   Patient requests and consents to telemedicine visit using audiovisual aid.  She is in her home and I am in my office.    Chief Complaint    Chronic left knee pain  Chronic low back pain  Allergic rhinitis    History of Present Illness  Patient presents today for telemedicine visit due for refills of her opioid analgesics for her chronic, posttraumatic left knee pain and chronic low back pain which she feels has been getting worse.  We are weaning her opioid pain medication gradually.  She is down to 90 pills/month.    Complaining of increasing allergic rhinitis symptoms despite being on medications already.  She is asking about what else she could take.  This is obviously a very allergy problem time of year and she does a great deal of work out in her yard.    The following portions of the patient's history were reviewed and updated as appropriate: allergies, current medications, past social history and problem list    Review of Systems   Constitutional: Negative.  Negative for chills, fatigue and fever.   HENT: Positive for congestion, postnasal drip, rhinorrhea, sneezing and sore throat. Negative for ear pain, hearing loss, sinus pressure and trouble swallowing.    Eyes: Positive for itching.   Respiratory: Negative.  Negative for cough.    Cardiovascular: Negative for chest pain.   Gastrointestinal: Negative.    Musculoskeletal: Positive for arthralgias, back pain, gait problem and myalgias.   Neurological: Negative for dizziness, tremors, weakness, numbness and headaches.   Psychiatric/Behavioral: Negative for behavioral problems and dysphoric mood. The patient is not nervous/anxious.        Objective     There were no vitals filed for this visit.    Physical Exam  Constitutional:       Appearance: Normal appearance.   HENT:      Head: Normocephalic and atraumatic.   Pulmonary:      Effort: Pulmonary effort is normal. No respiratory distress.   Neurological:       Mental Status: She is alert and oriented to person, place, and time.   Psychiatric:         Mood and Affect: Mood normal.         Behavior: Behavior normal.         Assessment & Plan   Problems Addressed this Visit        Allergies and Adverse Reactions    Allergic rhinitis    Relevant Medications    fexofenadine (ALLEGRA) 180 MG tablet   Other Visit Diagnoses     Chronic pain of left knee    -  Primary    Relevant Medications    oxyCODONE-acetaminophen (PERCOCET)  MG per tablet    Chronic low back pain without sciatica, unspecified back pain laterality        Relevant Medications    oxyCODONE-acetaminophen (PERCOCET)  MG per tablet      Diagnoses       Codes Comments    Chronic pain of left knee    -  Primary ICD-10-CM: M25.562, G89.29  ICD-9-CM: 719.46, 338.29     Chronic low back pain without sciatica, unspecified back pain laterality     ICD-10-CM: M54.50, G89.29  ICD-9-CM: 724.2, 338.29     Allergic rhinitis, unspecified seasonality, unspecified trigger     ICD-10-CM: J30.9  ICD-9-CM: 477.9         As part of this patient's treatment plan, patient will be prescribed controlled substances. The patient has been made aware of appropriate use of such medications, including potential risk of somnolence, limited ability to drive and /or work safely, and potential for dependence or overdose. It has also been made clear that these medications are for use by this patient only, without concomitant use of alcohol or other substances unless prescribed.Controlled substance status of medication discussed with patient, discussed risks of medication including abuse potential and diversion potential and need to follow up for reevaluation appointment in order to receive further refills.    Part of this note may be an electronic transcription/translation of spoken language to printed text using the Dragon Dictation System.

## 2023-04-04 DIAGNOSIS — F41.9 ANXIETY: ICD-10-CM

## 2023-04-04 NOTE — TELEPHONE ENCOUNTER
Caller: Yuliana Kennedy SHELBIE    Relationship: Self    Best call back number: [phone]    Requested Prescriptions:   Requested Prescriptions     Pending Prescriptions Disp Refills   • ALPRAZolam (XANAX) 1 MG tablet 90 tablet 5     Sig: Take 1 tablet by mouth 3 (Three) Times a Day.        Pharmacy where request should be sent: [id number] - [place] - [address] [facility] [phone] [place] [phone]      Last office visit with prescribing clinician: 10/6/2022   Last telemedicine visit with prescribing clinician: Visit date not found   Next office visit with prescribing clinician: Visit date not found         Does the patient have less than a 3 day supply:  [x] Yes  [] No    Would you like a call back once the refill request has been completed: [] Yes [x] No    If the office needs to give you a call back, can they leave a voicemail: [] Yes [x] No    Nicolas Arteaga Rep   04/04/23 09:20 EDT

## 2023-04-05 RX ORDER — ALPRAZOLAM 1 MG/1
1 TABLET ORAL 3 TIMES DAILY
Qty: 90 TABLET | Refills: 5 | Status: SHIPPED | OUTPATIENT
Start: 2023-04-05

## 2023-04-05 NOTE — TELEPHONE ENCOUNTER
PATIENT CALLED BACK IN AND IS COMPLETELY OUT OF THE MEDICATION AND ONLY HAD ENOUGH FOR 1 YESTERDAY. REQUESTING MEDICATION SENT TODAY PLEASE. PATIENT IS REQUESTING A CALLBACK ONCE THAT HAS BEEN DONE

## 2023-04-07 NOTE — PROGRESS NOTES
Subjective   Yuliana Kennedy is a 57 y.o. female patient requesting consents to telemedicine visit using audiovisual aid.  She is in her home and I am in my office for the duration of the visit.    Chief Complaint    Chronic left knee pain  Chronic low back pain  Anxiety disorder    History of Present Illness  Patient presents today for periodic follow-up regarding the above complaints.  She is on chronic pain medication.  Her primary source of pain is her left knee which is a result of a severe trauma where she fell off a porch at her home suffering severe fracture of the left knee.  Surgical procedures were done but she is left with deformity and chronic discomfort.  Her pain medicines allow her to carry out her activities of daily living with limited discomfort.  She has no new or acute complaints today.    The following portions of the patient's history were reviewed and updated as appropriate: allergies, current medications, past social history and problem list    Review of Systems   Constitutional: Negative.  Negative for appetite change and fatigue.   Respiratory: Negative.  Negative for chest tightness and shortness of breath.    Gastrointestinal: Negative.  Negative for abdominal pain, diarrhea and nausea.   Musculoskeletal: Positive for back pain. Negative for arthralgias, gait problem and myalgias.   Neurological: Negative for dizziness, tremors, weakness, light-headedness, numbness and headaches.   Psychiatric/Behavioral: Positive for sleep disturbance. Negative for agitation, behavioral problems, confusion, decreased concentration, dysphoric mood and suicidal ideas. The patient is not nervous/anxious.        Objective     There were no vitals filed for this visit.    Physical Exam  Constitutional:       Appearance: Normal appearance.   HENT:      Head: Normocephalic and atraumatic.   Pulmonary:      Effort: Pulmonary effort is normal. No respiratory distress.   Neurological:      Mental Status: She is  alert and oriented to person, place, and time.   Psychiatric:         Mood and Affect: Mood normal.         Behavior: Behavior normal.         Assessment & Plan   Problems Addressed this Visit    None  Visit Diagnoses     Chronic pain of left knee    -  Primary    Chronic low back pain without sciatica, unspecified back pain laterality        Anxiety          Diagnoses       Codes Comments    Chronic pain of left knee    -  Primary ICD-10-CM: M25.562, G89.29  ICD-9-CM: 719.46, 338.29     Chronic low back pain without sciatica, unspecified back pain laterality     ICD-10-CM: M54.50, G89.29  ICD-9-CM: 724.2, 338.29     Anxiety     ICD-10-CM: F41.9  ICD-9-CM: 300.00         Prescription for oxycodone/acetaminophen 10/325 mg/tab., dispense 90 tablets, 1 tablet every 6 hours as needed for pain.  Prescription E scrib[facility] to [facility].    As part of this patient's treatment plan, patient will be prescribed controlled substances. The patient has been made aware of appropriate use of such medications, including potential risk of somnolence, limited ability to drive and /or work safely, and potential for dependence or overdose. It has also been made clear that these medications are for use by this patient only, without concomitant use of alcohol or other substances unless prescribed.Controlled substance status of medication discussed with patient, discussed risks of medication including abuse potential and diversion potential and need to follow up for reevaluation appointment in order to receive further refills.    Part of this note may be an electronic transcription/translation of spoken language to printed text using the Dragon Dictation System.

## 2023-04-18 ENCOUNTER — TELEPHONE (OUTPATIENT)
Dept: FAMILY MEDICINE CLINIC | Facility: CLINIC | Age: 58
End: 2023-04-18

## 2023-04-18 RX ORDER — METHYLPREDNISOLONE 4 MG/1
TABLET ORAL
Qty: 1 EACH | Refills: 0 | Status: SHIPPED | OUTPATIENT
Start: 2023-04-18

## 2023-04-18 NOTE — TELEPHONE ENCOUNTER
Caller: Yuliana Kennedy    Relationship: Self    Best call back number: [phone]    What medication are you requesting: Z-PACK    What are your current symptoms: RED DOTS ALL OVER ITCHING VERY BAD    Have you had these symptoms before:    [x] Yes  [] No    Have you been treated for these symptoms before:   [x] Yes  [] No    If a prescription is needed, what is your preferred pharmacy and phone number:    [facility] [phone]  Additional notes:  PATIENT GETS THIS EVERY YEAR AROUND THIS TIME AND USUALLY GETS A MEDICATION SHE BELIEVES IS A Z-PACK WHICH TITRATE DOWN. PLEASE ADVISE

## 2023-05-02 ENCOUNTER — TELEMEDICINE (OUTPATIENT)
Dept: FAMILY MEDICINE CLINIC | Facility: CLINIC | Age: 58
End: 2023-05-02
Payer: COMMERCIAL

## 2023-05-02 DIAGNOSIS — M54.50 CHRONIC LOW BACK PAIN WITHOUT SCIATICA, UNSPECIFIED BACK PAIN LATERALITY: ICD-10-CM

## 2023-05-02 DIAGNOSIS — G89.29 CHRONIC LOW BACK PAIN WITHOUT SCIATICA, UNSPECIFIED BACK PAIN LATERALITY: ICD-10-CM

## 2023-05-02 DIAGNOSIS — M25.562 CHRONIC PAIN OF LEFT KNEE: ICD-10-CM

## 2023-05-02 DIAGNOSIS — G89.29 CHRONIC PAIN OF LEFT KNEE: ICD-10-CM

## 2023-05-02 PROCEDURE — 1159F MED LIST DOCD IN RCRD: CPT | Performed by: FAMILY MEDICINE

## 2023-05-02 PROCEDURE — 1160F RVW MEDS BY RX/DR IN RCRD: CPT | Performed by: FAMILY MEDICINE

## 2023-05-02 PROCEDURE — 99213 OFFICE O/P EST LOW 20 MIN: CPT | Performed by: FAMILY MEDICINE

## 2023-05-02 RX ORDER — OXYCODONE AND ACETAMINOPHEN 10; 325 MG/1; MG/1
1 TABLET ORAL EVERY 6 HOURS PRN
Qty: 90 TABLET | Refills: 0 | Status: SHIPPED | OUTPATIENT
Start: 2023-05-02

## 2023-05-02 NOTE — PROGRESS NOTES
Subjective   Yuliana Kennedy is a 57 y.o. female   Patient requesting consents to telemedicine visit using audiovisual aid.  She is in her home and I am in my office.  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  She is due for refills on her analgesic.  She has cut down to 90 tablets/month with a goal of 60 tablets/month.  She reports that she is tolerating being weaned off the analgesics without problems.  She reports that she will be symptomatic the first day or 2 after a decrease has been instituted.    She has no new or acute complaints today.  She is somewhat melancholy over the poor prognosis of her boyfriend.  His PSA is gone from [id number] despite treatment.    The following portions of the patient's history were reviewed and updated as appropriate: allergies, current medications, past social history and problem list    Review of Systems   Constitutional: Negative.    Respiratory: Positive for cough. Negative for shortness of breath and wheezing.    Cardiovascular: Negative for chest pain.   Gastrointestinal: Negative.    Musculoskeletal: Positive for arthralgias, back pain, gait problem and myalgias.   Neurological: Negative for dizziness, tremors, weakness and numbness.   Psychiatric/Behavioral: Positive for dysphoric mood. Negative for behavioral problems. The patient is not nervous/anxious.        Objective     There were no vitals filed for this visit.    Physical Exam  Constitutional:       Appearance: Normal appearance.   HENT:      Head: Normocephalic and atraumatic.   Pulmonary:      Effort: Pulmonary effort is normal. No respiratory distress.   Neurological:      Mental Status: She is alert and oriented to person, place, and  time.   Psychiatric:         Mood and Affect: Mood normal.         Behavior: Behavior normal.         Assessment & Plan   Problems Addressed this Visit    None  Visit Diagnoses     Chronic pain of left knee        Relevant Medications    oxyCODONE-acetaminophen (PERCOCET)  MG per tablet    Chronic low back pain without sciatica, unspecified back pain laterality        Relevant Medications    oxyCODONE-acetaminophen (PERCOCET)  MG per tablet      Diagnoses       Codes Comments    Chronic pain of left knee     ICD-10-CM: M25.562, G89.29  ICD-9-CM: 719.46, 338.29     Chronic low back pain without sciatica, unspecified back pain laterality     ICD-10-CM: M54.50, G89.29  ICD-9-CM: 724.2, 338.29         As part of this patient's treatment plan, patient will be prescribed controlled substances. The patient has been made aware of appropriate use of such medications, including potential risk of somnolence, limited ability to drive and /or work safely, and potential for dependence or overdose. It has also been made clear that these medications are for use by this patient only, without concomitant use of alcohol or other substances unless prescribed.Controlled substance status of medication discussed with patient, discussed risks of medication including abuse potential and diversion potential and need to follow up for reevaluation appointment in order to receive further refills.    Part of this note may be an electronic transcription/translation of spoken language to printed text using the Dragon Dictation System.

## 2023-05-26 DIAGNOSIS — K21.00 GASTROESOPHAGEAL REFLUX DISEASE WITH ESOPHAGITIS: ICD-10-CM

## 2023-05-30 RX ORDER — OMEPRAZOLE 40 MG/1
CAPSULE, DELAYED RELEASE ORAL
Qty: 30 CAPSULE | Refills: 11 | Status: SHIPPED | OUTPATIENT
Start: 2023-05-30

## 2023-06-05 ENCOUNTER — OFFICE VISIT (OUTPATIENT)
Dept: FAMILY MEDICINE CLINIC | Facility: CLINIC | Age: 58
End: 2023-06-05
Payer: COMMERCIAL

## 2023-06-05 VITALS
HEART RATE: 69 BPM | TEMPERATURE: 96.5 F | RESPIRATION RATE: 15 BRPM | HEIGHT: 66 IN | BODY MASS INDEX: 26.2 KG/M2 | SYSTOLIC BLOOD PRESSURE: 134 MMHG | DIASTOLIC BLOOD PRESSURE: 84 MMHG | OXYGEN SATURATION: 97 % | WEIGHT: 163 LBS

## 2023-06-05 DIAGNOSIS — G89.29 CHRONIC LOW BACK PAIN WITHOUT SCIATICA, UNSPECIFIED BACK PAIN LATERALITY: ICD-10-CM

## 2023-06-05 DIAGNOSIS — G89.29 CHRONIC PAIN OF LEFT KNEE: Primary | ICD-10-CM

## 2023-06-05 DIAGNOSIS — Z51.81 ENCOUNTER FOR THERAPEUTIC DRUG MONITORING: ICD-10-CM

## 2023-06-05 DIAGNOSIS — M25.562 CHRONIC PAIN OF LEFT KNEE: Primary | ICD-10-CM

## 2023-06-05 DIAGNOSIS — M54.50 CHRONIC LOW BACK PAIN WITHOUT SCIATICA, UNSPECIFIED BACK PAIN LATERALITY: ICD-10-CM

## 2023-06-05 RX ORDER — OXYCODONE AND ACETAMINOPHEN 10; 325 MG/1; MG/1
1 TABLET ORAL EVERY 6 HOURS PRN
Qty: 75 TABLET | Refills: 0 | Status: SHIPPED | OUTPATIENT
Start: 2023-06-05

## 2023-06-06 NOTE — PROGRESS NOTES
"Subjective   Yuliana Kennedy is a 57 y.o. female    Chief Complaint    Chronic left knee pain    History of Present Illness  The patient presents today for an in-office follow-up related to chronic pain issues. She is currently on oxycodone 10 mg/325 mg 3 times daily as needed for pain. The patient receives 90 tablets every 30 days. She has been followed in orthopedics at the [facility] and has had previous surgical intervention following a trauma where she had fractured her left tibia and fibula as well as a tibial plateau fracture related to a fall from her porch several years ago. The patient is left with some deformity and gait disturbance from this injury. She also suffers from numerous allergies and recurring allergy-related sinusitis.    The patient has a severe headache, and she is fatigued constantly. The patient feels like she can lay down and go to sleep at any time. Sometimes the headaches will last 1 day, and sometimes 2 to 3 days. She claims that she has to sleep them off. The patient takes ibuprofen. She feels that her headaches may be due to seasonal allergies.    The patient's balance is not as good as it used to be because of her left knee. She feels like \"her brain does not tell her to pick her left leg up.\" Her right lower extremity is fine, but if she forgets to pick her left upper extremity up, she drags it a little bit. She thinks she might have \"drop foot syndrome\" from when she fractured her left lower extremity. The patient admits that she falls frequently.    The following portions of the patient's history were reviewed and updated as appropriate: allergies, current medications, past social history and problem list    Review of Systems   Constitutional:  Negative for fatigue and unexpected weight change.   HENT:  Negative for congestion, dental problem, postnasal drip, sinus pressure and sore throat.    Eyes:  Negative for photophobia, pain and visual disturbance.   Respiratory: " Negative.     Gastrointestinal: Negative.  Negative for nausea and vomiting.   Musculoskeletal:  Positive for back pain. Negative for arthralgias, gait problem and myalgias.   Neurological:  Positive for weakness and headaches. Negative for dizziness, tremors, syncope, facial asymmetry, speech difficulty, light-headedness and numbness.   Psychiatric/Behavioral:  Negative for agitation, behavioral problems, confusion, dysphoric mood and sleep disturbance. The patient is not nervous/anxious.      Objective     Vitals:    06/05/23 1542   BP: 134/84   Pulse: 69   Resp: 15   Temp: 96.5 °F (35.8 °C)   SpO2: 97%       Physical Exam  Vitals and nursing note reviewed.   Constitutional:       General: She is not in acute distress.     Appearance: Normal appearance. She is well-developed. She is not ill-appearing, toxic-appearing or diaphoretic.   HENT:      Head: Normocephalic and atraumatic.   Eyes:      Conjunctiva/sclera: Conjunctivae normal.      Pupils: Pupils are equal, round, and reactive to light.   Cardiovascular:      Rate and Rhythm: Normal rate and regular rhythm.   Pulmonary:      Effort: Pulmonary effort is normal.      Breath sounds: Normal breath sounds.   Abdominal:      General: Bowel sounds are normal.      Palpations: Abdomen is soft.   Musculoskeletal:      Cervical back: Normal range of motion and neck supple. No rigidity.      Lumbar back: Tenderness and bony tenderness present. No swelling, deformity or spasms. Decreased range of motion.      Right lower leg: No edema.      Left lower leg: No edema.   Neurological:      Mental Status: She is alert and oriented to person, place, and time.      Cranial Nerves: No cranial nerve deficit.      Sensory: No sensory deficit.      Motor: No weakness.      Coordination: Coordination normal.      Deep Tendon Reflexes: Reflexes are normal and symmetric. Reflexes normal.   Psychiatric:         Mood and Affect: Mood normal.         Speech: Speech normal.          Behavior: Behavior normal.         Thought Content: Thought content normal.         Judgment: Judgment normal.       Assessment & Plan   Problems Addressed this Visit    None  Visit Diagnoses       Chronic pain of left knee    -  Primary    Relevant Medications    oxyCODONE-acetaminophen (PERCOCET)  MG per tablet    Chronic low back pain without sciatica, unspecified back pain laterality        Relevant Medications    oxyCODONE-acetaminophen (PERCOCET)  MG per tablet    Encounter for therapeutic drug monitoring        Relevant Orders    Compliance Drug Analysis, Ur - Urine, Clean Catch          Diagnoses         Codes Comments    Chronic pain of left knee    -  Primary ICD-10-CM: M25.562, G89.29  ICD-9-CM: 719.46, 338.29     Chronic low back pain without sciatica, unspecified back pain laterality     ICD-10-CM: M54.50, G89.29  ICD-9-CM: 724.2, 338.29     Encounter for therapeutic drug monitoring     ICD-10-CM: Z51.81  ICD-9-CM: V58.83           I spent 20 minutes in patient care: Reviewing records prior to the visit, examining the patient, entering orders and documentation    Part of this note may be an electronic transcription/translation of spoken language to printed text using the Dragon Dictation System.          Transcribed from ambient dictation for KHRIS Crawford MD by Mayra Handy.  06/05/23   20:13 EDT    Patient or patient representative verbalized consent to the visit recording.  I have personally performed the services described in this document as transcribed by the above individual, and it is both accurate and complete.

## 2023-06-12 LAB — DRUGS UR: NORMAL

## (undated) DEVICE — TUBING, SUCTION, 1/4" X 20', STRAIGHT: Brand: MEDLINE INDUSTRIES, INC.

## (undated) DEVICE — Device: Brand: ENDOGATOR

## (undated) DEVICE — DEV INFL ALLIANCE2 SYS

## (undated) DEVICE — CONN Y IRR DISP 1P/U

## (undated) DEVICE — Device: Brand: DEFENDO AIR/WATER/SUCTION AND BIOPSY VALVE

## (undated) DEVICE — FRCP BX RADJAW4 NDL 2.8 240CM LG OG BX40

## (undated) DEVICE — THE BITE BLOCK MAXI, LATEX FREE STRAP IS USED TO PROTECT THE ENDOSCOPE INSERTION TUBE FROM BEING BITTEN BY THE PATIENT.

## (undated) DEVICE — SYR LUERLOK 30CC

## (undated) DEVICE — Device

## (undated) DEVICE — SINGLE PORT MANIFOLD: Brand: NEPTUNE 2